# Patient Record
Sex: FEMALE | Race: WHITE | NOT HISPANIC OR LATINO | Employment: OTHER | URBAN - METROPOLITAN AREA
[De-identification: names, ages, dates, MRNs, and addresses within clinical notes are randomized per-mention and may not be internally consistent; named-entity substitution may affect disease eponyms.]

---

## 2017-03-15 ENCOUNTER — TELEPHONE (OUTPATIENT)
Dept: FAMILY MEDICINE | Facility: CLINIC | Age: 62
End: 2017-03-15

## 2017-03-15 NOTE — TELEPHONE ENCOUNTER
Patient states she has been taking Citalopram for anxiety. Patient has been irritable, lethargic, overwhelmed by family situations . Conditions have worsened lately and would like to discuss medication changes. Appt scheduled for 3/16/17, patient verbalized understanding.

## 2017-03-15 NOTE — TELEPHONE ENCOUNTER
----- Message from Sandra Sanderson sent at 3/15/2017 10:53 AM CDT -----  Patient requesting appointment this week with doctor to discuss medications/stated feels medication is not helping/please call back at [phone] to schedule or advise.

## 2017-03-16 ENCOUNTER — OFFICE VISIT (OUTPATIENT)
Dept: FAMILY MEDICINE | Facility: CLINIC | Age: 62
End: 2017-03-16
Payer: COMMERCIAL

## 2017-03-16 VITALS
SYSTOLIC BLOOD PRESSURE: 124 MMHG | TEMPERATURE: 99 F | DIASTOLIC BLOOD PRESSURE: 70 MMHG | BODY MASS INDEX: 26.13 KG/M2 | RESPIRATION RATE: 16 BRPM | HEART RATE: 71 BPM | WEIGHT: 147.5 LBS | HEIGHT: 63 IN

## 2017-03-16 DIAGNOSIS — F33.1 MAJOR DEPRESSIVE DISORDER, RECURRENT, MODERATE: Primary | ICD-10-CM

## 2017-03-16 PROCEDURE — 99214 OFFICE O/P EST MOD 30 MIN: CPT | Mod: S$GLB,,, | Performed by: INTERNAL MEDICINE

## 2017-03-16 PROCEDURE — 3078F DIAST BP <80 MM HG: CPT | Mod: S$GLB,,, | Performed by: INTERNAL MEDICINE

## 2017-03-16 PROCEDURE — 1160F RVW MEDS BY RX/DR IN RCRD: CPT | Mod: S$GLB,,, | Performed by: INTERNAL MEDICINE

## 2017-03-16 PROCEDURE — 3074F SYST BP LT 130 MM HG: CPT | Mod: S$GLB,,, | Performed by: INTERNAL MEDICINE

## 2017-03-16 RX ORDER — VENLAFAXINE HYDROCHLORIDE 75 MG/1
75 CAPSULE, EXTENDED RELEASE ORAL DAILY
Qty: 30 CAPSULE | Refills: 11 | Status: SHIPPED | OUTPATIENT
Start: 2017-03-16 | End: 2017-04-26

## 2017-03-16 NOTE — MR AVS SNAPSHOT
[facility] ([place], [place])  [address] [address] [address]  [facility] [address]  Phone: [phone]  Fax: [phone]                  Keena Banks   3/16/2017 11:00 AM   Office Visit    Description:  Female : 1955   Provider:  Marisela Lockwood DO   Department:  [facility] ([place], [place])           Reason for Visit     Follow-up           Diagnoses this Visit        Comments    Major depressive disorder, recurrent, moderate    -  Primary            To Do List           Future Appointments        Provider Department Dept Phone    2017 11:20 AM Marisela Lockwood DO [facility] ([place], [place]) [phone]    2017 10:00 AM Marisela Lockwood DO [facility] ([place], [place]) [phone]    2017 1:00 PM LAB, [place] [facility] [phone]    2017 1:20 PM Aryan Luciano NP [facility] - Hematology [phone]      Goals (5 Years of Data)              Today    16    Blood Pressure < 140/90   124/70  124/70  124/70       These Medications        Disp Refills Start End    venlafaxine (EFFEXOR-XR) 75 MG 24 hr capsule 30 capsule 11 3/16/2017 3/16/2018    Take 1 capsule (75 mg total) by mouth once daily. - Oral    Pharmacy: [facility]/pharmacy #[id number] - [place] - [address] Ph #: [phone]         [facility]     [facility] -  Assistance  Registered nurses in the [facility] Center provide clinical advisement, health education, appointment booking, and other advisory services.  Call for this free service at [phone].             Medications           Message regarding Medications     Verify the changes and/or additions to your medication regime listed below are the same as discussed with your clinician today.  If any of these changes or additions are incorrect, please notify your healthcare provider.        START taking these NEW medications        Refills    venlafaxine (EFFEXOR-XR) 75 MG 24 hr capsule  "11    Sig: Take 1 capsule (75 mg total) by mouth once daily.    Class: Normal    Route: Oral           Verify that the below list of medications is an accurate representation of the medications you are currently taking.  If none reported, the list may be blank. If incorrect, please contact your healthcare provider. Carry this list with you in case of emergency.           Current Medications     aspirin (ECOTRIN) 81 MG EC tablet Take 1 tablet (81 mg total) by mouth once daily.    citalopram (CELEXA) 40 MG tablet TAKE 1 TABLET (40 MG TOTAL) BY MOUTH ONCE DAILY.    ESTRACE 0.01 % (0.1 mg/gram) vaginal cream PLACE 2 GRAMS VAGINALLY EVERY 7 DAYS    flaxseed oil 1,030 mg Cap Take 1 capsule by mouth once daily. Every morning    folic acid (FOLVITE) 1 MG tablet Take 1 mg by mouth once daily.      hydrochlorothiazide (HYDRODIURIL) 25 MG tablet TAKE 1 TABLET BY MOUTH EVERY DAY    ibuprofen (ADVIL,MOTRIN) 200 MG tablet Take 400 mg by mouth every 6 (six) hours as needed for Pain.    lamotrigine (LAMICTAL) 300 mg XR tablet Take 1 tablet (300 mg total) by mouth once daily.    multivitamin with minerals tablet Take 1 tablet by mouth once daily.      rosuvastatin (CRESTOR) 20 MG tablet Take 1 tablet (20 mg total) by mouth once daily.    venlafaxine (EFFEXOR-XR) 75 MG 24 hr capsule Take 1 capsule (75 mg total) by mouth once daily.           Clinical Reference Information           Your Vitals Were     BP Pulse Temp Resp Height Weight    124/70 71 98.7 °F (37.1 °C) (Oral) 16 5' 3" (1.6 m) 66.9 kg (147 lb 7.8 oz)    BMI                26.13 kg/m2          Blood Pressure          Most Recent Value    BP  124/70      Allergies as of 3/16/2017     Adhesive    Sulfa (Sulfonamide Antibiotics)      Immunizations Administered on Date of Encounter - 3/16/2017     None      Language Assistance Services     ATTENTION: Language assistance services are available, free of charge. Please call [phone].      ATENCIÓN: ros Johansen " wallace disposición servicios gratuitos de asistencia lingüística. Llgale al [phone].     MELINA Ý: N?u b?n nói Ti?ng Vi?t, có các d?ch v? h? tr? ngôn ng? mi?n phí dành cho b?n. G?i s? [phone].         [facility] complies with applicable Federal civil rights laws and does not discriminate on the basis of race, color, national origin, age, disability, or sex.

## 2017-03-16 NOTE — PROGRESS NOTES
Subjective:       Patient ID: Keena Banks is a 61 y.o. female.    Medication List with Changes/Refills   New Medications    VENLAFAXINE (EFFEXOR-XR) 75 MG 24 HR CAPSULE    Take 1 capsule (75 mg total) by mouth once daily.   Current Medications    ASPIRIN (ECOTRIN) 81 MG EC TABLET    Take 1 tablet (81 mg total) by mouth once daily.    ESTRACE 0.01 % (0.1 MG/GRAM) VAGINAL CREAM    PLACE 2 GRAMS VAGINALLY EVERY 7 DAYS    FLAXSEED OIL 1,030 MG CAP    Take 1 capsule by mouth once daily. Every morning    FOLIC ACID (FOLVITE) 1 MG TABLET    Take 1 mg by mouth once daily.      HYDROCHLOROTHIAZIDE (HYDRODIURIL) 25 MG TABLET    TAKE 1 TABLET BY MOUTH EVERY DAY    IBUPROFEN (ADVIL,MOTRIN) 200 MG TABLET    Take 400 mg by mouth every 6 (six) hours as needed for Pain.    LAMOTRIGINE (LAMICTAL) 300 MG XR TABLET    Take 1 tablet (300 mg total) by mouth once daily.    MULTIVITAMIN WITH MINERALS TABLET    Take 1 tablet by mouth once daily.      ROSUVASTATIN (CRESTOR) 20 MG TABLET    Take 1 tablet (20 mg total) by mouth once daily.   Discontinued Medications    CITALOPRAM (CELEXA) 40 MG TABLET    TAKE 1 TABLET (40 MG TOTAL) BY MOUTH ONCE DAILY.       Chief Complaint: Follow-up (wants to change Citalopram)  She has depression and has been on celexa for over 10 years.  She reports over the last couple months worsening depression. She is overwhelmed and get easily upset.  A lot of this has to due with raising her granddaughter who has some issues.  She is withdrawing and does not like to do the things she use to enjoy.  No anxiety but some mood swings.  No suicidal ideations. She is sleeping well and over eating.  She has no energy and is more irritable. She would like to try another antidepressant.      Review of Systems   Constitutional: Positive for fatigue. Negative for activity change, appetite change, chills, fever and unexpected weight change.   HENT: Negative for congestion, ear discharge, ear pain, hearing loss, mouth sores,  "postnasal drip, rhinorrhea, sinus pressure, sore throat and trouble swallowing.    Eyes: Negative for pain, discharge, redness and visual disturbance.   Respiratory: Negative for cough, chest tightness, shortness of breath and wheezing.    Cardiovascular: Negative for chest pain, palpitations and leg swelling.   Gastrointestinal: Negative for abdominal pain, blood in stool, constipation, diarrhea, nausea and vomiting.   Endocrine: Negative for polyuria.   Genitourinary: Negative for dysuria and hematuria.   Musculoskeletal: Negative for arthralgias, back pain, myalgias and neck stiffness.   Skin: Negative for rash.   Neurological: Negative for dizziness, weakness, numbness and headaches.   Hematological: Negative for adenopathy. Does not bruise/bleed easily.   Psychiatric/Behavioral: Positive for dysphoric mood. Negative for sleep disturbance and suicidal ideas. The patient is not nervous/anxious.        Objective:      Vitals:    03/16/17 1100   BP: 124/70   Pulse: 71   Resp: 16   Temp: 98.7 °F (37.1 °C)   TempSrc: Oral   Weight: 66.9 kg (147 lb 7.8 oz)   Height: 5' 3" (1.6 m)     Body mass index is 26.13 kg/(m^2).  Physical Exam    General appearance: No acute distress, cooperative  Neck: FROM, soft, supple, no thyromegaly, no bruits  Lymph: no anterior or posterior cervical adenopathy  Heart::  Regular rate and rhythm, no murmur  Lung: Clear to ascultation bilaterally, no wheezing, no rales, no rhonchi, no distress  Abdomen: Soft, nontender, no distention, no hepatosplenomegaly, bowel sounds normal, no guarding, no rebound, no peritoneal signs  Skin: no rashes, no lesions  Extremities: no edema, no cyanosis  Peripheral pulses: 2+ pedal pulses bilaterally, good perfusion and colo    Assessment:       1. Major depressive disorder, recurrent, moderate        Plan:       Major depressive disorder, recurrent, moderate  Uncontrolled and will try to switch her to an SNRI.  She will take celexa 1/2 pill for one week then " stop. She will start venlafaxine once a day.  She will f/u with me in 3-4 weeks to recheck.  She will call is she is having any issues.    -     venlafaxine (EFFEXOR-XR) 75 MG 24 hr capsule; Take 1 capsule (75 mg total) by mouth once daily.  Dispense: 30 capsule; Refill: 11    Return in about 4 weeks (around 4/13/2017) for recheck depression.      Greater than 25 minutes was spent in direct face to face time with patient.  Greater than 50% of the time spent was in counseling and coordination of care for depression.

## 2017-04-05 ENCOUNTER — DOCUMENTATION ONLY (OUTPATIENT)
Dept: ADMINISTRATIVE | Facility: HOSPITAL | Age: 62
End: 2017-04-05

## 2017-04-05 NOTE — PROGRESS NOTES
PRE-VISIT CHART REVIEW    Appointment Scheduled on 4/19/17    Department stratifications & guidelines reviewed:yes    Target Chronic Diagnosis: HTN    Chronic Diagnosis Intervention Due: no    Goals Updated:N/A    There are no preventive care reminders to display for this patient.    Advanced Directives:   65 years of age or older?  No  Directive on file?  N\A                                      Pre-visit patient communication:  In Person    Studies or screenings scheduled pre-visit: no

## 2017-04-26 ENCOUNTER — OFFICE VISIT (OUTPATIENT)
Dept: FAMILY MEDICINE | Facility: CLINIC | Age: 62
End: 2017-04-26
Payer: COMMERCIAL

## 2017-04-26 VITALS
SYSTOLIC BLOOD PRESSURE: 128 MMHG | DIASTOLIC BLOOD PRESSURE: 80 MMHG | BODY MASS INDEX: 25.78 KG/M2 | TEMPERATURE: 98 F | WEIGHT: 145.5 LBS | HEIGHT: 63 IN | RESPIRATION RATE: 18 BRPM | HEART RATE: 70 BPM

## 2017-04-26 DIAGNOSIS — F33.0 MAJOR DEPRESSIVE DISORDER, RECURRENT, MILD: Primary | ICD-10-CM

## 2017-04-26 PROCEDURE — 1160F RVW MEDS BY RX/DR IN RCRD: CPT | Mod: S$GLB,,, | Performed by: INTERNAL MEDICINE

## 2017-04-26 PROCEDURE — 99213 OFFICE O/P EST LOW 20 MIN: CPT | Mod: S$GLB,,, | Performed by: INTERNAL MEDICINE

## 2017-04-26 PROCEDURE — 3074F SYST BP LT 130 MM HG: CPT | Mod: S$GLB,,, | Performed by: INTERNAL MEDICINE

## 2017-04-26 PROCEDURE — 3079F DIAST BP 80-89 MM HG: CPT | Mod: S$GLB,,, | Performed by: INTERNAL MEDICINE

## 2017-04-26 RX ORDER — VENLAFAXINE HYDROCHLORIDE 150 MG/1
150 CAPSULE, EXTENDED RELEASE ORAL DAILY
Qty: 90 CAPSULE | Refills: 2 | Status: SHIPPED | OUTPATIENT
Start: 2017-04-26 | End: 2018-01-12 | Stop reason: SDUPTHER

## 2017-04-26 NOTE — PROGRESS NOTES
Subjective:       Patient ID: Keena Banks is a 62 y.o. female.    Current Outpatient Prescriptions   Medication Sig Dispense Refill    aspirin (ECOTRIN) 81 MG EC tablet Take 1 tablet (81 mg total) by mouth once daily.  0    ESTRACE 0.01 % (0.1 mg/gram) vaginal cream PLACE 2 GRAMS VAGINALLY EVERY 7 DAYS 42.5 g 1    flaxseed oil 1,030 mg Cap Take 1 capsule by mouth once daily. Every morning      folic acid (FOLVITE) 1 MG tablet Take 1 mg by mouth once daily.        hydrochlorothiazide (HYDRODIURIL) 25 MG tablet TAKE 1 TABLET BY MOUTH EVERY DAY 90 tablet 3    ibuprofen (ADVIL,MOTRIN) 200 MG tablet Take 400 mg by mouth every 6 (six) hours as needed for Pain.      lamotrigine (LAMICTAL) 300 mg XR tablet Take 1 tablet (300 mg total) by mouth once daily. 90 tablet 3    multivitamin with minerals tablet Take 1 tablet by mouth once daily.        rosuvastatin (CRESTOR) 20 MG tablet Take 1 tablet (20 mg total) by mouth once daily. 90 tablet 3    venlafaxine (EFFEXOR-XR) 150 MG Cp24 Take 1 capsule (150 mg total) by mouth once daily. 90 capsule 2     No current facility-administered medications for this visit.      Chief Complaint: Follow-up (4 week follow up for medicaiton change )  she is here today to f/u on her depression. She was seen on 3/16/17 for depression that was uncontrolled. She had been taking celexa for years and was decided to change to venlafaxine. She says she made the transition well with only one week of lightheadedness and headaches.  This has resolved. She says this medication is working but she is still weepy and low energy. She feels she needs a higher dose.  No suicidal ideations. She is sleeping okay and eating okay.  No side effects.      Review of Systems   Constitutional: Negative for activity change, appetite change, chills, fatigue and fever.   HENT: Negative for congestion, ear discharge, ear pain, mouth sores, postnasal drip, rhinorrhea, sinus pressure and sore throat.    Eyes: Negative  "for pain, discharge and redness.   Respiratory: Negative for cough, chest tightness, shortness of breath and wheezing.    Gastrointestinal: Negative for abdominal pain, constipation, diarrhea, nausea and vomiting.   Genitourinary: Negative for dysuria.   Musculoskeletal: Negative for arthralgias and neck stiffness.   Skin: Negative for rash.   Neurological: Negative for headaches.   Hematological: Negative for adenopathy.   Psychiatric/Behavioral: Positive for dysphoric mood.       Objective:      Vitals:    04/26/17 0841 04/26/17 0851   BP: (!) 156/86 128/80   Pulse: 70    Resp: 18    Temp: 98.1 °F (36.7 °C)    Weight: 66 kg (145 lb 8.1 oz)    Height: 5' 3" (1.6 m)      Body mass index is 25.77 kg/(m^2).  Physical Exam    General appearance: alert, no acute distress  Neck: supple, FROM, no masses, no tenderness  Lymph: no posterior or cervical adenopathy  Lungs: no distress, no retractions, clear to ascultation bilaterally, no wheezing, no rales, no rhonchi  Heart:: Regular rate and rhythm, no murmur  Abdomen: soft, non-tender, no guarding, no rebound, no peritoneal signs, bowel sounds normal, no hepatosplenomegaly, no masses  Skin: no rashes or lesion  Perfusion: good capillary refill, normal pulses      Assessment:       1. Major depressive disorder, recurrent, mild        Plan:       Major depressive disorder, recurrent, mild  Improved on effexor but still with symptoms .  Will increase the dose today and she will f/u by email or call if having issues.    -     venlafaxine (EFFEXOR-XR) 150 MG Cp24; Take 1 capsule (150 mg total) by mouth once daily.  Dispense: 90 capsule; Refill: 2    Return for already scheduled.      "

## 2017-06-13 ENCOUNTER — OFFICE VISIT (OUTPATIENT)
Dept: FAMILY MEDICINE | Facility: CLINIC | Age: 62
End: 2017-06-13
Payer: COMMERCIAL

## 2017-06-13 VITALS
SYSTOLIC BLOOD PRESSURE: 124 MMHG | HEIGHT: 63 IN | TEMPERATURE: 99 F | HEART RATE: 73 BPM | WEIGHT: 147.69 LBS | DIASTOLIC BLOOD PRESSURE: 86 MMHG | RESPIRATION RATE: 18 BRPM | BODY MASS INDEX: 26.17 KG/M2 | OXYGEN SATURATION: 95 %

## 2017-06-13 DIAGNOSIS — Z85.72 HISTORY OF LYMPHOMA: ICD-10-CM

## 2017-06-13 DIAGNOSIS — I10 ESSENTIAL HYPERTENSION: ICD-10-CM

## 2017-06-13 DIAGNOSIS — G40.909 SEIZURE DISORDER: ICD-10-CM

## 2017-06-13 DIAGNOSIS — K76.0 NAFLD (NONALCOHOLIC FATTY LIVER DISEASE): ICD-10-CM

## 2017-06-13 DIAGNOSIS — E78.5 HYPERLIPIDEMIA, UNSPECIFIED HYPERLIPIDEMIA TYPE: ICD-10-CM

## 2017-06-13 DIAGNOSIS — F33.0 MAJOR DEPRESSIVE DISORDER, RECURRENT, MILD: Primary | ICD-10-CM

## 2017-06-13 PROCEDURE — 99214 OFFICE O/P EST MOD 30 MIN: CPT | Mod: S$GLB,,, | Performed by: INTERNAL MEDICINE

## 2017-06-13 NOTE — MEDICAL/APP STUDENT
Subjective:       Patient ID: Keena Banks is a 62 y.o. female.    Chief Complaint: Follow-up, Medication Management    Pt is a 63yo F with PMHx of depression, HTN, HTLD, seizure disorder here for follow up.    Pt reports she has been feeling irritable with her new dose of Effexor. She finds it hard to get up in the morning despite sleeping well. When she does get up, she feels fine. She has had a stressful week with the death of a relative so that might have contributed. She denies feeling sad, trouble sleeping, or decreased appetite.    Pt reports not checking her blood pressure lately since having a busy week. Denies Has, blurry vision, abdominal pain    Pt reports no seizures to date. Has no trouble with her medications and will be following up with her neurologist in August.      Review of Systems   Constitutional: Negative for chills, fatigue and fever.   HENT: Negative for ear pain, postnasal drip, rhinorrhea, sinus pressure, sneezing and sore throat.    Eyes: Negative for pain and visual disturbance.   Respiratory: Negative for cough, chest tightness and shortness of breath.    Cardiovascular: Negative for chest pain and leg swelling.   Gastrointestinal: Negative for abdominal pain, constipation, diarrhea, nausea and vomiting.   Genitourinary: Negative for dysuria, frequency, hematuria and urgency.   Musculoskeletal: Negative for arthralgias and back pain.   Neurological: Negative for dizziness, seizures, numbness and headaches.   Psychiatric/Behavioral: Positive for agitation.       Objective:      Physical Exam   Constitutional: She is oriented to person, place, and time. She appears well-developed and well-nourished.   HENT:   Head: Normocephalic and atraumatic.   Eyes: EOM are normal. Pupils are equal, round, and reactive to light.   Neck: Normal range of motion. Neck supple.   Cardiovascular: Normal rate, regular rhythm and normal heart sounds.    Pulmonary/Chest: Effort normal and breath sounds normal.    Abdominal: Soft. Bowel sounds are normal.   Neurological: She is alert and oriented to person, place, and time.       Assessment:       1. Major depressive disorder, recurrent, mild    2. Essential hypertension    3. Hyperlipidemia, unspecified hyperlipidemia type    4. NAFLD (nonalcoholic fatty liver disease)    5. History of lymphoma    6. Seizure disorder        Plan:       1. Major depressive disorder, recurrent, mild - will continue to try this dose of Effexor and look for changes in mood.  - continue current medication    2. Essential hypertension - well controlled  - continue current medication    3. Hyperlipidemia, unspecified hyperlipidemia type  - continue current medication    4. Seizure disorder - no new seizures to date, continue to follow up with neurology   - DXA Bone Density Spine And Hip; Future    Johnna Correia, MS4

## 2017-06-13 NOTE — PROGRESS NOTES
Subjective:       Patient ID: Keena Banks is a 62 y.o. female.    Current Outpatient Prescriptions   Medication Sig Dispense Refill    aspirin (ECOTRIN) 81 MG EC tablet Take 1 tablet (81 mg total) by mouth once daily.  0    ESTRACE 0.01 % (0.1 mg/gram) vaginal cream PLACE 2 GRAMS VAGINALLY EVERY 7 DAYS 42.5 g 1    flaxseed oil 1,030 mg Cap Take 1 capsule by mouth once daily. Every morning      folic acid (FOLVITE) 1 MG tablet Take 1 mg by mouth once daily.        hydrochlorothiazide (HYDRODIURIL) 25 MG tablet TAKE 1 TABLET BY MOUTH EVERY DAY 90 tablet 3    ibuprofen (ADVIL,MOTRIN) 200 MG tablet Take 400 mg by mouth every 6 (six) hours as needed for Pain.      lamotrigine (LAMICTAL) 300 mg XR tablet Take 1 tablet (300 mg total) by mouth once daily. 90 tablet 3    multivitamin with minerals tablet Take 1 tablet by mouth once daily.        rosuvastatin (CRESTOR) 20 MG tablet Take 1 tablet (20 mg total) by mouth once daily. 90 tablet 3    venlafaxine (EFFEXOR-XR) 150 MG Cp24 Take 1 capsule (150 mg total) by mouth once daily. 90 capsule 2     No current facility-administered medications for this visit.      Chief Complaint: Follow-up, Medication Management  She is here today to f/u on her chronic medical issues.      She has hypertension that is controlled with HCTZ 25 mg qday. She says her home BP reading are good. She has no known CAD. She denies chest pain or shortness of breath.      She has hyperlipidemia controlled on crestor 40 mg qday. Her last lipids were done on 12/2016 were 184/52/89/84.  She is taking aspirin daily.     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 She is taking lamictal. She is doing very well. She has not had a seizure for over 3 years. She does say the lamictal affects her memory but no other side effects. She follows with neurology annually and was last seen on 6/2016.   She is going to call for an appt.      She had  depression with anxiety that was recently uncontrolled.  Eight weeks ago she was transitioned to effexor and one month ago her dose was increased to 150 mg qday. She feels she is doing better. She says she still gets irritable but her depressive symptoms are improving.  No suicidal ideations. No sleep issues. No anxiety. She continues to raise her 10 year old granddaughter who has ADD and emotional issues.      She has a history of NHL s/p 8 cycles of chemotherapy with CHOP/rituxan. She was  seen by oncology for her 48 month checkup after chemo on 8/2016. She continues to be in remission and will f/u in one year.      She continues has now retired from [employer] and is very happy with this decision. . She does walk 3 times a week but does not exercise. She tries to eat healthy but has had 8 lbs weight gain.       She had an ultrasound showing fatty infiltration of her liver. She has normal LFTs.       Colonoscopy---8/2016 repeat in 8 years  Mammogram----12/2016 neg  DEXA-----never  Pap-----once since [facility] neg  Tdap---7/2008  Influenza vaccine---12/2016  Pneumovax 23----11/2015  Prevnar 13----12/2016  Shingles vaccine-----11/2015    Review of Systems   Constitutional: Negative for appetite change, fatigue, fever and unexpected weight change.   HENT: Negative for congestion, ear pain, hearing loss, sore throat and trouble swallowing.    Eyes: Negative for pain and visual disturbance.   Respiratory: Negative for cough, chest tightness, shortness of breath and wheezing.    Cardiovascular: Negative for chest pain, palpitations and leg swelling.   Gastrointestinal: Negative for abdominal pain, blood in stool, constipation, diarrhea, nausea and vomiting.   Endocrine: Negative for polyuria.   Genitourinary: Negative for dysuria and hematuria.   Musculoskeletal: Negative for arthralgias, back pain and myalgias.   Skin: Negative for rash.   Neurological: Negative for dizziness, weakness, numbness and headaches.   Hematological:  "Does not bruise/bleed easily.   Psychiatric/Behavioral: Negative for dysphoric mood, sleep disturbance and suicidal ideas. The patient is not nervous/anxious.        Objective:      Vitals:    06/13/17 0905   BP: 124/86   BP Location: Left arm   Patient Position: Sitting   BP Method: Manual   Pulse: 73   Resp: 18   Temp: 99 °F (37.2 °C)   TempSrc: Oral   SpO2: 95%   Weight: 67 kg (147 lb 11.3 oz)   Height: 5' 3" (1.6 m)     Body mass index is 26.17 kg/m².  Physical Exam    General appearance: No acute distress, cooperative  Neck: FROM, soft, supple, no thyromegaly, no bruits  Lymph: no anterior or posterior cervical adenopathy  Heart::  Regular rate and rhythm, no murmur  Lung: Clear to ascultation bilaterally, no wheezing, no rales, no rhonchi, no distress  Abdomen: Soft, nontender, no distention, no hepatosplenomegaly, bowel sounds normal, no guarding, no rebound, no peritoneal signs  Skin: no rashes, no lesions  Extremities: no edema, no cyanosis  Neuro: no focal abnormalities, strength 5/5 b/l UE and LE, 2+ DTRs b/l UE and LE, normal gait  Peripheral pulses: 2+ pedal pulses bilaterally, good perfusion and color  Musculoskeletal: FROM, good strenth, no tenderness  Joint: normal appearance, no swelling, no warmth, no deformity in all joints    Assessment:       1. Major depressive disorder, recurrent, mild    2. Essential hypertension    3. Hyperlipidemia, unspecified hyperlipidemia type    4. NAFLD (nonalcoholic fatty liver disease)    5. Seizure disorder    6. History of lymphoma        Plan:       Major depressive disorder, recurrent, mild  Improved on this dose of effexor.  No changes at this time and she will continue to monitor symptoms. No side effects.     Essential hypertension  Good control on this regimen.     Hyperlipidemia, unspecified hyperlipidemia type  Good control on crestor.     NAFLD (nonalcoholic fatty liver disease)  Stable with normal LFTs.     Seizure disorder  Stable without any seizures " in over 3 years. She will call to set up an appt for neurology (follows annually). No side effects from lamictal. Will get a bone density due to chronic anti-epileptic medication.   -     DXA Bone Density Spine And Hip; Future; Expected date: 06/13/2017    History of lymphoma  In remission and doing well.  She will f/u with oncology this month. Follows annually.      Return in about 6 months (around 12/13/2017) for chronic medical issues.

## 2017-06-21 ENCOUNTER — OFFICE VISIT (OUTPATIENT)
Dept: NEUROLOGY | Facility: CLINIC | Age: 62
End: 2017-06-21
Payer: COMMERCIAL

## 2017-06-21 VITALS
HEIGHT: 63 IN | DIASTOLIC BLOOD PRESSURE: 78 MMHG | WEIGHT: 147.69 LBS | SYSTOLIC BLOOD PRESSURE: 127 MMHG | HEART RATE: 77 BPM | BODY MASS INDEX: 26.17 KG/M2 | RESPIRATION RATE: 20 BRPM

## 2017-06-21 DIAGNOSIS — R56.9 SEIZURE: Primary | ICD-10-CM

## 2017-06-21 PROCEDURE — 99213 OFFICE O/P EST LOW 20 MIN: CPT | Mod: S$GLB,,, | Performed by: PSYCHIATRY & NEUROLOGY

## 2017-06-21 PROCEDURE — 99999 PR PBB SHADOW E&M-EST. PATIENT-LVL III: CPT | Mod: PBBFAC,,, | Performed by: PSYCHIATRY & NEUROLOGY

## 2017-06-21 RX ORDER — LAMOTRIGINE 300 MG/1
300 TABLET, EXTENDED RELEASE ORAL DAILY
Qty: 90 TABLET | Refills: 3 | Status: SHIPPED | OUTPATIENT
Start: 2017-06-21 | End: 2018-05-10 | Stop reason: SDUPTHER
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
## 2017-07-21 DIAGNOSIS — E78.5 HYPERLIPIDEMIA, UNSPECIFIED HYPERLIPIDEMIA TYPE: ICD-10-CM

## 2017-07-21 RX ORDER — ROSUVASTATIN CALCIUM 20 MG/1
TABLET, COATED ORAL
Qty: 90 TABLET | Refills: 3 | Status: SHIPPED | OUTPATIENT
Start: 2017-07-21 | End: 2018-07-11 | Stop reason: SDUPTHER

## 2017-08-21 ENCOUNTER — HOSPITAL ENCOUNTER (OUTPATIENT)
Dept: RADIOLOGY | Facility: HOSPITAL | Age: 62
Discharge: HOME | End: 2017-08-21
Attending: INTERNAL MEDICINE
Payer: COMMERCIAL

## 2017-08-21 ENCOUNTER — PATIENT MESSAGE (OUTPATIENT)
Dept: FAMILY MEDICINE | Facility: CLINIC | Age: 62
End: 2017-08-21

## 2017-08-21 DIAGNOSIS — G40.909 SEIZURE DISORDER: ICD-10-CM

## 2017-08-21 PROCEDURE — 77080 DXA BONE DENSITY AXIAL: CPT | Mod: 26,,, | Performed by: RADIOLOGY

## 2017-08-21 PROCEDURE — 77080 DXA BONE DENSITY AXIAL: CPT | Mod: TC,PO

## 2017-08-28 ENCOUNTER — OFFICE VISIT (OUTPATIENT)
Dept: HEMATOLOGY/ONCOLOGY | Facility: CLINIC | Age: 62
End: 2017-08-28
Payer: COMMERCIAL

## 2017-08-28 VITALS
HEIGHT: 63 IN | TEMPERATURE: 99 F | WEIGHT: 147.06 LBS | BODY MASS INDEX: 26.06 KG/M2 | RESPIRATION RATE: 18 BRPM | DIASTOLIC BLOOD PRESSURE: 72 MMHG | HEART RATE: 72 BPM | SYSTOLIC BLOOD PRESSURE: 135 MMHG

## 2017-08-28 DIAGNOSIS — C83.30 DIFFUSE LARGE B-CELL LYMPHOMA, UNSPECIFIED BODY REGION: Primary | ICD-10-CM

## 2017-08-28 PROCEDURE — 3075F SYST BP GE 130 - 139MM HG: CPT | Mod: S$GLB,,, | Performed by: NURSE PRACTITIONER

## 2017-08-28 PROCEDURE — 3078F DIAST BP <80 MM HG: CPT | Mod: S$GLB,,, | Performed by: NURSE PRACTITIONER

## 2017-08-28 PROCEDURE — 3008F BODY MASS INDEX DOCD: CPT | Mod: S$GLB,,, | Performed by: NURSE PRACTITIONER

## 2017-08-28 PROCEDURE — 99213 OFFICE O/P EST LOW 20 MIN: CPT | Mod: S$GLB,,, | Performed by: NURSE PRACTITIONER

## 2017-08-28 PROCEDURE — 99999 PR PBB SHADOW E&M-EST. PATIENT-LVL III: CPT | Mod: PBBFAC,,, | Performed by: NURSE PRACTITIONER

## 2017-08-28 NOTE — PROGRESS NOTES
HISTORY OF PRESENT ILLNESS:  This is a 62-year-old white female known to Dr. Campos for stage IIIA diffuse large B-cell non-Hodgkin lymphoma that presented   with adenopathy in the left supraclavicular region.  She was treated with eight   cycles of CHOP/Rituxan and went into complete remission.  She presents to the   clinic today for her 60-month post-therapy evaluation in good spirits.  She   remains active and well.  She denies any recurrent illness, fevers,   chills, drenching night sweats, painful lymphadenopathy, unexplained weight   loss, rashes, pruritus, abdominal discomfort/bloating, nausea, vomiting,   constipation, diarrhea, irregular heartbeat, chest pain, bleeding, etc. No other   new complaints or pertinent findings on 14-point review of systems.    PHYSICAL EXAMINATION:  GENERAL:  Well-developed, well-nourished white female in no acute distress.    Alert and oriented x3.  VITAL SIGNS:  Weight 147 pounds (gain of three pounds in one year).  BP   135/72, pulse 72, respirations 18, temp 98.5.  HEENT:  Normocephalic, atraumatic.  Oral mucosa pink and moist.  Lips without   lesions.  Tongue midline.  Oropharynx clear.  Nonicteric sclerae.  NECK:  Supple, no adenopathy.  No carotid bruits, thyromegaly or thyroid nodule.  HEART:  Regular rate and rhythm without murmur, gallop or rub.  LUNGS:  Clear to auscultation bilaterally.  Normal respiratory effort.  ABDOMEN:  Soft, nontender, nondistended with positive normoactive bowel sounds,   no hepatosplenomegaly.  EXTREMITIES:  No cyanosis, clubbing or edema.  Distal pulses are intact.  AXILLAE AND GROIN:  No palpable pathologic lymphadenopathy is appreciated.  SKIN:  Intact/turgor normal.  NEUROLOGIC:  Cranial nerves II-XII grossly intact.  Motor:  Good muscle bulk and   tone.  Strength/sensory 5/5 throughout.  Gait stable.    LABORATORY:    Lab Results   Component Value Date    WBC 6.25 08/21/2017    HGB 12.5 08/21/2017    HCT 37.0 08/21/2017    MCV 88  08/21/2017     08/21/2017     Unremarkable differential    CMP  Sodium   Date Value Ref Range Status   08/21/2017 142 136 - 145 mmol/L Final     Potassium   Date Value Ref Range Status   08/21/2017 3.6 3.5 - 5.1 mmol/L Final     Chloride   Date Value Ref Range Status   08/21/2017 100 95 - 110 mmol/L Final     CO2   Date Value Ref Range Status   08/21/2017 29 23 - 29 mmol/L Final     Glucose   Date Value Ref Range Status   08/21/2017 96 70 - 110 mg/dL Final     BUN, Bld   Date Value Ref Range Status   08/21/2017 19 8 - 23 mg/dL Final     Creatinine   Date Value Ref Range Status   08/21/2017 0.9 0.5 - 1.4 mg/dL Final     Calcium   Date Value Ref Range Status   08/21/2017 9.7 8.7 - 10.5 mg/dL Final     Total Protein   Date Value Ref Range Status   08/21/2017 7.0 6.0 - 8.4 g/dL Final     Albumin   Date Value Ref Range Status   08/21/2017 4.1 3.5 - 5.2 g/dL Final     Total Bilirubin   Date Value Ref Range Status   08/21/2017 0.5 0.1 - 1.0 mg/dL Final     Comment:     For infants and newborns, interpretation of results should be based  on gestational age, weight and in agreement with clinical  observations.  Premature Infant recommended reference ranges:  Up to 24 hours.............<8.0 mg/dL  Up to 48 hours............<12.0 mg/dL  3-5 days..................<15.0 mg/dL  6-29 days.................<15.0 mg/dL       Alkaline Phosphatase   Date Value Ref Range Status   08/21/2017 106 55 - 135 U/L Final     AST   Date Value Ref Range Status   08/21/2017 28 10 - 40 U/L Final     ALT   Date Value Ref Range Status   08/21/2017 25 10 - 44 U/L Final     Anion Gap   Date Value Ref Range Status   08/21/2017 13 8 - 16 mmol/L Final     eGFR if    Date Value Ref Range Status   08/21/2017 >60 >60 mL/min/1.73 m^2 Final     eGFR if non    Date Value Ref Range Status   08/21/2017 >60 >60 mL/min/1.73 m^2 Final     Comment:     Calculation used to obtain the estimated glomerular filtration  rate (eGFR)  is the CKD-EPI equation. Since race is unknown   in our information system, the eGFR values for   -American and Non--American patients are given   for each creatinine result.       , [id number] 1.6    IMPRESSION:  1.  Stage IIIA diffuse large B-cell non-Hodgkin lymphoma - MUNIR.  2.  Idiopathic seizure disorder - stable.  3.  Elevated liver function test - fatty liver, remains stable.    PLAN: Return in one year with interval CBC, CMP, LDH, and beta-2 microglobulin   for annual surveillance.    Assessment/plan reviewed and approved by Dr. Campos.

## 2017-11-07 RX ORDER — HYDROCHLOROTHIAZIDE 25 MG/1
TABLET ORAL
Qty: 90 TABLET | Refills: 3 | Status: SHIPPED | OUTPATIENT
Start: 2017-11-07 | End: 2018-10-30 | Stop reason: SDUPTHER

## 2017-12-13 ENCOUNTER — OFFICE VISIT (OUTPATIENT)
Dept: FAMILY MEDICINE | Facility: CLINIC | Age: 62
End: 2017-12-13
Payer: COMMERCIAL

## 2017-12-13 VITALS
OXYGEN SATURATION: 97 % | TEMPERATURE: 99 F | WEIGHT: 144.63 LBS | RESPIRATION RATE: 17 BRPM | DIASTOLIC BLOOD PRESSURE: 66 MMHG | HEART RATE: 72 BPM | HEIGHT: 63 IN | SYSTOLIC BLOOD PRESSURE: 112 MMHG | BODY MASS INDEX: 25.62 KG/M2

## 2017-12-13 DIAGNOSIS — F33.0 MAJOR DEPRESSIVE DISORDER, RECURRENT EPISODE, MILD: ICD-10-CM

## 2017-12-13 DIAGNOSIS — E78.5 HYPERLIPIDEMIA, UNSPECIFIED HYPERLIPIDEMIA TYPE: ICD-10-CM

## 2017-12-13 DIAGNOSIS — B96.89 BACTERIAL VAGINOSIS: ICD-10-CM

## 2017-12-13 DIAGNOSIS — K76.0 NAFLD (NONALCOHOLIC FATTY LIVER DISEASE): ICD-10-CM

## 2017-12-13 DIAGNOSIS — J31.0 CHRONIC RHINITIS, UNSPECIFIED TYPE: Primary | ICD-10-CM

## 2017-12-13 DIAGNOSIS — F41.1 GAD (GENERALIZED ANXIETY DISORDER): ICD-10-CM

## 2017-12-13 DIAGNOSIS — G40.909 SEIZURE DISORDER: ICD-10-CM

## 2017-12-13 DIAGNOSIS — I10 ESSENTIAL HYPERTENSION: ICD-10-CM

## 2017-12-13 DIAGNOSIS — Z12.31 ENCOUNTER FOR SCREENING MAMMOGRAM FOR MALIGNANT NEOPLASM OF BREAST: ICD-10-CM

## 2017-12-13 DIAGNOSIS — N76.0 BACTERIAL VAGINOSIS: ICD-10-CM

## 2017-12-13 DIAGNOSIS — Z85.72 HISTORY OF LYMPHOMA: ICD-10-CM

## 2017-12-13 PROCEDURE — 99214 OFFICE O/P EST MOD 30 MIN: CPT | Mod: S$GLB,,, | Performed by: INTERNAL MEDICINE

## 2017-12-13 RX ORDER — METRONIDAZOLE 500 MG/1
500 TABLET ORAL EVERY 12 HOURS
Qty: 14 TABLET | Refills: 0 | Status: SHIPPED | OUTPATIENT
Start: 2017-12-13 | End: 2018-01-11

## 2017-12-13 RX ORDER — FLUTICASONE PROPIONATE 50 MCG
2 SPRAY, SUSPENSION (ML) NASAL DAILY
Qty: 1 BOTTLE | Refills: 6 | Status: SHIPPED | OUTPATIENT
Start: 2017-12-13 | End: 2023-05-29

## 2017-12-13 NOTE — PROGRESS NOTES
Subjective:       Patient ID: Keena Banks is a 62 y.o. female.    Medication List with Changes/Refills   New Medications    FLUTICASONE (FLONASE) 50 MCG/ACTUATION NASAL SPRAY    2 sprays by Each Nare route once daily.    METRONIDAZOLE (FLAGYL) 500 MG TABLET    Take 1 tablet (500 mg total) by mouth every 12 (twelve) hours.   Current Medications    ESTRACE 0.01 % (0.1 MG/GRAM) VAGINAL CREAM    PLACE 2 GRAMS VAGINALLY EVERY 7 DAYS    FLAXSEED OIL 1,030 MG CAP    Take 1 capsule by mouth once daily. Every morning    FOLIC ACID (FOLVITE) 1 MG TABLET    Take 1 mg by mouth once daily.      HYDROCHLOROTHIAZIDE (HYDRODIURIL) 25 MG TABLET    TAKE 1 TABLET BY MOUTH EVERY DAY    IBUPROFEN (ADVIL,MOTRIN) 200 MG TABLET    Take 400 mg by mouth every 6 (six) hours as needed for Pain.    LAMOTRIGINE XR (LAMICTAL XR) 300 MG XR TABLET    Take 1 tablet (300 mg total) by mouth once daily.    MULTIVITAMIN WITH MINERALS TABLET    Take 1 tablet by mouth once daily.      ROSUVASTATIN (CRESTOR) 20 MG TABLET    TAKE 1 TABLET BY MOUTH ONCE DAILY    VENLAFAXINE (EFFEXOR-XR) 150 MG CP24    Take 1 capsule (150 mg total) by mouth once daily.       Chief Complaint: Follow-up; cough, productive, yellow phlegm; and Skin cancer, removal procedure 12/14/17  She is here today to f/u on her chronic medical issues.      She has hypertension that is controlled with HCTZ 25 mg qday. She says her home BP reading are good. She has no known CAD. She denies chest pain or shortness of breath.      She has hyperlipidemia controlled on crestor 20 mg qday. Her last lipids were done on 12/2016 were 184/52/89/84.  She is taking aspirin daily.      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 She is taking lamictal  mg qday. She is doing very well. She has not had a seizure for over 4 years. She does say the lamictal affects her memory but no other side effects. She follows with neurology annually and  was last seen on 6/2017 and no changes were made.       She had depression with anxiety that is controlled on venlafaxine 150 mg qday.  She feels she is doing well on this dose of medication.  Her anxiety is improved since the issues with her granddaughter have settled.  She says she still gets irritable at time.  No suicidal ideations. No sleep issues.       She has a history of NHL(stage III A diffuse large B cell lymphoma) s/p 8 cycles of chemotherapy with CHOP/rituxan. She was  seen by oncology annual and her last appt was on 8/2017.  She continues to be in remission and will f/u in one year.      She continues has now retired from [employer] and is very happy with this decision. . She does walk 3 times a week but does not exercise. She tries to eat healthy. She continues to raise her 11 year old granddaughter who has ADD and emotional issues.     She had an ultrasound showing fatty infiltration of her liver. She has normal LFTs.      She complains of a persistent cough. She had a cold about 2 weeks ago that has improved but she has a lingering cough. No shortness of breath or wheezing. No fevers. No ear pain or sore throat. She does not feel she has a PND.      She complains of a couple week of a whitish vaginal discharge. No odor or itching. No pain. No pain with sex. No contact with STDs.  Described as milky white and profuse. No bleeding.       Colonoscopy---8/2016 repeat in 8 years  Mammogram----12/2016 neg  DEXA-----8/2017 normal  Pap-----once since [facility] neg  Tdap---7/2008  Influenza vaccine---8/2017  Pneumovax 23----11/2015  Prevnar 13----12/2016  Shingles vaccine-----11/2015    Review of Systems   Constitutional: Negative for appetite change, fatigue, fever and unexpected weight change.   HENT: Positive for postnasal drip. Negative for congestion, ear pain, hearing loss, sore throat and trouble swallowing.    Eyes: Negative for pain and visual disturbance.   Respiratory: Positive for cough. Negative for chest  "tightness, shortness of breath and wheezing.    Cardiovascular: Negative for chest pain, palpitations and leg swelling.   Gastrointestinal: Negative for abdominal pain, blood in stool, constipation, diarrhea, nausea and vomiting.   Endocrine: Negative for polyuria.   Genitourinary: Negative for dysuria and hematuria.   Musculoskeletal: Negative for arthralgias, back pain and myalgias.   Skin: Negative for rash.   Neurological: Negative for dizziness, weakness, numbness and headaches.   Hematological: Does not bruise/bleed easily.   Psychiatric/Behavioral: Positive for dysphoric mood. Negative for sleep disturbance and suicidal ideas. The patient is nervous/anxious.        Objective:      Vitals:    12/13/17 0927   BP: 112/66   BP Location: Left arm   Patient Position: Sitting   BP Method: Large (Manual)   Pulse: 72   Resp: 17   Temp: 98.5 °F (36.9 °C)   TempSrc: Oral   SpO2: 97%   Weight: 65.6 kg (144 lb 10 oz)   Height: 5' 3" (1.6 m)     Body mass index is 25.62 kg/m².  Physical Exam    General appearance: No acute distress, cooperative  Eyes: PERRL, EOMI, conjunctiva clear  Ears: normal external ear and pinna, tm clear without drainage, canals clear  Nose: boggy erythematous mucosa without drainage  Throat: no exudates or erythema, tonsils not enlarged  Mouth: no sores or lesions, moist mucous membranes, good dentition  Neck: FROM, soft, supple, no thyromegaly, no bruits  Lymph: no anterior or posterior cervical adenopathy  Heart::  Regular rate and rhythm, no murmur  Lung: Clear to ascultation bilaterally, no wheezing, no rales, no rhonchi, no distress  Abdomen: Soft, nontender, no distention, no hepatosplenomegaly, bowel sounds normal, no guarding, no rebound, no peritoneal signs  Skin: no rashes, no lesions  Extremities: no edema, no cyanosis  Neuro: CN 2-12 intact, 5/5 muscle strength upper and lower extremity bilaterally, 2+ DTRs UE and LE bilaterally, normal gait, normal sensation  Peripheral pulses: 2+ " pedal pulses bilaterally, good perfusion and color  Musculoskeletal: FROM, good strenth, no tenderness  Joint: normal appearance, no swelling, no warmth, no deformity in all joints    Assessment:       1. Chronic rhinitis, unspecified type    2. Essential hypertension    3. Hyperlipidemia, unspecified hyperlipidemia type    4. NAFLD (nonalcoholic fatty liver disease)    5. Major depressive disorder, recurrent episode, mild    6. SORAYA (generalized anxiety disorder)    7. Seizure disorder    8. Bacterial vaginosis    9. History of lymphoma    10. Encounter for screening mammogram for malignant neoplasm of breast        Plan:       Chronic rhinitis, unspecified type  Continue PND after recent URI. Will treat with nasal steroids.  She will f/u if symptoms worsen.    -     fluticasone (FLONASE) 50 mcg/actuation nasal spray; 2 sprays by Each Nare route once daily.  Dispense: 1 Bottle; Refill: 6    Essential hypertension  Good control on this regimen.   -     CBC auto differential; Future; Expected date: 12/13/2017  -     Comprehensive metabolic panel; Future; Expected date: 12/13/2017  -     TSH; Future; Expected date: 12/13/2017  -     Lipid panel; Future; Expected date: 12/13/2017    Hyperlipidemia, unspecified hyperlipidemia type  Good control and she is due to recheck lipids.     NAFLD (nonalcoholic fatty liver disease)  STable and has normal LFTs.     Major depressive disorder, recurrent episode, mild  Controlled on current regimen.     SORAYA (generalized anxiety disorder)  Stable and she is doing well.      Seizure disorder  Stable on lamictal without any further seizures. She follows with neurology annually.     Bacterial vaginosis  Vaginal discharge for 2 weeks.  She is not sexually active.  Suspect BV and will treat with flagyl. If no improvement then she will need a pelvic exam.   -     metroNIDAZOLE (FLAGYL) 500 MG tablet; Take 1 tablet (500 mg total) by mouth every 12 (twelve) hours.  Dispense: 14 tablet; Refill:  0    History of lymphoma  No active disease. She continues to follow with oncology annually.     Encounter for screening mammogram for malignant neoplasm of breast  -     Mammo Digital Screening Bilat with CAD; Future; Expected date: 12/13/2017    Return in about 6 months (around 6/13/2018) for chronic medical issues.

## 2018-01-04 ENCOUNTER — HOSPITAL ENCOUNTER (OUTPATIENT)
Dept: RADIOLOGY | Facility: HOSPITAL | Age: 63
Discharge: HOME | End: 2018-01-04
Attending: INTERNAL MEDICINE
Payer: COMMERCIAL

## 2018-01-04 ENCOUNTER — PATIENT MESSAGE (OUTPATIENT)
Dept: FAMILY MEDICINE | Facility: CLINIC | Age: 63
End: 2018-01-04

## 2018-01-04 DIAGNOSIS — Z12.31 ENCOUNTER FOR SCREENING MAMMOGRAM FOR MALIGNANT NEOPLASM OF BREAST: ICD-10-CM

## 2018-01-04 PROCEDURE — 77067 SCR MAMMO BI INCL CAD: CPT | Mod: 26,,, | Performed by: RADIOLOGY

## 2018-01-04 PROCEDURE — 77067 SCR MAMMO BI INCL CAD: CPT | Mod: TC,PO

## 2018-01-04 PROCEDURE — 77063 BREAST TOMOSYNTHESIS BI: CPT | Mod: 26,,, | Performed by: RADIOLOGY

## 2018-01-11 ENCOUNTER — TELEPHONE (OUTPATIENT)
Dept: FAMILY MEDICINE | Facility: CLINIC | Age: 63
End: 2018-01-11

## 2018-01-11 ENCOUNTER — OFFICE VISIT (OUTPATIENT)
Dept: FAMILY MEDICINE | Facility: CLINIC | Age: 63
End: 2018-01-11
Payer: COMMERCIAL

## 2018-01-11 VITALS
HEART RATE: 77 BPM | WEIGHT: 145.06 LBS | TEMPERATURE: 98 F | RESPIRATION RATE: 18 BRPM | OXYGEN SATURATION: 97 % | SYSTOLIC BLOOD PRESSURE: 132 MMHG | HEIGHT: 63 IN | BODY MASS INDEX: 25.7 KG/M2 | DIASTOLIC BLOOD PRESSURE: 86 MMHG

## 2018-01-11 DIAGNOSIS — N30.00 ACUTE CYSTITIS WITHOUT HEMATURIA: Primary | ICD-10-CM

## 2018-01-11 DIAGNOSIS — M54.41 ACUTE RIGHT-SIDED LOW BACK PAIN WITH RIGHT-SIDED SCIATICA: ICD-10-CM

## 2018-01-11 DIAGNOSIS — N89.8 VAGINAL DISCHARGE: ICD-10-CM

## 2018-01-11 PROCEDURE — 99214 OFFICE O/P EST MOD 30 MIN: CPT | Mod: S$GLB,,, | Performed by: INTERNAL MEDICINE

## 2018-01-11 PROCEDURE — 87491 CHLMYD TRACH DNA AMP PROBE: CPT

## 2018-01-11 PROCEDURE — 87186 SC STD MICRODIL/AGAR DIL: CPT

## 2018-01-11 PROCEDURE — 87077 CULTURE AEROBIC IDENTIFY: CPT

## 2018-01-11 PROCEDURE — 87086 URINE CULTURE/COLONY COUNT: CPT

## 2018-01-11 PROCEDURE — 87088 URINE BACTERIA CULTURE: CPT

## 2018-01-11 RX ORDER — CIPROFLOXACIN 500 MG/1
500 TABLET ORAL EVERY 12 HOURS
Qty: 10 TABLET | Refills: 0 | Status: SHIPPED | OUTPATIENT
Start: 2018-01-11 | End: 2018-03-09

## 2018-01-11 RX ORDER — TIZANIDINE 4 MG/1
4 TABLET ORAL NIGHTLY PRN
Qty: 20 TABLET | Refills: 0 | Status: SHIPPED | OUTPATIENT
Start: 2018-01-11 | End: 2018-01-11

## 2018-01-11 RX ORDER — AMOXICILLIN AND CLAVULANATE POTASSIUM 875; 125 MG/1; MG/1
1 TABLET, FILM COATED ORAL EVERY 12 HOURS
Qty: 10 TABLET | Refills: 0 | Status: SHIPPED | OUTPATIENT
Start: 2018-01-11 | End: 2018-01-11

## 2018-01-11 RX ORDER — BACLOFEN 10 MG/1
10 TABLET ORAL NIGHTLY PRN
Qty: 30 TABLET | Refills: 0 | Status: SHIPPED | OUTPATIENT
Start: 2018-01-11 | End: 2018-05-07 | Stop reason: SDUPTHER

## 2018-01-11 RX ORDER — CIPROFLOXACIN 500 MG/1
500 TABLET ORAL EVERY 12 HOURS
Qty: 10 TABLET | Refills: 0 | Status: SHIPPED | OUTPATIENT
Start: 2018-01-11 | End: 2018-01-11

## 2018-01-11 NOTE — TELEPHONE ENCOUNTER
Per pharmacy, interaction between medications (Cipro increases tizanidine effects 10x), requesting clarification to proceed with prescriptions or change abx.

## 2018-01-11 NOTE — PROGRESS NOTES
Subjective:       Patient ID: Keena Banks is a 62 y.o. female.    @  Medication List with Changes/Refills   New Medications    BACLOFEN (LIORESAL) 10 MG TABLET    Take 1 tablet (10 mg total) by mouth nightly as needed.    CIPROFLOXACIN HCL (CIPRO) 500 MG TABLET    Take 1 tablet (500 mg total) by mouth every 12 (twelve) hours.   Current Medications    ESTRACE 0.01 % (0.1 MG/GRAM) VAGINAL CREAM    PLACE 2 GRAMS VAGINALLY EVERY 7 DAYS    FLAXSEED OIL 1,030 MG CAP    Take 1 capsule by mouth once daily. Every morning    FLUTICASONE (FLONASE) 50 MCG/ACTUATION NASAL SPRAY    2 sprays by Each Nare route once daily.    FOLIC ACID (FOLVITE) 1 MG TABLET    Take 1 mg by mouth once daily.      HYDROCHLOROTHIAZIDE (HYDRODIURIL) 25 MG TABLET    TAKE 1 TABLET BY MOUTH EVERY DAY    IBUPROFEN (ADVIL,MOTRIN) 200 MG TABLET    Take 400 mg by mouth every 6 (six) hours as needed for Pain.    LAMOTRIGINE XR (LAMICTAL XR) 300 MG XR TABLET    Take 1 tablet (300 mg total) by mouth once daily.    MULTIVITAMIN WITH MINERALS TABLET    Take 1 tablet by mouth once daily.      ROSUVASTATIN (CRESTOR) 20 MG TABLET    TAKE 1 TABLET BY MOUTH ONCE DAILY    VENLAFAXINE (EFFEXOR-XR) 150 MG CP24    Take 1 capsule (150 mg total) by mouth once daily.       Chief Complaint: Possible UTI and Vaginosis  She presents with 6 days of pain with urination. She is going frequently and only a small amount. She is having bladder spasms at the end of voiding. No fevers. No vomiting or nausea. She has been taking azo for last 4 days.  Symptoms are not improving. She has no history of recurrent UTIs.     She still complains of whitish vaginal discharge despite taking flagyl for 7 days. She denies any odor or blood. No itching.  No pain or pelvic pain.  No pain with sex.      She pulled a muscle in the right side of her low back about 1 weeks ago with lifting.  She has pain that at times radiates down the right side of her leg. Worse with lifting and better when she  "stretches her leg out.  No weakness.  She is using ibuprofen and heat for the pain.      Review of Systems   Constitutional: Negative for activity change, appetite change, chills, fatigue and fever.   HENT: Negative for congestion, ear discharge, ear pain, mouth sores, postnasal drip, rhinorrhea, sinus pressure and sore throat.    Eyes: Negative for pain, discharge and redness.   Respiratory: Negative for cough, chest tightness, shortness of breath and wheezing.    Gastrointestinal: Negative for abdominal pain, constipation, diarrhea, nausea and vomiting.   Genitourinary: Positive for dysuria, frequency and urgency.   Musculoskeletal: Positive for arthralgias and back pain. Negative for neck stiffness.   Skin: Negative for rash.   Neurological: Negative for headaches.   Hematological: Negative for adenopathy.       Objective:      Vitals:    01/11/18 0833   BP: 132/86   BP Location: Left arm   Patient Position: Sitting   BP Method: Large (Manual)   Pulse: 77   Resp: 18   Temp: 98.4 °F (36.9 °C)   TempSrc: Oral   SpO2: 97%   Weight: 65.8 kg (145 lb 1 oz)   Height: 5' 3" (1.6 m)     Body mass index is 25.7 kg/m².  Physical Exam    General appearance: No acute distress, cooperative  Neck: FROM, soft, supple, no thyromegaly  Lymph: no anterior or posterior cervical adenopathy  Heart::  Regular rate and rhythm, no murmur  Lung: Clear to ascultation bilaterally, no wheezing, no rales, no rhonchi, no distress  Abdomen: Soft, nontender, no distention, no hepatosplenomegaly, bowel sounds normal, no guarding, no rebound, no peritoneal signs  Skin: no rashes, no lesions  Extremities: no edema, no cyanosis  Neuro: no focal abnormalities, strength 5/5 b/l UE and LE, 2+ DTRs b/l UE and LE, normal gait  Peripheral pulses: 2+ pedal pulses bilaterally, good perfusion and color  Musculoskeletal: FROM, good strenth, mild tenderness on palpation of right lower back  Joint: normal appearance, no swelling, no warmth, no deformity in all " joints    Assessment:       1. Acute cystitis without hematuria    2. Vaginal discharge    3. Acute right-sided low back pain with right-sided sciatica        Plan:       Acute cystitis without hematuria  Urinalysis consistent with UTI and will treat with abx.  Will send urine for culture.   -     C. trachomatis/N. gonorrhoeae by AMP DNA Urine  -     Urine culture  -     ciprofloxacin HCl (CIPRO) 500 MG tablet; Take 1 tablet (500 mg total) by mouth every 12 (twelve) hours.  Dispense: 10 tablet; Refill: 0    Vaginal discharge  ? Etiology. Will check for STD. IF symptoms persistent then she will need a pelvic exam.     Acute right-sided low back pain with right-sided sciatica  REassurance and supportive care.  Advised to use NSAIDs and will send a muscle relaxer to pharm to help with symptoms. IF this does not improve then I have asked her to call and will refer to PT.     Follow-up for to f/u if vaginal discharge continues for a pelvic exam.

## 2018-01-11 NOTE — TELEPHONE ENCOUNTER
Okay.     Continue cipro and stop tizanidine.  I changed to baclofen as a muscle relaxer.     Thanks

## 2018-01-11 NOTE — TELEPHONE ENCOUNTER
----- Message from Billie Freitas sent at 1/11/2018  9:10 AM CST -----  Clarification on Rx Cipro and Rx Tizanidine.  Please call [employer] at [phone]

## 2018-01-12 DIAGNOSIS — F33.0 MAJOR DEPRESSIVE DISORDER, RECURRENT, MILD: ICD-10-CM

## 2018-01-12 RX ORDER — VENLAFAXINE HYDROCHLORIDE 150 MG/1
150 CAPSULE, EXTENDED RELEASE ORAL DAILY
Qty: 90 CAPSULE | Refills: 2 | Status: SHIPPED | OUTPATIENT
Start: 2018-01-12 | End: 2018-10-09 | Stop reason: SDUPTHER

## 2018-01-15 LAB
BACTERIA UR CULT: NORMAL
C TRACH DNA SPEC QL NAA+PROBE: NOT DETECTED
N GONORRHOEA DNA SPEC QL NAA+PROBE: NOT DETECTED

## 2018-02-02 ENCOUNTER — TELEPHONE (OUTPATIENT)
Dept: NEUROLOGY | Facility: CLINIC | Age: 63
End: 2018-02-02

## 2018-02-02 NOTE — TELEPHONE ENCOUNTER
----- Message from Tae Chisholm sent at 2/2/2018  8:46 AM CST -----  Contact: Keena Brink would like a call back from a nurse to discuss medication changes. Call [phone] (home)   Thanks!

## 2018-03-09 ENCOUNTER — OFFICE VISIT (OUTPATIENT)
Dept: NEUROLOGY | Facility: CLINIC | Age: 63
End: 2018-03-09
Payer: COMMERCIAL

## 2018-03-09 VITALS
HEART RATE: 73 BPM | SYSTOLIC BLOOD PRESSURE: 140 MMHG | WEIGHT: 143.38 LBS | BODY MASS INDEX: 25.41 KG/M2 | DIASTOLIC BLOOD PRESSURE: 71 MMHG | RESPIRATION RATE: 16 BRPM | HEIGHT: 63 IN

## 2018-03-09 DIAGNOSIS — G40.909 SEIZURE DISORDER: Primary | ICD-10-CM

## 2018-03-09 DIAGNOSIS — F32.A DEPRESSION, UNSPECIFIED DEPRESSION TYPE: ICD-10-CM

## 2018-03-09 DIAGNOSIS — R41.3 MEMORY LOSS: ICD-10-CM

## 2018-03-09 PROCEDURE — 3077F SYST BP >= 140 MM HG: CPT | Mod: CPTII,S$GLB,, | Performed by: PSYCHIATRY & NEUROLOGY

## 2018-03-09 PROCEDURE — 99214 OFFICE O/P EST MOD 30 MIN: CPT | Mod: S$GLB,,, | Performed by: PSYCHIATRY & NEUROLOGY

## 2018-03-09 PROCEDURE — 99999 PR PBB SHADOW E&M-EST. PATIENT-LVL III: CPT | Mod: PBBFAC,,, | Performed by: PSYCHIATRY & NEUROLOGY

## 2018-03-09 PROCEDURE — 3078F DIAST BP <80 MM HG: CPT | Mod: CPTII,S$GLB,, | Performed by: PSYCHIATRY & NEUROLOGY
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
## 2018-05-07 RX ORDER — BACLOFEN 10 MG/1
10 TABLET ORAL NIGHTLY PRN
Qty: 30 TABLET | Refills: 11 | Status: SHIPPED | OUTPATIENT
Start: 2018-05-07 | End: 2019-01-09

## 2018-05-10 RX ORDER — LAMOTRIGINE 300 MG/1
300 TABLET, EXTENDED RELEASE ORAL DAILY
Qty: 90 TABLET | Refills: 3 | Status: SHIPPED | OUTPATIENT
Start: 2018-05-10 | End: 2019-05-30 | Stop reason: SDUPTHER

## 2018-06-13 ENCOUNTER — OFFICE VISIT (OUTPATIENT)
Dept: FAMILY MEDICINE | Facility: CLINIC | Age: 63
End: 2018-06-13
Payer: COMMERCIAL

## 2018-06-13 VITALS
TEMPERATURE: 98 F | DIASTOLIC BLOOD PRESSURE: 82 MMHG | BODY MASS INDEX: 24.47 KG/M2 | WEIGHT: 138.13 LBS | RESPIRATION RATE: 16 BRPM | HEIGHT: 63 IN | SYSTOLIC BLOOD PRESSURE: 136 MMHG | HEART RATE: 64 BPM

## 2018-06-13 DIAGNOSIS — I10 ESSENTIAL HYPERTENSION: Primary | ICD-10-CM

## 2018-06-13 DIAGNOSIS — G40.909 SEIZURE DISORDER: ICD-10-CM

## 2018-06-13 DIAGNOSIS — K76.0 NAFLD (NONALCOHOLIC FATTY LIVER DISEASE): ICD-10-CM

## 2018-06-13 DIAGNOSIS — Z23 NEED FOR SHINGLES VACCINE: ICD-10-CM

## 2018-06-13 DIAGNOSIS — Z85.72 HISTORY OF LYMPHOMA: ICD-10-CM

## 2018-06-13 DIAGNOSIS — E78.5 HYPERLIPIDEMIA, UNSPECIFIED HYPERLIPIDEMIA TYPE: ICD-10-CM

## 2018-06-13 DIAGNOSIS — R41.3 MEMORY PROBLEM: ICD-10-CM

## 2018-06-13 DIAGNOSIS — F33.0 MAJOR DEPRESSIVE DISORDER, RECURRENT EPISODE, MILD: ICD-10-CM

## 2018-06-13 PROCEDURE — 99214 OFFICE O/P EST MOD 30 MIN: CPT | Mod: S$GLB,,, | Performed by: INTERNAL MEDICINE

## 2018-06-13 PROCEDURE — 3075F SYST BP GE 130 - 139MM HG: CPT | Mod: CPTII,S$GLB,, | Performed by: INTERNAL MEDICINE

## 2018-06-13 PROCEDURE — 3008F BODY MASS INDEX DOCD: CPT | Mod: CPTII,S$GLB,, | Performed by: INTERNAL MEDICINE

## 2018-06-13 PROCEDURE — 3079F DIAST BP 80-89 MM HG: CPT | Mod: CPTII,S$GLB,, | Performed by: INTERNAL MEDICINE

## 2018-06-13 NOTE — PROGRESS NOTES
Subjective:       Patient ID: Keena Banks is a 63 y.o. female.    Medication List with Changes/Refills   Current Medications    BACLOFEN (LIORESAL) 10 MG TABLET    Take 1 tablet (10 mg total) by mouth nightly as needed.    ESTRACE 0.01 % (0.1 MG/GRAM) VAGINAL CREAM    PLACE 2 GRAMS VAGINALLY EVERY 7 DAYS    FLAXSEED OIL 1,030 MG CAP    Take 1 capsule by mouth once daily. Every morning    FLUTICASONE (FLONASE) 50 MCG/ACTUATION NASAL SPRAY    2 sprays by Each Nare route once daily.    FOLIC ACID (FOLVITE) 1 MG TABLET    Take 1 mg by mouth once daily.      HYDROCHLOROTHIAZIDE (HYDRODIURIL) 25 MG TABLET    TAKE 1 TABLET BY MOUTH EVERY DAY    IBUPROFEN (ADVIL,MOTRIN) 200 MG TABLET    Take 400 mg by mouth every 6 (six) hours as needed for Pain.    LAMOTRIGINE XR (LAMICTAL XR) 300 MG XR TABLET    Take 1 tablet (300 mg total) by mouth once daily.    MULTIVITAMIN WITH MINERALS TABLET    Take 1 tablet by mouth once daily.      ROSUVASTATIN (CRESTOR) 20 MG TABLET    TAKE 1 TABLET BY MOUTH ONCE DAILY    VENLAFAXINE (EFFEXOR-XR) 150 MG CP24    TAKE 1 CAPSULE (150 MG TOTAL) BY MOUTH ONCE DAILY.       Chief Complaint: Follow-up  She is here today to f/u on her chronic medical issues.      She has hypertension that is controlled with HCTZ 25 mg qday. She says her home BP reading are good. She has no known CAD. She denies chest pain or shortness of breath.      She has hyperlipidemia controlled on crestor 20 mg qday. Her last lipids were done on 1/2018 were 177/57/90/75.  She is taking aspirin daily.      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 She is taking lamictal  mg qday. She is doing very well. She has not had a seizure for over 5 years. She does say the lamictal affects her memory but no other side effects. She follows with neurology annually and was last seen on 3/2018 and he ordered neuropsych testing which is being done next week.      She had an  ultrasound showing fatty infiltration of her liver. She has normal LFTs.       She had depression with anxiety that is controlled on venlafaxine 150 mg qday.  She feels she is doing well on this dose of medication.  Her anxiety is improved since the issues with her granddaughter have settled.  She says she still gets irritable at time.  No suicidal ideations. No sleep issues.       She has a history of NHL(stage III A diffuse large B cell lymphoma) s/p 8 cycles of chemotherapy with CHOP/rituxan. She was  seen by oncology annual and her last appt was on 8/2017.  She continues to be in remission and will f/u in one year.      She continues has now retired from [employer] and is very happy with this decision. . She does walk 3 times a week but does not exercise. She tries to eat healthy. She continues to raise her 11 year old granddaughter who has ADD and emotional issues.     Colonoscopy---8/2016 repeat in 8 years  Mammogram----1/2018 neg  DEXA-----8/2017 normal  Pap-----once since [facility] neg  Tdap---7/2008  Influenza vaccine---8/2017  Pneumovax 23----11/2015  Prevnar 13----12/2016  Shingles vaccine-----11/2015    Review of Systems   Constitutional: Negative for appetite change, fatigue, fever and unexpected weight change.   HENT: Negative for congestion, ear pain, hearing loss, sore throat and trouble swallowing.    Eyes: Negative for pain and visual disturbance.   Respiratory: Negative for cough, chest tightness, shortness of breath and wheezing.    Cardiovascular: Negative for chest pain, palpitations and leg swelling.   Gastrointestinal: Negative for abdominal pain, blood in stool, constipation, diarrhea, nausea and vomiting.   Endocrine: Negative for polyuria.   Genitourinary: Negative for dysuria and hematuria.   Musculoskeletal: Positive for arthralgias (hands and knees and shoulder). Negative for back pain and myalgias.   Skin: Negative for rash.   Neurological: Negative for dizziness, weakness, numbness and  "headaches.   Hematological: Does not bruise/bleed easily.   Psychiatric/Behavioral: Positive for dysphoric mood. Negative for sleep disturbance and suicidal ideas. The patient is not nervous/anxious.        Objective:      Vitals:    06/13/18 0950   BP: 136/82   Pulse: 64   Resp: 16   Temp: 98 °F (36.7 °C)   TempSrc: Oral   Weight: 62.6 kg (138 lb 1.9 oz)   Height: 5' 3" (1.6 m)     Body mass index is 24.47 kg/m².  Physical Exam    General appearance: No acute distress, cooperative  Neck: FROM, soft, supple, no thyromegaly, no bruits  Lymph: no anterior or posterior cervical adenopathy  Heart::  Regular rate and rhythm, no murmur  Lung: Clear to ascultation bilaterally, no wheezing, no rales, no rhonchi, no distress  Abdomen: Soft, nontender, no distention, no hepatosplenomegaly, bowel sounds normal, no guarding, no rebound, no peritoneal signs  Skin: no rashes, no lesions  Extremities: no edema, no cyanosis  Neuro: no focal abnormalities, strength 5/5 b/l UE and LE, 2+ DTRs b/l UE and LE, normal gait  Peripheral pulses: 2+ pedal pulses bilaterally, good perfusion and color  Musculoskeletal: FROM, good strenth, no tenderness  Joint: normal appearance, no swelling, no warmth, no deformity in all joints    Assessment:       1. Essential hypertension    2. Hyperlipidemia, unspecified hyperlipidemia type    3. NAFLD (nonalcoholic fatty liver disease)    4. Major depressive disorder, recurrent episode, mild    5. Seizure disorder    6. Memory problem    7. History of lymphoma    8. Need for shingles vaccine        Plan:       Essential hypertension  Good control on this regimen.     Hyperlipidemia, unspecified hyperlipidemia type  Good control and she is on aspirin daily.     NAFLD (nonalcoholic fatty liver disease)  Stable and continue to follow. SHe recently lost 12 lbs with dietary changes.     Major depressive disorder, recurrent episode, mild  Controlled on effexor.     Seizure disorder  STable on lamictal and " continues to follow with neurology.     Memory problem  She suspects due to lamictal. She is having neuropsych testing next week.     History of lymphoma  No active diease and she follows annually with oncology.     Need for shingles vaccine  -     varicella-zoster [id number], PF, (SHINGRIX, PF,) 50 mcg/0.5 mL injection; Inject 0.5 mLs into the muscle once.  Dispense: 0.5 mL; Refill: 1    Follow-up in about 6 months (around 12/13/2018) for chronic medical issues.

## 2018-06-18 ENCOUNTER — INITIAL CONSULT (OUTPATIENT)
Dept: NEUROLOGY | Facility: CLINIC | Age: 63
End: 2018-06-18
Payer: COMMERCIAL

## 2018-06-18 DIAGNOSIS — R41.3 MEMORY LOSS: ICD-10-CM

## 2018-06-18 DIAGNOSIS — F32.A DEPRESSION, UNSPECIFIED DEPRESSION TYPE: ICD-10-CM

## 2018-06-18 DIAGNOSIS — G40.909 SEIZURE DISORDER: ICD-10-CM

## 2018-06-18 PROCEDURE — 96118 PR NEUROPSYCH TESTING BY PSYCH/PHYS: CPT | Mod: S$GLB,,, | Performed by: PSYCHIATRY & NEUROLOGY

## 2018-06-18 PROCEDURE — 99499 UNLISTED E&M SERVICE: CPT | Mod: S$GLB,,, | Performed by: PSYCHIATRY & NEUROLOGY

## 2018-06-18 PROCEDURE — 90791 PSYCH DIAGNOSTIC EVALUATION: CPT | Mod: S$GLB,,, | Performed by: PSYCHIATRY & NEUROLOGY
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
## 2018-06-25 ENCOUNTER — TELEPHONE (OUTPATIENT)
Dept: NEUROLOGY | Facility: CLINIC | Age: 63
End: 2018-06-25

## 2018-06-25 NOTE — TELEPHONE ENCOUNTER
NEUROPSYCHOLOGICAL EVALUATION - CONFIDENTIAL  FEEDBACK NOTE    On 6/25/18, I provided Ms. Keena Banks and her  the results of her neuropsychological evaluation. Please see her full report for a comprehensive overview of the findings. She was provided a copy of the report and invited to call with additional questions.      Gerson Herrera Psy.D., ABPP  Board Certified in Clinical Neuropsychology  [facility] - Department of Neurology

## 2018-07-11 ENCOUNTER — OFFICE VISIT (OUTPATIENT)
Dept: NEUROLOGY | Facility: CLINIC | Age: 63
End: 2018-07-11
Payer: COMMERCIAL

## 2018-07-11 VITALS
HEART RATE: 65 BPM | WEIGHT: 141 LBS | HEIGHT: 63 IN | DIASTOLIC BLOOD PRESSURE: 85 MMHG | SYSTOLIC BLOOD PRESSURE: 170 MMHG | RESPIRATION RATE: 20 BRPM | BODY MASS INDEX: 24.98 KG/M2

## 2018-07-11 DIAGNOSIS — G40.909 SEIZURE DISORDER: Primary | ICD-10-CM

## 2018-07-11 DIAGNOSIS — E78.5 HYPERLIPIDEMIA, UNSPECIFIED HYPERLIPIDEMIA TYPE: ICD-10-CM

## 2018-07-11 DIAGNOSIS — F33.0 MAJOR DEPRESSIVE DISORDER, RECURRENT, MILD: ICD-10-CM

## 2018-07-11 PROCEDURE — 3079F DIAST BP 80-89 MM HG: CPT | Mod: CPTII,S$GLB,, | Performed by: PSYCHIATRY & NEUROLOGY

## 2018-07-11 PROCEDURE — 3008F BODY MASS INDEX DOCD: CPT | Mod: CPTII,S$GLB,, | Performed by: PSYCHIATRY & NEUROLOGY

## 2018-07-11 PROCEDURE — 99214 OFFICE O/P EST MOD 30 MIN: CPT | Mod: S$GLB,,, | Performed by: PSYCHIATRY & NEUROLOGY

## 2018-07-11 PROCEDURE — 99999 PR PBB SHADOW E&M-EST. PATIENT-LVL III: CPT | Mod: PBBFAC,,, | Performed by: PSYCHIATRY & NEUROLOGY

## 2018-07-11 PROCEDURE — 3077F SYST BP >= 140 MM HG: CPT | Mod: CPTII,S$GLB,, | Performed by: PSYCHIATRY & NEUROLOGY

## 2018-07-11 RX ORDER — ROSUVASTATIN CALCIUM 20 MG/1
TABLET, COATED ORAL
Qty: 90 TABLET | Refills: 3 | Status: SHIPPED | OUTPATIENT
Start: 2018-07-11 | End: 2019-07-08 | Stop reason: SDUPTHER
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
## 2018-08-24 ENCOUNTER — LAB VISIT (OUTPATIENT)
Dept: LAB | Facility: HOSPITAL | Age: 63
End: 2018-08-24
Attending: NURSE PRACTITIONER
Payer: COMMERCIAL

## 2018-08-24 DIAGNOSIS — C83.30 DIFFUSE LARGE B-CELL LYMPHOMA, UNSPECIFIED BODY REGION: ICD-10-CM

## 2018-08-24 LAB
ALBUMIN SERPL BCP-MCNC: 4.3 G/DL
ALP SERPL-CCNC: 117 U/L
ALT SERPL W/O P-5'-P-CCNC: 26 U/L
ANION GAP SERPL CALC-SCNC: 11 MMOL/L
AST SERPL-CCNC: 29 U/L
B2 MICROGLOB SERPL-MCNC: 1.5 UG/ML
BASOPHILS # BLD AUTO: 0.04 K/UL
BASOPHILS NFR BLD: 0.6 %
BILIRUB SERPL-MCNC: 0.4 MG/DL
BUN SERPL-MCNC: 16 MG/DL
CALCIUM SERPL-MCNC: 9.9 MG/DL
CHLORIDE SERPL-SCNC: 102 MMOL/L
CO2 SERPL-SCNC: 29 MMOL/L
CREAT SERPL-MCNC: 0.9 MG/DL
DIFFERENTIAL METHOD: ABNORMAL
EOSINOPHIL # BLD AUTO: 0.2 K/UL
EOSINOPHIL NFR BLD: 2.4 %
ERYTHROCYTE [DISTWIDTH] IN BLOOD BY AUTOMATED COUNT: 12.1 %
EST. GFR  (AFRICAN AMERICAN): >60 ML/MIN/1.73 M^2
EST. GFR  (NON AFRICAN AMERICAN): >60 ML/MIN/1.73 M^2
GLUCOSE SERPL-MCNC: 93 MG/DL
HCT VFR BLD AUTO: 36.8 %
HGB BLD-MCNC: 12.4 G/DL
LDH SERPL L TO P-CCNC: 254 U/L
LYMPHOCYTES # BLD AUTO: 1.9 K/UL
LYMPHOCYTES NFR BLD: 30.7 %
MCH RBC QN AUTO: 30 PG
MCHC RBC AUTO-ENTMCNC: 33.7 G/DL
MCV RBC AUTO: 89 FL
MONOCYTES # BLD AUTO: 0.4 K/UL
MONOCYTES NFR BLD: 6.3 %
NEUTROPHILS # BLD AUTO: 3.7 K/UL
NEUTROPHILS NFR BLD: 60 %
PLATELET # BLD AUTO: 268 K/UL
PMV BLD AUTO: 10 FL
POTASSIUM SERPL-SCNC: 3.5 MMOL/L
PROT SERPL-MCNC: 7 G/DL
RBC # BLD AUTO: 4.13 M/UL
SODIUM SERPL-SCNC: 142 MMOL/L
WBC # BLD AUTO: 6.22 K/UL

## 2018-08-24 PROCEDURE — 85025 COMPLETE CBC W/AUTO DIFF WBC: CPT | Mod: PO

## 2018-08-24 PROCEDURE — 80053 COMPREHEN METABOLIC PANEL: CPT | Mod: PO

## 2018-08-24 PROCEDURE — 82232 ASSAY OF BETA-2 PROTEIN: CPT

## 2018-08-24 PROCEDURE — 36415 COLL VENOUS BLD VENIPUNCTURE: CPT | Mod: PO

## 2018-08-24 PROCEDURE — 83615 LACTATE (LD) (LDH) ENZYME: CPT | Mod: PO

## 2018-08-27 ENCOUNTER — OFFICE VISIT (OUTPATIENT)
Dept: HEMATOLOGY/ONCOLOGY | Facility: CLINIC | Age: 63
End: 2018-08-27
Payer: COMMERCIAL

## 2018-08-27 VITALS
WEIGHT: 138.88 LBS | SYSTOLIC BLOOD PRESSURE: 149 MMHG | HEART RATE: 69 BPM | BODY MASS INDEX: 24.61 KG/M2 | TEMPERATURE: 99 F | DIASTOLIC BLOOD PRESSURE: 73 MMHG | HEIGHT: 63 IN | RESPIRATION RATE: 18 BRPM

## 2018-08-27 DIAGNOSIS — C82.90 NHL (NODULAR HISTIOCYTIC LYMPHOMA): Primary | ICD-10-CM

## 2018-08-27 PROCEDURE — 99213 OFFICE O/P EST LOW 20 MIN: CPT | Mod: S$GLB,,, | Performed by: NURSE PRACTITIONER

## 2018-08-27 PROCEDURE — 3077F SYST BP >= 140 MM HG: CPT | Mod: CPTII,S$GLB,, | Performed by: NURSE PRACTITIONER

## 2018-08-27 PROCEDURE — 3078F DIAST BP <80 MM HG: CPT | Mod: CPTII,S$GLB,, | Performed by: NURSE PRACTITIONER

## 2018-08-27 PROCEDURE — 3008F BODY MASS INDEX DOCD: CPT | Mod: CPTII,S$GLB,, | Performed by: NURSE PRACTITIONER

## 2018-08-27 PROCEDURE — 99999 PR PBB SHADOW E&M-EST. PATIENT-LVL IV: CPT | Mod: PBBFAC,,, | Performed by: NURSE PRACTITIONER

## 2018-08-27 NOTE — PROGRESS NOTES
HISTORY OF PRESENT ILLNESS:  This is a 63-year-old white female known   to Dr. Campos for Stage IIIA diffuse large B-cell non-Hodgkin lymphoma that   presented with adenopathy in the left supraclavicular region.  She was treated   with 8 cycles of CHOP/Rituxan and went into complete remission.  She presents   to the clinic today for her 72-month post-therapy evaluation in good spirits.  She   remains active and well.  She denies any recurrent illness, fevers, chills, drenching   night sweats, painful lymphadenopathy, unexplained weight loss, rashes, pruritus,   abdominal discomfort/bloating, nausea, vomiting, constipation, diarrhea, irregular   heartbeat, chest pain, bleeding, etc. She reports a skin cancer that was removed   by Dr. Mena.  She is following with her every 6 months.  No other new complaints   or pertinent findings on 14-point review of systems.    PHYSICAL EXAMINATION:  GENERAL:  Well-developed, well-nourished white female in no acute distress.    Alert and oriented x3.  VITAL SIGNS:  Weight:  Loss of 9 pounds in 1 year with diet  Wt Readings from Last 3 Encounters:   08/27/18 63 kg (138 lb 14.2 oz)   07/11/18 64 kg (141 lb)   06/13/18 62.6 kg (138 lb 1.9 oz)     Temp Readings from Last 3 Encounters:   08/27/18 98.8 °F (37.1 °C)   06/13/18 98 °F (36.7 °C) (Oral)   01/11/18 98.4 °F (36.9 °C) (Oral)     BP Readings from Last 3 Encounters:   08/27/18 (!) 149/73   07/11/18 (!) 170/85   06/13/18 136/82     Pulse Readings from Last 3 Encounters:   08/27/18 69   07/11/18 65   06/13/18 64     HEENT:  Normocephalic, atraumatic.  Oral mucosa pink and moist.  Lips without   lesions.  Tongue midline.  Oropharynx clear.  Nonicteric sclerae.  NECK:  Supple, no adenopathy.  No carotid bruits, thyromegaly or thyroid nodule.  HEART:  Regular rate and rhythm without murmur, gallop or rub.  LUNGS:  Clear to auscultation bilaterally.  Normal respiratory effort.  ABDOMEN:  Soft, nontender, nondistended with positive  normoactive bowel sounds,   no hepatosplenomegaly.  EXTREMITIES:  No cyanosis, clubbing or edema.  Distal pulses are intact.  AXILLAE AND GROIN:  No palpable pathologic lymphadenopathy is appreciated.  SKIN:  Intact/turgor normal.  NEUROLOGIC:  Cranial nerves II-XII grossly intact.  Motor:  Good muscle bulk and   tone.  Strength/sensory 5/5 throughout.  Gait stable.    LABORATORY:    Lab Results   Component Value Date    WBC 6.22 08/24/2018    HGB 12.4 08/24/2018    HCT 36.8 (L) 08/24/2018    MCV 89 08/24/2018     08/24/2018     Unremarkable differential    CMP  Sodium   Date Value Ref Range Status   08/24/2018 142 136 - 145 mmol/L Final     Potassium   Date Value Ref Range Status   08/24/2018 3.5 3.5 - 5.1 mmol/L Final     Chloride   Date Value Ref Range Status   08/24/2018 102 95 - 110 mmol/L Final     CO2   Date Value Ref Range Status   08/24/2018 29 23 - 29 mmol/L Final     Glucose   Date Value Ref Range Status   08/24/2018 93 70 - 110 mg/dL Final     BUN, Bld   Date Value Ref Range Status   08/24/2018 16 8 - 23 mg/dL Final     Creatinine   Date Value Ref Range Status   08/24/2018 0.9 0.5 - 1.4 mg/dL Final     Calcium   Date Value Ref Range Status   08/24/2018 9.9 8.7 - 10.5 mg/dL Final     Total Protein   Date Value Ref Range Status   08/24/2018 7.0 6.0 - 8.4 g/dL Final     Albumin   Date Value Ref Range Status   08/24/2018 4.3 3.5 - 5.2 g/dL Final     Total Bilirubin   Date Value Ref Range Status   08/24/2018 0.4 0.1 - 1.0 mg/dL Final     Comment:     For infants and newborns, interpretation of results should be based  on gestational age, weight and in agreement with clinical  observations.  Premature Infant recommended reference ranges:  Up to 24 hours.............<8.0 mg/dL  Up to 48 hours............<12.0 mg/dL  3-5 days..................<15.0 mg/dL  6-29 days.................<15.0 mg/dL       Alkaline Phosphatase   Date Value Ref Range Status   08/24/2018 117 55 - 135 U/L Final     AST   Date Value  Ref Range Status   08/24/2018 29 10 - 40 U/L Final     ALT   Date Value Ref Range Status   08/24/2018 26 10 - 44 U/L Final     Anion Gap   Date Value Ref Range Status   08/24/2018 11 8 - 16 mmol/L Final     eGFR if    Date Value Ref Range Status   08/24/2018 >60 >60 mL/min/1.73 m^2 Final     eGFR if non    Date Value Ref Range Status   08/24/2018 >60 >60 mL/min/1.73 m^2 Final     Comment:     Calculation used to obtain the estimated glomerular filtration  rate (eGFR) is the CKD-EPI equation.        , [id number] 1.5    IMPRESSION:  1.  Stage IIIA diffuse large B-cell non-Hodgkin lymphoma - MUNIR.  2.  Idiopathic seizure disorder - stable.  3.  Elevated liver function test - fatty liver, remains stable.  4.  Skin cancer - follows Dr. Mena every 6 months    PLAN: Return in one year with interval CBC, CMP, LDH, and beta-2 microglobulin   for annual surveillance.    Assessment/plan reviewed and approved by Dr. Campos.

## 2018-10-09 DIAGNOSIS — F33.0 MAJOR DEPRESSIVE DISORDER, RECURRENT, MILD: ICD-10-CM

## 2018-10-09 RX ORDER — VENLAFAXINE HYDROCHLORIDE 150 MG/1
150 CAPSULE, EXTENDED RELEASE ORAL DAILY
Qty: 90 CAPSULE | Refills: 2 | Status: SHIPPED | OUTPATIENT
Start: 2018-10-09 | End: 2019-07-08 | Stop reason: SDUPTHER

## 2018-10-30 RX ORDER — HYDROCHLOROTHIAZIDE 25 MG/1
TABLET ORAL
Qty: 90 TABLET | Refills: 3 | Status: SHIPPED | OUTPATIENT
Start: 2018-10-30 | End: 2019-07-10 | Stop reason: SDUPTHER

## 2019-01-09 ENCOUNTER — OFFICE VISIT (OUTPATIENT)
Dept: FAMILY MEDICINE | Facility: CLINIC | Age: 64
End: 2019-01-09
Payer: COMMERCIAL

## 2019-01-09 VITALS
OXYGEN SATURATION: 96 % | DIASTOLIC BLOOD PRESSURE: 76 MMHG | WEIGHT: 142.44 LBS | TEMPERATURE: 98 F | HEIGHT: 63 IN | SYSTOLIC BLOOD PRESSURE: 116 MMHG | BODY MASS INDEX: 25.24 KG/M2 | HEART RATE: 71 BPM

## 2019-01-09 DIAGNOSIS — N30.00 ACUTE CYSTITIS WITHOUT HEMATURIA: Primary | ICD-10-CM

## 2019-01-09 DIAGNOSIS — R30.0 DYSURIA: ICD-10-CM

## 2019-01-09 LAB
BACTERIA #/AREA URNS HPF: ABNORMAL /HPF
BILIRUB UR QL STRIP: NEGATIVE
CLARITY UR: CLEAR
COLOR UR: YELLOW
GLUCOSE UR QL STRIP: NEGATIVE
HGB UR QL STRIP: NEGATIVE
KETONES UR QL STRIP: NEGATIVE
LEUKOCYTE ESTERASE UR QL STRIP: ABNORMAL
MICROSCOPIC COMMENT: ABNORMAL
NITRITE UR QL STRIP: POSITIVE
PH UR STRIP: 7 [PH] (ref 5–8)
PROT UR QL STRIP: NEGATIVE
RBC #/AREA URNS HPF: 2 /HPF (ref 0–4)
SP GR UR STRIP: 1.01 (ref 1–1.03)
URN SPEC COLLECT METH UR: ABNORMAL
WBC #/AREA URNS HPF: 25 /HPF (ref 0–5)

## 2019-01-09 PROCEDURE — 3074F PR MOST RECENT SYSTOLIC BLOOD PRESSURE < 130 MM HG: ICD-10-PCS | Mod: CPTII,S$GLB,, | Performed by: NURSE PRACTITIONER

## 2019-01-09 PROCEDURE — 3078F PR MOST RECENT DIASTOLIC BLOOD PRESSURE < 80 MM HG: ICD-10-PCS | Mod: CPTII,S$GLB,, | Performed by: NURSE PRACTITIONER

## 2019-01-09 PROCEDURE — 87088 URINE BACTERIA CULTURE: CPT

## 2019-01-09 PROCEDURE — 3074F SYST BP LT 130 MM HG: CPT | Mod: CPTII,S$GLB,, | Performed by: NURSE PRACTITIONER

## 2019-01-09 PROCEDURE — 3008F BODY MASS INDEX DOCD: CPT | Mod: CPTII,S$GLB,, | Performed by: NURSE PRACTITIONER

## 2019-01-09 PROCEDURE — 3008F PR BODY MASS INDEX (BMI) DOCUMENTED: ICD-10-PCS | Mod: CPTII,S$GLB,, | Performed by: NURSE PRACTITIONER

## 2019-01-09 PROCEDURE — 87077 CULTURE AEROBIC IDENTIFY: CPT

## 2019-01-09 PROCEDURE — 99214 OFFICE O/P EST MOD 30 MIN: CPT | Mod: S$GLB,,, | Performed by: NURSE PRACTITIONER

## 2019-01-09 PROCEDURE — 99999 PR PBB SHADOW E&M-EST. PATIENT-LVL IV: CPT | Mod: PBBFAC,,, | Performed by: NURSE PRACTITIONER

## 2019-01-09 PROCEDURE — 87186 SC STD MICRODIL/AGAR DIL: CPT

## 2019-01-09 PROCEDURE — 87086 URINE CULTURE/COLONY COUNT: CPT

## 2019-01-09 PROCEDURE — 3078F DIAST BP <80 MM HG: CPT | Mod: CPTII,S$GLB,, | Performed by: NURSE PRACTITIONER

## 2019-01-09 PROCEDURE — 81000 URINALYSIS NONAUTO W/SCOPE: CPT | Mod: PO

## 2019-01-09 PROCEDURE — 99214 PR OFFICE/OUTPT VISIT, EST, LEVL IV, 30-39 MIN: ICD-10-PCS | Mod: S$GLB,,, | Performed by: NURSE PRACTITIONER

## 2019-01-09 PROCEDURE — 99999 PR PBB SHADOW E&M-EST. PATIENT-LVL IV: ICD-10-PCS | Mod: PBBFAC,,, | Performed by: NURSE PRACTITIONER

## 2019-01-09 RX ORDER — NITROFURANTOIN 25; 75 MG/1; MG/1
100 CAPSULE ORAL 2 TIMES DAILY
Qty: 14 CAPSULE | Refills: 0 | Status: SHIPPED | OUTPATIENT
Start: 2019-01-09 | End: 2019-01-16

## 2019-01-09 RX ORDER — PHENAZOPYRIDINE HYDROCHLORIDE 100 MG/1
100 TABLET, FILM COATED ORAL 3 TIMES DAILY PRN
Qty: 6 TABLET | Refills: 0 | Status: SHIPPED | OUTPATIENT
Start: 2019-01-09 | End: 2019-01-11

## 2019-01-09 NOTE — PATIENT INSTRUCTIONS

## 2019-01-09 NOTE — PROGRESS NOTES
Subjective:       Patient ID: Keena Banks is a 63 y.o. female.    Chief Complaint: Urinary Tract Infection    Patient who is new to me presents with uti symptoms.       Urinary Tract Infection    This is a new problem. The current episode started yesterday. The problem occurs every urination. The problem has been gradually worsening. The quality of the pain is described as burning. There has been no fever. Associated symptoms include frequency and urgency. Pertinent negatives include no behavior changes, chills, discharge, flank pain, nausea, vomiting, bubble bath use, constipation, rash or withholding. Associated symptoms comments: Bladder spasms. Treatments tried: AZOs, esting yogurt. There is no history of diabetes mellitus or recurrent UTIs.     Review of Systems   Constitutional: Negative for chills, fatigue and fever.   HENT: Negative for congestion, sinus pressure, sinus pain, sneezing and sore throat.    Respiratory: Negative for cough, chest tightness, shortness of breath and wheezing.    Cardiovascular: Negative for chest pain, palpitations and leg swelling.   Gastrointestinal: Negative for abdominal distention, abdominal pain, constipation, diarrhea, nausea and vomiting.   Genitourinary: Positive for dysuria, frequency, pelvic pain and urgency. Negative for decreased urine volume, difficulty urinating and flank pain.   Musculoskeletal: Negative for arthralgias, gait problem, joint swelling and myalgias.   Skin: Negative for rash and wound.   Neurological: Negative for dizziness, light-headedness, numbness and headaches.       Objective:      Physical Exam   Constitutional: She is oriented to person, place, and time. She appears well-developed and well-nourished.   HENT:   Head: Normocephalic and atraumatic.   Right Ear: External ear normal.   Left Ear: External ear normal.   Nose: Nose normal.   Mouth/Throat: Oropharynx is clear and moist.   Eyes: Pupils are equal, round, and reactive to light.   Neck:  Normal range of motion.   Cardiovascular: Normal rate, regular rhythm, normal heart sounds and intact distal pulses.   Pulmonary/Chest: Effort normal and breath sounds normal.   Abdominal: Soft. Bowel sounds are normal. There is no tenderness. There is no rigidity, no rebound and no CVA tenderness.   Musculoskeletal: Normal range of motion.   Neurological: She is alert and oriented to person, place, and time.   Skin: Skin is warm and dry.   Nursing note and vitals reviewed.      Assessment:       1. Acute cystitis without hematuria    2. Dysuria        Plan:       Acute cystitis without hematuria  -     Urine culture  -     Urinalysis  -     Urinalysis Microscopic  -     nitrofurantoin, macrocrystal-monohydrate, (MACROBID) 100 MG capsule; Take 1 capsule (100 mg total) by mouth 2 (two) times daily. for 7 days  Dispense: 14 capsule; Refill: 0  -     phenazopyridine (PYRIDIUM) 100 MG tablet; Take 1 tablet (100 mg total) by mouth 3 (three) times daily as needed for Pain.  Dispense: 6 tablet; Refill: 0    Dysuria  -     Cancel: Urinalysis  -     Urine culture  -     Cancel: POCT URINE DIPSTICK WITHOUT MICROSCOPE  -     Urinalysis  -     Urinalysis Microscopic  -     nitrofurantoin, macrocrystal-monohydrate, (MACROBID) 100 MG capsule; Take 1 capsule (100 mg total) by mouth 2 (two) times daily. for 7 days  Dispense: 14 capsule; Refill: 0  -     phenazopyridine (PYRIDIUM) 100 MG tablet; Take 1 tablet (100 mg total) by mouth 3 (three) times daily as needed for Pain.  Dispense: 6 tablet; Refill: 0      Advised increase fluids, frequent hydration, voiding frequently, and avoiding bladder irritants. Will follow up on urine culture. Advised to follow up if no better or worsening symptoms in 2-3 days.

## 2019-01-12 LAB — BACTERIA UR CULT: NORMAL

## 2019-01-16 DIAGNOSIS — M25.511 RIGHT SHOULDER PAIN: Primary | ICD-10-CM

## 2019-01-23 ENCOUNTER — CLINICAL SUPPORT (OUTPATIENT)
Dept: REHABILITATION | Facility: HOSPITAL | Age: 64
End: 2019-01-23
Payer: COMMERCIAL

## 2019-01-23 DIAGNOSIS — M25.511 RIGHT SHOULDER PAIN, UNSPECIFIED CHRONICITY: ICD-10-CM

## 2019-01-23 DIAGNOSIS — M62.81 MUSCLE WEAKNESS: ICD-10-CM

## 2019-01-23 PROCEDURE — 97161 PT EVAL LOW COMPLEX 20 MIN: CPT | Mod: PN

## 2019-01-23 PROCEDURE — 97110 THERAPEUTIC EXERCISES: CPT | Mod: PN

## 2019-01-23 NOTE — PROGRESS NOTES
[facility]  Physical Therapy Initial Evaluation    Name: Keena Banks  Clinic Number: [id number]    Therapy Diagnosis:   Encounter Diagnoses   Name Primary?    Right shoulder pain, unspecified chronicity     Muscle weakness      Physician: Maryellen Jackson FNP    Physician Orders: PT Eval and Treat   Medical Diagnosis: R shoulder pain  Evaluation Date: 1/23/2019  Authorization period Expiration: 1/16/20  Plan of Care Certification Period: 3/20/19    Visit #: 1/ Visits authorized: 30  Time In:10:00  Time Out: 11:00  Total Billable Time: 60 minutes    Precautions: major depression  Subjective   Date of onset: pt was putting [place] decorations back up in the attic and cleaning the house and started having R shoulder pain.  She was not able to lift her arm without pain.  She had an injection to R should 1 week ago which has decreased pain and now sh has increased ROM.         Past Medical History:   Diagnosis Date    Depression     anxiety and depression situational    Hypertension     Menopausal symptom     vaginal dryness and hot flashes    NHL (non-Hodgkin's lymphoma) 2013    s/p 8 cycles of CHOP/Rituxan    Osteoarthritis     both hands    Seasonal allergies     seasonal and animal    Seizures 2013    Started 11/2014 after treatment for NHL, MRI brain normal, Grand mal 10/2014 and 6/2014 started several months after chemo     Keena Banks  has a past surgical history that includes Carpal tunnel release (Right); Lymph node biopsy (01/24/12); Vaginal delivery; Tonsillectomy; Salivary gland surgery (Right); Hysterectomy; Colonoscopy (09/15/2006); Portacath placement; Bone marrow aspiration; Oophorectomy; portacath removal (2015); Carpal tunnel release; and Colonoscopy (N/A, 8/10/2016).    Keena has a current medication list which includes the following prescription(s): flaxseed oil, fluticasone, folic acid, hydrochlorothiazide, ibuprofen, lamotrigine xr, multivitamin with minerals,  rosuvastatin, and venlafaxine.     6 yrs since lymphoma, sees oncologist once/yr    Review of patient's allergies indicates:   Allergen Reactions    Adhesive Rash    Sulfa (sulfonamide antibiotics) Rash        Imaging, none in Epic for shoulder    Prior Therapy: none  Social History: lives with  and 11 yo daughter in 1 story house, 2 curb steps to enter back door   Occupation: retrired--worked in sales for [employer]  Prior Level of Function: I with all ADLs  Current Level of Function: difficulty with reaching overhead    Pain:  Current 1/10, worst 6/10, best 0/10   Location: shoulder  Right (anterior and down lateral R arm  Description: Aching, Dull and Throbbing  Aggravating Factors: initiating lifting R arm, lifting, R SL  Easing Factors: rest and Ibuprofen, ice     Pt walks in neighborhood (3x/week, 25 min)    Pts goals: decreased R shoulder pain, increased R shoulder, be able to garden without pain, improved overall health        Objective     Observation: pt is pleasant and cooperative, wears glasses    Posture: fwd head      Passive Range of Motion:   Shoulder Right   Flexion 160   Abduction 120 with pain and slight resistance      Active Range of Motion:   Shoulder Left Right   Flexion 155 157 stiff   Abduction 155 150, pain initiating movement   ER at 0 40 50   ER at 90 95 100, pain initiating ROM   IR T5 T9     Upper Extremity Strength  (R) UE  (L) UE    Shoulder flexion: 4+/5 Shoulder flexion: 5/5   Shoulder Abduction: 3+/5 pain Shoulder abduction: 5/5   Shoulder ER 4/5 Shoulder ER 4+/5   Shoulder IR 4/5 Shoulder IR 5/5   Lower Trap 3+/5 Lower Trap 3+/5   Middle Trap 3+/5 Middle Trap 4/5         Special Tests:  AC Joint Left Rigth   Empty Can Test - +       Joint Mobility: GH jt mobility WFL B without pain    Sensation: grossly intact to light touch, but pt reports occasional numbness to R hand      Scapular Control/Dyskinesis: R scap elevated and more protracted than L      PT Evaluation Completed?  Yes  Discussed Plan of Care with patient: Yes    TREATMENT   Treatment Time In: 10:45  Treatment Time Out: 11:00  Total Treatment time separate from Evaluation time:15    Keena received therapeutic exercises to develop strength, endurance, ROM, flexibility and posture for 15 minutes including:  AA sh flexion in supine with 1# dowel 10x5 sec  SL scap depression 10x5 sec  SL sh flex x10  SL sh abd x10  SL sh ER x10  Bruegger's x10, 3 sec hold      Home Exercises and Patient Education Provided    Education provided re: avoiding painful activities and hiking shoulders  - progress towards goals   - role of therapy in multi - disciplinary team, goals for therapy  No spiritual or educational barriers to learning provided    Written Home Exercises Provided: pt given verbal and written instruction for all ex listed above.  Exercises were reviewed and Keena was able to demonstrate them prior to the end of the session.   Pt received a written copy of exercises to perform at home. Keena demonstrated good  understanding of the education provided.       Assessment     Keena is a 63 y.o. female referred to outpatient Physical Therapy with a medical diagnosis of R shoulder pain. Pt presents with R shoulder pain, decreased UE strength, R scap elevation and protraction, pain initiating R shoulder flexion and abduction    Pt prognosis is Good.   Pt will benefit from skilled outpatient Physical Therapy to address the deficits stated above and in the chart below, provide pt/family education, and to maximize pt's level of independence.     Plan of care discussed with patient: Yes  Pt's spiritual, cultural and educational needs considered and pt agreeable to plan of care and goals as stated below:     Anticipated Barriers for therapy: none    Medical Necessity is demonstrated by the following  History  Co-morbidities and personal factors that may impact the plan of care Examination  Body Structures and Functions, activity limitations and  participation restrictions that may impact the plan of care    Clinical Presentation   Co-morbidities:   depression, history of cancer and HTN        Personal Factors:   no deficits Body Regions:   neck  upper extremities    Body Systems:    gross symmetry  ROM  strength            Participation Restrictions:        Activity limitations:   Learning and applying knowledge  no deficits    General Tasks and Commands  no deficits    Communication  no deficits    Mobility  lifting and carrying objects    Self care  washing oneself (bathing, drying, washing hands)    Domestic Life  shopping    Interactions/Relationships  no deficits    Life Areas  no deficits    Community and Social Life  community life  recreation and leisure         stable and uncomplicated                      low      Decision Making/ Complexity Score:  low         GOALS: Short Term Goals:  4-8 weeks  1.Report decreased    R shoulder    pain  <   / =  2  /10  to increase tolerance for ADLs  2. Increased R shoulder abduction AROM by 5 deg without pain for increased ease reaching overhead  3. Increased R UE strength by 1/3 muscle grade in all deficient planes to increase tolerance for ADL and work activities.  4. Pt to report ability to lie on R side without R shoulder pain for increased ease sleeping  5. Pt to tolerate HEP to improve ROM and independence with ADL's  6. Increased R shoulder IR AROM by 1 spinal level for increased ease donning bras.      Plan     Certification Period: 1/23/2019 to 3/20/19.    Outpatient Physical Therapy 2 times weekly for 8 weeks to include the following interventions: Manual Therapy, Moist Heat/ Ice, Patient Education, Self Care, Therapeutic Activites and Therapeutic Exercise.     Pat Chou, PT

## 2019-01-23 NOTE — PLAN OF CARE
[facility]  Physical Therapy Initial Evaluation    Name: Keena Banks  Clinic Number: [id number]    Therapy Diagnosis:   Encounter Diagnoses   Name Primary?    Right shoulder pain, unspecified chronicity     Muscle weakness      Physician: Maryellen Jackson FNP    Physician Orders: PT Eval and Treat   Medical Diagnosis: R shoulder pain  Evaluation Date: 1/23/2019  Authorization period Expiration: 1/16/20  Plan of Care Certification Period: 3/20/19    Visit #: 1/ Visits authorized: 30  Time In:10:00  Time Out: 11:00  Total Billable Time: 60 minutes    Precautions: major depression  Subjective   Date of onset: pt was putting [place] decorations back up in the attic and cleaning the house and started having R shoulder pain.  She was not able to lift her arm without pain.  She had an injection to R should 1 week ago which has decreased pain and now sh has increased ROM.         Past Medical History:   Diagnosis Date    Depression     anxiety and depression situational    Hypertension     Menopausal symptom     vaginal dryness and hot flashes    NHL (non-Hodgkin's lymphoma) 2013    s/p 8 cycles of CHOP/Rituxan    Osteoarthritis     both hands    Seasonal allergies     seasonal and animal    Seizures 2013    Started 11/2014 after treatment for NHL, MRI brain normal, Grand mal 10/2014 and 6/2014 started several months after chemo     Keena Banks  has a past surgical history that includes Carpal tunnel release (Right); Lymph node biopsy (01/24/12); Vaginal delivery; Tonsillectomy; Salivary gland surgery (Right); Hysterectomy; Colonoscopy (09/15/2006); Portacath placement; Bone marrow aspiration; Oophorectomy; portacath removal (2015); Carpal tunnel release; and Colonoscopy (N/A, 8/10/2016).    Keena has a current medication list which includes the following prescription(s): flaxseed oil, fluticasone, folic acid, hydrochlorothiazide, ibuprofen, lamotrigine xr, multivitamin with minerals,  rosuvastatin, and venlafaxine.     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[employer]  Prior Level of Function: I with all ADLs  Current Level of Function: difficulty with reaching overhead    Pain:  Current 1/10, worst 6/10, best 0/10   Location: shoulder  Right (anterior and down lateral R arm  Description: Aching, Dull and Throbbing  Aggravating Factors: initiating lifting R arm, lifting, R SL  Easing Factors: rest and Ibuprofen, ice     Pt walks in neighborhood (3x/week, 25 min)    Pts goals: decreased R shoulder pain, increased R shoulder, be able to garden without pain, improved overall health        Objective     Observation: pt is pleasant and cooperative, wears glasses    Posture: fwd head      Passive Range of Motion:   Shoulder Right   Flexion 160   Abduction 120 with pain and slight resistance      Active Range of Motion:   Shoulder Left Right   Flexion 155 157 stiff   Abduction 155 150, pain initiating movement   ER at 0 40 50   ER at 90 95 100, pain initiating ROM   IR T5 T9     Upper Extremity Strength  (R) UE  (L) UE    Shoulder flexion: 4+/5 Shoulder flexion: 5/5   Shoulder Abduction: 3+/5 pain Shoulder abduction: 5/5   Shoulder ER 4/5 Shoulder ER 4+/5   Shoulder IR 4/5 Shoulder IR 5/5   Lower Trap 3+/5 Lower Trap 3+/5   Middle Trap 3+/5 Middle Trap 4/5         Special Tests:  AC Joint Left Rigth   Empty Can Test - +       Joint Mobility: GH jt mobility WFL B without pain    Sensation: grossly intact to light touch, but pt reports occasional numbness to R hand      Scapular Control/Dyskinesis: R scap elevated and more protracted than L      PT Evaluation Completed?  Yes  Discussed Plan of Care with patient: Yes    TREATMENT   Treatment Time In: 10:45  Treatment Time Out: 11:00  Total Treatment time separate from Evaluation time:15    Keena received therapeutic exercises to develop strength, endurance, ROM, flexibility and posture for 15 minutes including:  AA sh flexion in supine with 1# dowel 10x5 sec  SL scap depression 10x5 sec  SL sh flex x10  SL sh abd x10  SL sh ER x10  Bruegger's x10, 3 sec hold      Home Exercises and Patient Education Provided    Education provided re: avoiding painful activities and hiking shoulders  - progress towards goals   - role of therapy in multi - disciplinary team, goals for therapy  No spiritual or educational barriers to learning provided    Written Home Exercises Provided: pt given verbal and written instruction for all ex listed above.  Exercises were reviewed and Keena was able to demonstrate them prior to the end of the session.   Pt received a written copy of exercises to perform at home. Keena demonstrated good  understanding of the education provided.       Assessment     Keena is a 63 y.o. female referred to outpatient Physical Therapy with a medical diagnosis of R shoulder pain. Pt presents with R shoulder pain, decreased UE strength, R scap elevation and protraction, pain initiating R shoulder flexion and abduction    Pt prognosis is Good.   Pt will benefit from skilled outpatient Physical Therapy to address the deficits stated above and in the chart below, provide pt/family education, and to maximize pt's level of independence.     Plan of care discussed with patient: Yes  Pt's spiritual, cultural and educational needs considered and pt agreeable to plan of care and goals as stated below:     Anticipated Barriers for therapy: none    Medical Necessity is demonstrated by the following  History  Co-morbidities and personal factors that may impact the plan of care Examination  Body Structures and Functions, activity limitations and  participation restrictions that may impact the plan of care    Clinical Presentation   Co-morbidities:   depression, history of cancer and HTN        Personal Factors:   no deficits Body Regions:   neck  upper extremities    Body Systems:    gross symmetry  ROM  strength            Participation Restrictions:        Activity limitations:   Learning and applying knowledge  no deficits    General Tasks and Commands  no deficits    Communication  no deficits    Mobility  lifting and carrying objects    Self care  washing oneself (bathing, drying, washing hands)    Domestic Life  shopping    Interactions/Relationships  no deficits    Life Areas  no deficits    Community and Social Life  community life  recreation and leisure         stable and uncomplicated                      low      Decision Making/ Complexity Score:  low         GOALS: Short Term Goals:  4-8 weeks  1.Report decreased    R shoulder    pain  <   / =  2  /10  to increase tolerance for ADLs  2. Increased R shoulder abduction AROM by 5 deg without pain for increased ease reaching overhead  3. Increased R UE strength by 1/3 muscle grade in all deficient planes to increase tolerance for ADL and work activities.  4. Pt to report ability to lie on R side without R shoulder pain for increased ease sleeping  5. Pt to tolerate HEP to improve ROM and independence with ADL's  6. Increased R shoulder IR AROM by 1 spinal level for increased ease donning bras.      Plan     Certification Period: 1/23/2019 to 3/20/19.    Outpatient Physical Therapy 2 times weekly for 8 weeks to include the following interventions: Manual Therapy, Moist Heat/ Ice, Patient Education, Self Care, Therapeutic Activites and Therapeutic Exercise.     Pat Chou, PT

## 2019-01-28 ENCOUNTER — CLINICAL SUPPORT (OUTPATIENT)
Dept: REHABILITATION | Facility: HOSPITAL | Age: 64
End: 2019-01-28
Payer: COMMERCIAL

## 2019-01-28 DIAGNOSIS — M62.81 MUSCLE WEAKNESS: ICD-10-CM

## 2019-01-28 DIAGNOSIS — M25.511 RIGHT SHOULDER PAIN, UNSPECIFIED CHRONICITY: ICD-10-CM

## 2019-01-28 PROCEDURE — 97140 MANUAL THERAPY 1/> REGIONS: CPT | Mod: PN

## 2019-01-28 PROCEDURE — 97110 THERAPEUTIC EXERCISES: CPT | Mod: PN

## 2019-01-28 NOTE — PROGRESS NOTES
"  Physical Therapy Daily Treatment Note     Name: Keena Banks  Clinic Number: [id number]    Therapy Diagnosis:   Encounter Diagnoses   Name Primary?    Right shoulder pain, unspecified chronicity     Muscle weakness      Physician: Maryellen Jackson FNP    Visit Date: 1/28/2019  Physician Orders: PT eval and treat  Medical Diagnosis: R shoulder pain  Evaluation Date: 1/23/19  Authorization Period Expiration: 1/16/20  Plan of Care Certification Period: 3/20/19  Visit #/Visits authorized: 2 / 30     Time In: 10:11  Time Out: 11:01  Total Billable Time: 50 minutes    Precautions: Standard    Subjective     Pt reports: overall improvement of the R shoulder symptoms. Is better with AROM and daily activities.  She was not as compliant with home exercise program, only did them once a day.  Response to previous treatment: felt better  Functional change: improved tolerance to daily activities    Pain: 0/10  Location: right shoulder      Objective     Keena received therapeutic exercises to develop strength, endurance, ROM and flexibility for 40 minutes including:    AA sh flexion in supine with 2# dowel 10x5 sec  SL scap depression 10x5 sec  SL sh ER 2x10  SL sh abd 2x10  SL sh flex 2x10  Bugger's 2x10, 3 sec hold  ADDED:  SL sleeper stretch 3x20"  PROM R shoulder  Wall slides flexion and abduction x 10 each    Keena received the following manual therapy techniques: Joint mobilizations were applied to the: R GH joint for 10 minutes, including:  AP and inferior glides gr I and II, gentle oscillations at end ranges all planes        Home Exercises Provided and Patient Education Provided     Education provided:   - slowly re-introduce activities into daily life to avoid over-straining and onset of pain into the R shoulder.  - emphasis on performing HEP at least once a day    Written Home Exercises Provided: yes. Patient given verbal and written instruction for sleeper stretch and wall slides into flexion and abduction. See Patient " instructions under Notes section of EMR.  Exercises were reviewed and Keena was able to demonstrate them prior to the end of the session.  Keena demonstrated good  understanding of the education provided.     Assessment     Keena tolerated treatment well overall this date with continued improvement of symptoms throughout session. Overall decreased tightness of the R GH joint and surrounding soft tissues. Responding well to exercises with improved strength and mobility and overall decreased pain. Some stiffness into IR and abduction. Patient with good understanding of additional exercises and rationale for compliance to HEP at least once a day.  Keena is progressing well towards her goals.   Pt prognosis is Good.     Pt will continue to benefit from skilled outpatient physical therapy to address the deficits listed in the problem list box on initial evaluation, provide pt/family education and to maximize pt's level of independence in the home and community environment.     Pt's spiritual, cultural and educational needs considered and pt agreeable to plan of care and goals.    Anticipated barriers to physical therapy: none    Goals:   Short Term Goals:  4-8 weeks  1.Report decreased    R shoulder    pain  <   / =  2  /10  to increase tolerance for ADLs  2. Increased R shoulder abduction AROM by 5 deg without pain for increased ease reaching overhead  3. Increased R UE strength by 1/3 muscle grade in all deficient planes to increase tolerance for ADL and work activities.  4. Pt to report ability to lie on R side without R shoulder pain for increased ease sleeping  5. Pt to tolerate HEP to improve ROM and independence with ADL's  6. Increased R shoulder IR AROM by 1 spinal level for increased ease donning bras.    Plan     Plan to continue with established POC toward current PT goals.    Katiuska Rodriguez, PTA

## 2019-01-30 ENCOUNTER — CLINICAL SUPPORT (OUTPATIENT)
Dept: REHABILITATION | Facility: HOSPITAL | Age: 64
End: 2019-01-30
Payer: COMMERCIAL

## 2019-01-30 DIAGNOSIS — M25.511 RIGHT SHOULDER PAIN, UNSPECIFIED CHRONICITY: ICD-10-CM

## 2019-01-30 DIAGNOSIS — M62.81 MUSCLE WEAKNESS: ICD-10-CM

## 2019-01-30 PROCEDURE — 97110 THERAPEUTIC EXERCISES: CPT | Mod: PN

## 2019-01-30 PROCEDURE — 97140 MANUAL THERAPY 1/> REGIONS: CPT | Mod: PN

## 2019-01-30 NOTE — PROGRESS NOTES
"  Physical Therapy Daily Treatment Note     Name: Keena Banks  Clinic Number: [id number]    Therapy Diagnosis:   Encounter Diagnoses   Name Primary?    Right shoulder pain, unspecified chronicity     Muscle weakness      Physician: Maryellen Jackson FNP    Visit Date: 1/30/2019  Physician Orders: PT eval and treat  Medical Diagnosis: R shoulder pain  Evaluation Date: 1/23/19  Authorization Period Expiration: 1/16/20  Plan of Care Certification Period: 3/20/19  Visit #/Visits authorized: 3 / 30     Time In: 11:08  Time Out: 12:00  Total Billable Time: 50 minutes    Precautions: Standard    Subjective     Pt reports: continued symptoms into the R shoulder, but still feels she is hiking her shoulder when raising her arm. No current symptoms of pain, just tightness.  She was not as compliant with home exercise program, only did them once a day.  Response to previous treatment: felt better  Functional change: improved tolerance to daily activities    Pain: 0/10  Location: right shoulder      Objective     Keena received therapeutic exercises to develop strength, endurance, ROM and flexibility for 40 minutes including:    PROM R shoulder  AA sh flexion in supine with 2# dowel 10x5 sec  Supine R shoulder flexion 10 x 3"  SL sleeper stretch 3x20"  SL scap depression 10x5 sec  SL sh ER 2x10  SL sh abd 2x10  SL sh flex 2x10  SL sh horiz abd x 10  SL circles at 90 deg abd, cw/ccw x 20 each  Bugger's 2x10, 3 sec hold  Wall slides flexion and abduction x 10 each    Keena received the following manual therapy techniques: Joint mobilizations were applied to the: R GH joint for 10 minutes, including:  AP and inferior glides gr I and II, gentle oscillations at end ranges all planes        Home Exercises Provided and Patient Education Provided     Education provided:   - slowly re-introduce activities into daily life to avoid over-straining and onset of pain into the R shoulder.  - emphasis on performing HEP at least once a " day    Written Home Exercises Provided: Patient to continue with current written HEP  Exercises were reviewed and Keena was able to demonstrate them prior to the end of the session.  Keena demonstrated good  understanding of the education provided.     Assessment     Keena demonstrated continued improvement of tolerance to treatment this date. She presents with greater scapular and GH mobility however demonstrated weakness and fatigue with sidelying shoulder circles, backwards more difficult to control. Decreased GH stiffness noted with wall slides and no stiffness reported as long as pt activates scapula with AROM. Patient with good understanding of additional exercises and rationale for compliance to HEP at least once a day.  Keena is progressing well towards her goals.   Pt prognosis is Good.     Pt will continue to benefit from skilled outpatient physical therapy to address the deficits listed in the problem list box on initial evaluation, provide pt/family education and to maximize pt's level of independence in the home and community environment.     Pt's spiritual, cultural and educational needs considered and pt agreeable to plan of care and goals.    Anticipated barriers to physical therapy: none    Goals:   Short Term Goals:  4-8 weeks  1.Report decreased    R shoulder    pain  <   / =  2  /10  to increase tolerance for ADLs  2. Increased R shoulder abduction AROM by 5 deg without pain for increased ease reaching overhead  3. Increased R UE strength by 1/3 muscle grade in all deficient planes to increase tolerance for ADL and work activities.  4. Pt to report ability to lie on R side without R shoulder pain for increased ease sleeping  5. Pt to tolerate HEP to improve ROM and independence with ADL's  6. Increased R shoulder IR AROM by 1 spinal level for increased ease donning bras.    Plan     Plan to continue with established POC toward current PT goals.    Katiuska Rodriguez, PTA

## 2019-02-04 ENCOUNTER — CLINICAL SUPPORT (OUTPATIENT)
Dept: REHABILITATION | Facility: HOSPITAL | Age: 64
End: 2019-02-04
Payer: COMMERCIAL

## 2019-02-04 DIAGNOSIS — M25.511 RIGHT SHOULDER PAIN, UNSPECIFIED CHRONICITY: ICD-10-CM

## 2019-02-04 DIAGNOSIS — M62.81 MUSCLE WEAKNESS: ICD-10-CM

## 2019-02-04 PROCEDURE — 97110 THERAPEUTIC EXERCISES: CPT | Mod: PN

## 2019-02-04 NOTE — PROGRESS NOTES
"  Physical Therapy Daily Treatment Note     Name: Keena Banks  Clinic Number: [id number]    Therapy Diagnosis:   Encounter Diagnoses   Name Primary?    Right shoulder pain, unspecified chronicity     Muscle weakness      Physician: Maryellen Jackson FNP    Visit Date: 2/4/2019  Physician Orders: PT eval and treat  Medical Diagnosis: R shoulder pain  Evaluation Date: 1/23/19  Authorization Period Expiration: 1/16/20  Plan of Care Certification Period: 3/20/19  Visit #/Visits authorized: 4 / 30     Time In: 11:05  Time Out: 12:00  Total Billable Time: 55 minutes    Precautions: Standard    Subjective     Pt reports: continued improvement of the symptoms to the shoulder. States she is still avoiding lifting heavier things with her R arm. States her functional activities with grooming and dressing as well as cooking and light cleaning. States overall her arm just sometimes feels a little unsteady  She was not as compliant with home exercise program, only did them once a day.  Response to previous treatment: felt better  Functional change: improved tolerance to daily activities    Pain: 0/10  Location: right shoulder      Objective     Keena received therapeutic exercises to develop strength, endurance, ROM and flexibility for 55 minutes including:    PROM R shoulder  (NP) AA sh flexion in supine with 2# dowel 10x5 sec  Supine R shoulder flexion 10 x 3"  SL sleeper stretch 3x20"  SL scap depression 10x5 sec  SL sh ER 2x10 1#  SL sh abd 2x10 1#  SL sh flex 2x10  SL sh horiz abd x 10  SL circles at 90 deg abd, cw/ccw x 20 each 1#   Prone R sh ext with ER x 10  Prone R sh horiz abd x 10  Brugger's 2x10, 3 sec hold YTB  A->Z on wall above shoulder height  (NP)Wall slides flexion and abduction x 10 each    Possible for next session: Wall taps end range scaption 10 x 5 sec    Keena received the following manual therapy techniques: Joint mobilizations were applied to the: R GH joint for 0 minutes, including:  AP and inferior glides " gr I and II, gentle oscillations at end ranges all planes      Home Exercises Provided and Patient Education Provided     Education provided:   - slowly re-introduce activities into daily life to avoid over-straining and onset of pain into the R shoulder.  - emphasis on performing HEP at least once a day    Written Home Exercises Provided: Patient to continue with current written HEP. Given YTB for use at home with Brugger's  Exercises were reviewed and Keena was able to demonstrate them prior to the end of the session.  Keena demonstrated good  understanding of the education provided.     Assessment     Keena presents this date with overall improvement of symptoms with greater GH mobility. No reports of pain or discomfort into the R shoulder throughout session. She continues with impaired periscapular stability with sidelying exercises for scapular stabilization. Able to perform A->Z on wall without onset of fatigue.  Keena is progressing well towards her goals.   Pt prognosis is Good.     Pt will continue to benefit from skilled outpatient physical therapy to address the deficits listed in the problem list box on initial evaluation, provide pt/family education and to maximize pt's level of independence in the home and community environment.     Pt's spiritual, cultural and educational needs considered and pt agreeable to plan of care and goals.    Anticipated barriers to physical therapy: none    Goals:   Short Term Goals:  4-8 weeks  1.Report decreased    R shoulder    pain  <   / =  2  /10  to increase tolerance for ADLs  2. Increased R shoulder abduction AROM by 5 deg without pain for increased ease reaching overhead  3. Increased R UE strength by 1/3 muscle grade in all deficient planes to increase tolerance for ADL and work activities.  4. Pt to report ability to lie on R side without R shoulder pain for increased ease sleeping  5. Pt to tolerate HEP to improve ROM and independence with ADL's  6. Increased R shoulder  IR AROM by 1 spinal level for increased ease donning bras.    Plan     Plan to continue with established POC toward current PT goals.    Katiuska Rodriguez, PTA

## 2019-02-06 ENCOUNTER — CLINICAL SUPPORT (OUTPATIENT)
Dept: REHABILITATION | Facility: HOSPITAL | Age: 64
End: 2019-02-06
Payer: COMMERCIAL

## 2019-02-06 DIAGNOSIS — M25.511 RIGHT SHOULDER PAIN, UNSPECIFIED CHRONICITY: ICD-10-CM

## 2019-02-06 DIAGNOSIS — M62.81 MUSCLE WEAKNESS: ICD-10-CM

## 2019-02-06 PROCEDURE — 97110 THERAPEUTIC EXERCISES: CPT | Mod: PN

## 2019-02-06 NOTE — PROGRESS NOTES
"  Physical Therapy Daily Treatment Note     Name: Keena Banks  Clinic Number: [id number]    Therapy Diagnosis:   Encounter Diagnoses   Name Primary?    Right shoulder pain, unspecified chronicity     Muscle weakness      Physician: Maryellen Jackson FNP    Visit Date: 2/6/2019  Physician Orders: PT eval and treat  Medical Diagnosis: R shoulder pain  Evaluation Date: 1/23/19  Authorization Period Expiration: 1/16/20  Plan of Care Certification Period: 3/20/19  Visit #/Visits authorized: 5 / 30     Time In: 11:06  Time Out: 11:58  Total Billable Time: 50 minutes    Precautions: Standard    Subjective     Pt reports: having some R UT pain yesterday, but may have slept funny on her neck. Overall better today, just a little soreness and tightness in the same area. States the shoulder feels fine.   She was not as compliant with home exercise program, only did them once a day.  Response to previous treatment: felt better  Functional change: improved tolerance to daily activities    Pain: 0/10  Location: right shoulder      Objective     Keena received therapeutic exercises to develop strength, endurance, ROM and flexibility for 50 minutes including:    PROM R shoulder  Strain-counterstrain for R UT  Supine R shoulder flexion 10 x 3"  SL sleeper stretch 3x20"  SL scap depression 10x5 sec  SL sh ER 2x10 1#  SL sh abd 2x10 1#  SL sh flex 2x10 1#  (NP) SL sh horiz abd x 10  SL circles at 90 deg abd, cw/ccw x 30 each 1#   Prone R sh ext with ER 2 x 10  Prone R sh horiz abd 2 x 10  Brugger's 2x10, 3 sec hold YTB  A->Z on wall above shoulder height 1x  Wall taps end range scaption B 10 x 5 sec    Possible for next session:  circles cw/ccw with s-ball on wall in flexion and abduction    Keena received the following manual therapy techniques: Joint mobilizations were applied to the: R GH joint for 0 minutes, including:  AP and inferior glides gr I and II, gentle oscillations at end ranges all planes      Home Exercises Provided and " Patient Education Provided     Education provided:   - slowly re-introduce activities into daily life to avoid over-straining and onset of pain into the R shoulder.  - emphasis on performing HEP at least once a day    Written Home Exercises Provided: Patient to continue with current written HEP. Given YTB for use at home with Brugger's  Exercises were reviewed and Keena was able to demonstrate them prior to the end of the session.  Keena demonstrated good  understanding of the education provided.     Assessment     Keena tolerated treatment well overall this date without exacerbation of symptoms. Improving scapular stability with exercises. Appropriate muscle fatigue noted with all exercises this date, especially additional wall taps in scaption. Pt continues with GH mobility and ROM within normal limits, slight tightness into internal rotation.  Keena is progressing well towards her goals.   Pt prognosis is Good.     Pt will continue to benefit from skilled outpatient physical therapy to address the deficits listed in the problem list box on initial evaluation, provide pt/family education and to maximize pt's level of independence in the home and community environment.     Pt's spiritual, cultural and educational needs considered and pt agreeable to plan of care and goals.    Anticipated barriers to physical therapy: none    Goals:   Short Term Goals:  4-8 weeks  1.Report decreased    R shoulder    pain  <   / =  2  /10  to increase tolerance for ADLs  2. Increased R shoulder abduction AROM by 5 deg without pain for increased ease reaching overhead  3. Increased R UE strength by 1/3 muscle grade in all deficient planes to increase tolerance for ADL and work activities.  4. Pt to report ability to lie on R side without R shoulder pain for increased ease sleeping  5. Pt to tolerate HEP to improve ROM and independence with ADL's  6. Increased R shoulder IR AROM by 1 spinal level for increased ease donning bras.    Plan      Plan to continue with established POC toward current PT goals.    Katiuska Rodriguez, PTA

## 2019-02-11 ENCOUNTER — CLINICAL SUPPORT (OUTPATIENT)
Dept: REHABILITATION | Facility: HOSPITAL | Age: 64
End: 2019-02-11
Payer: COMMERCIAL

## 2019-02-11 DIAGNOSIS — M25.511 RIGHT SHOULDER PAIN, UNSPECIFIED CHRONICITY: ICD-10-CM

## 2019-02-11 DIAGNOSIS — M62.81 MUSCLE WEAKNESS: ICD-10-CM

## 2019-02-11 PROCEDURE — 97110 THERAPEUTIC EXERCISES: CPT | Mod: PN

## 2019-02-11 NOTE — PROGRESS NOTES
"  Physical Therapy Daily Treatment Note     Name: Keena Banks  Clinic Number: [id number]    Therapy Diagnosis:   Encounter Diagnoses   Name Primary?    Right shoulder pain, unspecified chronicity     Muscle weakness      Physician: Maryellen Jackson FNP    Visit Date: 2/11/2019  Physician Orders: PT eval and treat  Medical Diagnosis: R shoulder pain  Evaluation Date: 1/23/19  Authorization Period Expiration: 1/16/20  Plan of Care Certification Period: 3/20/19  Visit #/Visits authorized: 6 / 30     Time In: 11:05  Time Out: 12:02  Total Billable Time: 55 minutes    Precautions: Standard    Subjective     Pt reports: some irritation to the low back from lifting and moving a wooden bench. States the R shoulder had no difficulty over the weekend while doing some outdoor yard work.   She was not as compliant with home exercise program, only did them once a day.  Response to previous treatment: felt better  Functional change: improved tolerance to daily activities    Pain: 0/10  Location: right shoulder      Objective     Keena received therapeutic exercises to develop strength, endurance, ROM and flexibility for 55 minutes including:    PROM R shoulder- WNL this date  (NP) Strain-counterstrain for R UT  Supine R shoulder flexion 1# 10 x 3"  SL sleeper stretch 3x20"  (NP) SL scap depression 10x5 sec  SL sh ER 3x10 1#  SL sh abd 2x10 1#  SL sh flex 2x10 1#  (NP) SL sh horiz abd x 10  SL circles at 90 deg abd, cw/ccw x 30 each 1#   Prone R sh ext with ER 2 x 10 1#  Prone R sh horiz abd with ER 1 x 10  Brugger's 2x10, 3 sec hold RTB  A->Z on wall above shoulder height 1x  Wall taps end range scaption B 10 x 5 sec  Wall push up + x 10 (cues for scap protract/retract)    Possible for next session:  circles cw/ccw with s-ball on wall in flexion and abduction    Keena received the following manual therapy techniques: Joint mobilizations were applied to the: R GH joint for 0 minutes, including:  AP and inferior glides gr I and II, " gentle oscillations at end ranges all planes      Home Exercises Provided and Patient Education Provided     Education provided:   - slowly re-introduce activities into daily life to avoid over-straining and onset of pain into the R shoulder.  - emphasis on performing HEP at least once a day    Written Home Exercises Provided: Patient to continue with current written HEP. Given YTB for use at home with Brugger's  Exercises were reviewed and Keena was able to demonstrate them prior to the end of the session.  Keena demonstrated good  understanding of the education provided.     Assessment     Keena demonstrated good tolerance to treatment this date with improved mobility and strength. R shoulder AROM is within normal limits. Greater scapular stability with SL shoulder circles. Appropriate muscle fatigue noted with all exercises in prone, SL and standing. Pt will benefit from continued strengthening.  Keena is progressing well towards her goals.   Pt prognosis is Good.     Pt will continue to benefit from skilled outpatient physical therapy to address the deficits listed in the problem list box on initial evaluation, provide pt/family education and to maximize pt's level of independence in the home and community environment.     Pt's spiritual, cultural and educational needs considered and pt agreeable to plan of care and goals.    Anticipated barriers to physical therapy: none    Goals:   Short Term Goals:  4-8 weeks  1.Report decreased    R shoulder    pain  <   / =  2  /10  to increase tolerance for ADLs  2. Increased R shoulder abduction AROM by 5 deg without pain for increased ease reaching overhead  3. Increased R UE strength by 1/3 muscle grade in all deficient planes to increase tolerance for ADL and work activities.  4. Pt to report ability to lie on R side without R shoulder pain for increased ease sleeping  5. Pt to tolerate HEP to improve ROM and independence with ADL's  6. Increased R shoulder IR AROM by 1 spinal  level for increased ease donning bras.    Plan     Plan to continue with established POC toward current PT goals.    Katiuska Rodriguez, PTA

## 2019-02-13 ENCOUNTER — CLINICAL SUPPORT (OUTPATIENT)
Dept: REHABILITATION | Facility: HOSPITAL | Age: 64
End: 2019-02-13
Payer: COMMERCIAL

## 2019-02-13 DIAGNOSIS — M62.81 MUSCLE WEAKNESS: ICD-10-CM

## 2019-02-13 DIAGNOSIS — M25.511 RIGHT SHOULDER PAIN, UNSPECIFIED CHRONICITY: ICD-10-CM

## 2019-02-13 PROCEDURE — 97110 THERAPEUTIC EXERCISES: CPT | Mod: PN

## 2019-02-13 NOTE — PROGRESS NOTES
"  Physical Therapy Daily Treatment Note     Name: Keena Banks  Clinic Number: [id number]    Therapy Diagnosis:   Encounter Diagnoses   Name Primary?    Right shoulder pain, unspecified chronicity     Muscle weakness      Physician: Maryellen Jackson FNP    Visit Date: 2/13/2019  Physician Orders: PT eval and treat  Medical Diagnosis: R shoulder pain  Evaluation Date: 1/23/19  Authorization Period Expiration: 1/16/20  Plan of Care Certification Period: 3/20/19  Visit #/Visits authorized: 7 / 30     Time In: 11:15 (late arrival)  Time Out: 12:00  Total Billable Time: 45 minutes    Precautions: Standard    Subjective     Pt reports: states she is doing well and not having pain to R shoulder  She was compliant with home exercise program, only did them once a day.  Response to previous treatment: felt better  Functional change: improved tolerance to daily activities    Pain: 0/10  Location: right shoulder      Objective        Active Range of Motion:   Shoulder Left Right   Flexion 155 168   Abduction 155 165   ER at 0 40 50   ER at 90 95 100   IR T5 T9     Upper Extremity Strength  (R) UE  (L) UE    Shoulder flexion: 5/5 Shoulder flexion: 5/5   Shoulder Abduction: 5/5  Shoulder abduction: 5/5   Shoulder ER 5/5 Shoulder ER 5/5   Shoulder IR 5/5 Shoulder IR 5/5   Lower Trap 3+/5 Lower Trap 4+/5   Middle Trap 4+/5 Middle Trap 4+/5     Empty can: (-)      Keena received therapeutic exercises to develop strength, endurance, ROM and flexibility for 55 minutes including:  PROM R shoulder- WNL this date  NP Supine R shoulder flexion 1# 10 x 3"  SL sh ER 2x10 2#  SL sh abd 2x10 2#  SL sh flex 2x10 2#  SL circles at 90 deg abd, cw/ccw x 30 each 1#   Prone R sh ext with ER 2 x 10 1#  Prone R sh horiz abd 2 x 10  Brugger's 2x10, 3 sec hold RTB  A->Z on wall above shoulder height 1x  NP Wall taps end range scaption B 10 x 5 sec  Wall push up + 2x 10 (cues for scap protract/retract)    Possible for next session:  circles cw/ccw with " s-ball on wall in flexion and abduction    Home Exercises Provided and Patient Education Provided     Education provided:   - slowly re-introduce activities into daily life to avoid over-straining and onset of pain into the R shoulder.  - emphasis on performing HEP at least once a day    Written Home Exercises Provided: Patient to continue with current written HEP. Exercises were reviewed and Keena was able to demonstrate them prior to the end of the session.  Keena demonstrated good  understanding of the education provided.     Assessment     Keena tolerated tx session well reporting 0/10 pain to R shoulder post tx.  She has significantly improved with increased R shoulder ROM and strength.  She still has some weakness to lower traps, but continues to improve with strengthening exercises.  She is to follow up with Dr. Bishop today.  Keena is progressing well towards her goals.   Pt prognosis is Good.     Pt will continue to benefit from skilled outpatient physical therapy to address the deficits listed in the problem list box on initial evaluation, provide pt/family education and to maximize pt's level of independence in the home and community environment.     Pt's spiritual, cultural and educational needs considered and pt agreeable to plan of care and goals.    Anticipated barriers to physical therapy: none    Goals:   Short Term Goals:  4-8 weeks  1.Report decreased    R shoulder    pain  <   / =  2  /10  to increase tolerance for ADLs (met)  2. Increased R shoulder abduction AROM by 5 deg without pain for increased ease reaching overhead (met)  3. Increased R UE strength by 1/3 muscle grade in all deficient planes to increase tolerance for ADL and work activities. (partially met)  4. Pt to report ability to lie on R side without R shoulder pain for increased ease sleeping (met)  5. Pt to tolerate HEP to improve ROM and independence with ADL's (met)  6. Increased R shoulder IR AROM by 1 spinal level for increased ease  satish salazar. (progressing)    Unmet goals continue to remain appropriate within 2 weeks.    Plan     Plan to continue with established POC toward current PT goals.  Weaned pt down to 1 visit per week.    Pat Chou, PT

## 2019-02-18 ENCOUNTER — CLINICAL SUPPORT (OUTPATIENT)
Dept: REHABILITATION | Facility: HOSPITAL | Age: 64
End: 2019-02-18
Payer: COMMERCIAL

## 2019-02-18 DIAGNOSIS — M25.511 RIGHT SHOULDER PAIN, UNSPECIFIED CHRONICITY: ICD-10-CM

## 2019-02-18 DIAGNOSIS — M62.81 MUSCLE WEAKNESS: ICD-10-CM

## 2019-02-18 PROCEDURE — 97110 THERAPEUTIC EXERCISES: CPT | Mod: PN

## 2019-02-18 NOTE — PROGRESS NOTES
"  Physical Therapy Daily Treatment Note     Name: Keena Banks  Clinic Number: [id number]    Therapy Diagnosis:   Encounter Diagnoses   Name Primary?    Right shoulder pain, unspecified chronicity     Muscle weakness      Physician: Maryellen Jackson FNP    Visit Date: 2/18/2019  Physician Orders: PT eval and treat  Medical Diagnosis: R shoulder pain  Evaluation Date: 1/23/19  Authorization Period Expiration: 1/16/20  Plan of Care Certification Period: 3/20/19  Visit #/Visits authorized: 8 / 30     Time In: 9:50  Time Out: 10:45  Total Billable Time: 55 minutes    Precautions: Standard    Subjective     Pt reports: states she is doing well and not having pain to R shoulder  She was compliant with home exercise program.  Response to previous treatment: felt good  Functional change: improved tolerance to daily activities.  Worked in yard this weekend without pain.    Pain: 0/10  Location: right shoulder      Objective       Keena received therapeutic exercises to develop strength, endurance, ROM and flexibility for 55 minutes including:  PROM R shoulder- WNL this date  NP Supine R shoulder flexion 1# 10 x 3"  SL sh ER 2x10 2#  SL circles at 90 deg abd, cw/ccw x 30 each 1#   Prone R sh ext with ER 2 x 12 1#  Prone R sh horiz abd 2 x 10 1#  Brugger's 2x10, 3 sec hold RTB  A->Z on wall above shoulder height 1x  Wall taps end range scaption B  2x30 sec  W rows 2x10 YTB  Standing sh flex and abd 1# wrist weights x10 ea (full ROM)  Wall push up + 2x 10 (cues for scap protract/retract)      Home Exercises Provided and Patient Education Provided     Education provided:   - slowly re-introduce activities into daily life to avoid over-straining and onset of pain into the R shoulder.  - emphasis on performing HEP at least once a day    Written Home Exercises Provided: Patient to continue with current written HEP. Exercises were reviewed and Keena was able to demonstrate them prior to the end of the session.  Keena demonstrated good  " understanding of the education provided.     Assessment     Keena tolerated tx session well reporting 0/10 pain to R shoulder post tx.  She demonstrated good scapula mechanics for standing shoulder flexion and abduction.  Keena is progressing well towards her goals.   Pt prognosis is Good.     Pt will continue to benefit from skilled outpatient physical therapy to address the deficits listed in the problem list box on initial evaluation, provide pt/family education and to maximize pt's level of independence in the home and community environment.     Pt's spiritual, cultural and educational needs considered and pt agreeable to plan of care and goals.    Anticipated barriers to physical therapy: none    Goals:   Short Term Goals:  4-8 weeks  1.Report decreased    R shoulder    pain  <   / =  2  /10  to increase tolerance for ADLs (met)  2. Increased R shoulder abduction AROM by 5 deg without pain for increased ease reaching overhead (met)  3. Increased R UE strength by 1/3 muscle grade in all deficient planes to increase tolerance for ADL and work activities. (partially met)  4. Pt to report ability to lie on R side without R shoulder pain for increased ease sleeping (met)  5. Pt to tolerate HEP to improve ROM and independence with ADL's (met)  6. Increased R shoulder IR AROM by 1 spinal level for increased ease donning bras. (progressing)    Unmet goals continue to remain appropriate within 2 weeks.    Plan     Plan to continue with established POC toward current PT goals.  Weaned pt down to 1 visit per week.    Pat Chou, PT

## 2019-02-25 ENCOUNTER — CLINICAL SUPPORT (OUTPATIENT)
Dept: REHABILITATION | Facility: HOSPITAL | Age: 64
End: 2019-02-25
Payer: COMMERCIAL

## 2019-02-25 DIAGNOSIS — M62.81 MUSCLE WEAKNESS: ICD-10-CM

## 2019-02-25 DIAGNOSIS — M25.511 RIGHT SHOULDER PAIN, UNSPECIFIED CHRONICITY: ICD-10-CM

## 2019-02-25 PROCEDURE — 97110 THERAPEUTIC EXERCISES: CPT | Mod: PN

## 2019-02-25 NOTE — PROGRESS NOTES
"  Physical Therapy Daily Treatment Note     Name: Keena Banks  Clinic Number: [id number]    Therapy Diagnosis:   Encounter Diagnoses   Name Primary?    Right shoulder pain, unspecified chronicity     Muscle weakness      Physician: Maryellen Jackson FNP    Visit Date: 2/25/2019  Physician Orders: PT eval and treat  Medical Diagnosis: R shoulder pain  Evaluation Date: 1/23/19  Authorization Period Expiration: 1/16/20  Plan of Care Certification Period: 3/20/19  Visit #/Visits authorized: 9 / 30     Time In: 9:50  Time Out: 10:45  Total Billable Time: 55 minutes    Precautions: Standard    Subjective     Pt reports: states she is doing well and not having pain to R shoulder  She was compliant with home exercise program.  Response to previous treatment: felt good  Functional change: improved tolerance to daily activities.  Worked in yard this weekend without pain.    Pain: 0/10  Location: right shoulder      Objective   Active Range of Motion:   Shoulder Left Right   Flexion 155 168   Abduction 155 165   ER at 0 40 50   ER at 90 95 100   IR T5 T8     Upper Extremity Strength  (R) UE  (L) UE    Shoulder flexion: 5/5 Shoulder flexion: 5/5   Shoulder Abduction: 5/5  Shoulder abduction: 5/5   Shoulder ER 5/5 Shoulder ER 5/5   Shoulder IR 5/5 Shoulder IR 5/5   Lower Trap 4/5 Lower Trap 4+/5   Middle Trap 4+/5 Middle Trap 4+/5     Empty can: (-)    Keena received therapeutic exercises to develop strength, endurance, ROM and flexibility for 55 minutes including:  PROM R shoulder- WNL this date  NP Supine R shoulder flexion 1# 10 x 3"  SL sh ER 2x10 2#  SL circles at 90 deg abd, cw/ccw x 30 each 1#   Prone R sh ext with ER 2 x 12 1#  Prone R sh horiz abd 2 x 10 1#  Brugger's 2x10, 3 sec hold RTB  A->Z on wall above shoulder height 1x  Wall taps end range scaption B  2x30 sec  W rows 2x10 YTB  Standing sh flex and abd 1# wrist weights x10 ea (full ROM)  Wall push up + 2x 10 (cues for scap protract/retract)      Home Exercises " Provided and Patient Education Provided     Education provided:   - emphasis on performing HEP at least 2x/week.  Pt gave verbal understanding.    Written Home Exercises Provided: Patient to continue with current written HEP upon discharge.  Keena demonstrated good  understanding of the education provided.     Assessment     Keena tolerated tx session well reporting 0/10 pain to R shoulder post tx.  She demonstrated good scapula mechanics for standing shoulder flexion and abduction.  She is safe for discharge to continue to self manage with HeP.        Goals:   Short Term Goals:  4-8 weeks  1.Report decreased    R shoulder    pain  <   / =  2  /10  to increase tolerance for ADLs (met)  2. Increased R shoulder abduction AROM by 5 deg without pain for increased ease reaching overhead (met)  3. Increased R UE strength by 1/3 muscle grade in all deficient planes to increase tolerance for ADL and work activities. (met)  4. Pt to report ability to lie on R side without R shoulder pain for increased ease sleeping (met)  5. Pt to tolerate HEP to improve ROM and independence with ADL's (met)  6. Increased R shoulder IR AROM by 1 spinal level for increased ease donning bras. (met)      Plan     Pt discharged from PT to continue to self manage with HEP.    Pat Chou, PT

## 2019-04-17 ENCOUNTER — TELEPHONE (OUTPATIENT)
Dept: FAMILY MEDICINE | Facility: CLINIC | Age: 64
End: 2019-04-17

## 2019-04-17 DIAGNOSIS — Z12.31 ENCOUNTER FOR SCREENING MAMMOGRAM FOR MALIGNANT NEOPLASM OF BREAST: Primary | ICD-10-CM

## 2019-04-17 NOTE — TELEPHONE ENCOUNTER
----- Message from Christy Ayala sent at 4/17/2019 11:14 AM CDT -----  Contact: Self  Patient is requesting that you order her annual mammo and call her back to let her know at [phone].  Thanks

## 2019-05-08 ENCOUNTER — PATIENT OUTREACH (OUTPATIENT)
Dept: ADMINISTRATIVE | Facility: HOSPITAL | Age: 64
End: 2019-05-08

## 2019-05-08 NOTE — LETTER
May 8, 2019    Keena Banks  [address] [address]             [facility]  [address]  [place] [address]  Phone: [phone] Dear Mrs. Banks:    [facility] is committed to your overall health.  To help you get the most out of each of your visits, we will review your information to make sure you are up to date on all of your recommended tests and/or procedures.      Marisela Lockwood DO  has found that your chart shows you may be due for the following:    Health Maintenance Due   Topic    TETANUS VACCINE     Mammogram     Lipid Panel        If you have had any of the above done at another facility, please bring the records with you or FAX them to [phone] so that your record at [facility] will be complete.  If you have not had any of these tests or procedures done recently and would like to complete this testing, please call [phone] or send a message through your [facility] portal to your provider's office.    If you have an upcoming scheduled appointment for the above test and/or procedures, please disregard this letter.      If you have any questions or concerns, please don't hesitate to call.    Sincerely,        Kandy Elliott MA

## 2019-05-08 NOTE — PROGRESS NOTES
Health Maintenance Due   Topic Date Due    TETANUS VACCINE  07/29/2018    Mammogram  01/04/2019    Lipid Panel  01/04/2019     Chart reviewed/scrubbed on 05/08/2019

## 2019-05-10 ENCOUNTER — TELEPHONE (OUTPATIENT)
Dept: FAMILY MEDICINE | Facility: CLINIC | Age: 64
End: 2019-05-10

## 2019-05-10 NOTE — TELEPHONE ENCOUNTER
No answer, left message with callback number   saw she was a no show to her mammogram appt, calling to reschedule.

## 2019-05-22 ENCOUNTER — OFFICE VISIT (OUTPATIENT)
Dept: FAMILY MEDICINE | Facility: CLINIC | Age: 64
End: 2019-05-22
Payer: MEDICARE

## 2019-05-22 ENCOUNTER — HOSPITAL ENCOUNTER (OUTPATIENT)
Dept: RADIOLOGY | Facility: HOSPITAL | Age: 64
Discharge: HOME | End: 2019-05-22
Attending: INTERNAL MEDICINE
Payer: MEDICARE

## 2019-05-22 VITALS
DIASTOLIC BLOOD PRESSURE: 74 MMHG | WEIGHT: 134.81 LBS | RESPIRATION RATE: 18 BRPM | SYSTOLIC BLOOD PRESSURE: 150 MMHG | HEART RATE: 72 BPM | TEMPERATURE: 98 F | HEIGHT: 63 IN | BODY MASS INDEX: 23.89 KG/M2 | OXYGEN SATURATION: 95 %

## 2019-05-22 DIAGNOSIS — Z78.0 ASYMPTOMATIC MENOPAUSAL STATE: ICD-10-CM

## 2019-05-22 DIAGNOSIS — S49.91XA INJURY OF RIGHT SHOULDER, INITIAL ENCOUNTER: ICD-10-CM

## 2019-05-22 DIAGNOSIS — K76.0 NAFLD (NONALCOHOLIC FATTY LIVER DISEASE): ICD-10-CM

## 2019-05-22 DIAGNOSIS — I10 ESSENTIAL HYPERTENSION: Primary | ICD-10-CM

## 2019-05-22 DIAGNOSIS — Z85.72 HISTORY OF LYMPHOMA: ICD-10-CM

## 2019-05-22 DIAGNOSIS — M19.042 PRIMARY OSTEOARTHRITIS OF BOTH HANDS: ICD-10-CM

## 2019-05-22 DIAGNOSIS — F41.1 GAD (GENERALIZED ANXIETY DISORDER): ICD-10-CM

## 2019-05-22 DIAGNOSIS — E78.5 HYPERLIPIDEMIA, UNSPECIFIED HYPERLIPIDEMIA TYPE: ICD-10-CM

## 2019-05-22 DIAGNOSIS — Z23 NEED FOR DIPHTHERIA-TETANUS-PERTUSSIS (TDAP) VACCINE: ICD-10-CM

## 2019-05-22 DIAGNOSIS — F33.0 MAJOR DEPRESSIVE DISORDER, RECURRENT EPISODE, MILD: ICD-10-CM

## 2019-05-22 DIAGNOSIS — M19.041 PRIMARY OSTEOARTHRITIS OF BOTH HANDS: ICD-10-CM

## 2019-05-22 DIAGNOSIS — G40.909 SEIZURE DISORDER: ICD-10-CM

## 2019-05-22 LAB
ALBUMIN SERPL BCP-MCNC: 4.3 G/DL (ref 3.5–5.2)
ALP SERPL-CCNC: 129 U/L (ref 55–135)
ALT SERPL W/O P-5'-P-CCNC: 21 U/L (ref 10–44)
ANION GAP SERPL CALC-SCNC: 9 MMOL/L (ref 8–16)
AST SERPL-CCNC: 30 U/L (ref 10–40)
BASOPHILS # BLD AUTO: 0.08 K/UL (ref 0–0.2)
BASOPHILS NFR BLD: 1.4 % (ref 0–1.9)
BILIRUB SERPL-MCNC: 0.4 MG/DL (ref 0.1–1)
BUN SERPL-MCNC: 14 MG/DL (ref 8–23)
CALCIUM SERPL-MCNC: 10.5 MG/DL (ref 8.7–10.5)
CHLORIDE SERPL-SCNC: 102 MMOL/L (ref 95–110)
CHOLEST SERPL-MCNC: 183 MG/DL (ref 120–199)
CHOLEST/HDLC SERPL: 1.9 {RATIO} (ref 2–5)
CO2 SERPL-SCNC: 29 MMOL/L (ref 23–29)
CREAT SERPL-MCNC: 0.9 MG/DL (ref 0.5–1.4)
DIFFERENTIAL METHOD: ABNORMAL
EOSINOPHIL # BLD AUTO: 0.2 K/UL (ref 0–0.5)
EOSINOPHIL NFR BLD: 3.8 % (ref 0–8)
ERYTHROCYTE [DISTWIDTH] IN BLOOD BY AUTOMATED COUNT: 12.4 % (ref 11.5–14.5)
EST. GFR  (AFRICAN AMERICAN): >60 ML/MIN/1.73 M^2
EST. GFR  (NON AFRICAN AMERICAN): >60 ML/MIN/1.73 M^2
GLUCOSE SERPL-MCNC: 96 MG/DL (ref 70–110)
HCT VFR BLD AUTO: 34.5 % (ref 37–48.5)
HDLC SERPL-MCNC: 95 MG/DL (ref 40–75)
HDLC SERPL: 51.9 % (ref 20–50)
HGB BLD-MCNC: 11.2 G/DL (ref 12–16)
IMM GRANULOCYTES # BLD AUTO: 0.04 K/UL (ref 0–0.04)
IMM GRANULOCYTES NFR BLD AUTO: 0.7 % (ref 0–0.5)
LDLC SERPL CALC-MCNC: 77 MG/DL (ref 63–159)
LYMPHOCYTES # BLD AUTO: 1.3 K/UL (ref 1–4.8)
LYMPHOCYTES NFR BLD: 22.5 % (ref 18–48)
MCH RBC QN AUTO: 29.9 PG (ref 27–31)
MCHC RBC AUTO-ENTMCNC: 32.5 G/DL (ref 32–36)
MCV RBC AUTO: 92 FL (ref 82–98)
MONOCYTES # BLD AUTO: 0.5 K/UL (ref 0.3–1)
MONOCYTES NFR BLD: 7.8 % (ref 4–15)
NEUTROPHILS # BLD AUTO: 3.7 K/UL (ref 1.8–7.7)
NEUTROPHILS NFR BLD: 63.8 % (ref 38–73)
NONHDLC SERPL-MCNC: 88 MG/DL
NRBC BLD-RTO: 0 /100 WBC
PLATELET # BLD AUTO: 258 K/UL (ref 150–350)
PMV BLD AUTO: 12.6 FL (ref 9.2–12.9)
POTASSIUM SERPL-SCNC: 3.6 MMOL/L (ref 3.5–5.1)
PROT SERPL-MCNC: 7.1 G/DL (ref 6–8.4)
RBC # BLD AUTO: 3.75 M/UL (ref 4–5.4)
SODIUM SERPL-SCNC: 140 MMOL/L (ref 136–145)
TRIGL SERPL-MCNC: 55 MG/DL (ref 30–150)
TSH SERPL DL<=0.005 MIU/L-ACNC: 1.27 UIU/ML (ref 0.4–4)
WBC # BLD AUTO: 5.78 K/UL (ref 3.9–12.7)

## 2019-05-22 PROCEDURE — 3077F SYST BP >= 140 MM HG: CPT | Mod: S$GLB,,, | Performed by: INTERNAL MEDICINE

## 2019-05-22 PROCEDURE — 36415 PR COLLECTION VENOUS BLOOD,VENIPUNCTURE: ICD-10-PCS | Mod: S$GLB,,, | Performed by: INTERNAL MEDICINE

## 2019-05-22 PROCEDURE — 3077F PR MOST RECENT SYSTOLIC BLOOD PRESSURE >= 140 MM HG: ICD-10-PCS | Mod: S$GLB,,, | Performed by: INTERNAL MEDICINE

## 2019-05-22 PROCEDURE — 1100F PR PT FALLS ASSESS DOC 2+ FALLS/FALL W/INJURY/YR: ICD-10-PCS | Mod: S$GLB,,, | Performed by: INTERNAL MEDICINE

## 2019-05-22 PROCEDURE — 36415 COLL VENOUS BLD VENIPUNCTURE: CPT | Mod: S$GLB,,, | Performed by: INTERNAL MEDICINE

## 2019-05-22 PROCEDURE — 3078F DIAST BP <80 MM HG: CPT | Mod: S$GLB,,, | Performed by: INTERNAL MEDICINE

## 2019-05-22 PROCEDURE — 77067 SCR MAMMO BI INCL CAD: CPT | Mod: TC,PO

## 2019-05-22 PROCEDURE — 77063 BREAST TOMOSYNTHESIS BI: CPT | Mod: 26,,, | Performed by: RADIOLOGY

## 2019-05-22 PROCEDURE — 84443 ASSAY THYROID STIM HORMONE: CPT

## 2019-05-22 PROCEDURE — 80053 COMPREHEN METABOLIC PANEL: CPT

## 2019-05-22 PROCEDURE — 3288F PR FALLS RISK ASSESSMENT DOCUMENTED: ICD-10-PCS | Mod: S$GLB,,, | Performed by: INTERNAL MEDICINE

## 2019-05-22 PROCEDURE — 77067 SCR MAMMO BI INCL CAD: CPT | Mod: 26,,, | Performed by: RADIOLOGY

## 2019-05-22 PROCEDURE — 99214 PR OFFICE/OUTPT VISIT, EST, LEVL IV, 30-39 MIN: ICD-10-PCS | Mod: S$GLB,,, | Performed by: INTERNAL MEDICINE

## 2019-05-22 PROCEDURE — 1100F PTFALLS ASSESS-DOCD GE2>/YR: CPT | Mod: S$GLB,,, | Performed by: INTERNAL MEDICINE

## 2019-05-22 PROCEDURE — 85025 COMPLETE CBC W/AUTO DIFF WBC: CPT

## 2019-05-22 PROCEDURE — 3288F FALL RISK ASSESSMENT DOCD: CPT | Mod: S$GLB,,, | Performed by: INTERNAL MEDICINE

## 2019-05-22 PROCEDURE — 80061 LIPID PANEL: CPT

## 2019-05-22 PROCEDURE — 3008F BODY MASS INDEX DOCD: CPT | Mod: S$GLB,,, | Performed by: INTERNAL MEDICINE

## 2019-05-22 PROCEDURE — 77063 MAMMO DIGITAL SCREENING BILAT WITH TOMOSYNTHESIS_CAD: ICD-10-PCS | Mod: 26,,, | Performed by: RADIOLOGY

## 2019-05-22 PROCEDURE — 3078F PR MOST RECENT DIASTOLIC BLOOD PRESSURE < 80 MM HG: ICD-10-PCS | Mod: S$GLB,,, | Performed by: INTERNAL MEDICINE

## 2019-05-22 PROCEDURE — 77067 MAMMO DIGITAL SCREENING BILAT WITH TOMOSYNTHESIS_CAD: ICD-10-PCS | Mod: 26,,, | Performed by: RADIOLOGY

## 2019-05-22 PROCEDURE — 99214 OFFICE O/P EST MOD 30 MIN: CPT | Mod: S$GLB,,, | Performed by: INTERNAL MEDICINE

## 2019-05-22 PROCEDURE — 3008F PR BODY MASS INDEX (BMI) DOCUMENTED: ICD-10-PCS | Mod: S$GLB,,, | Performed by: INTERNAL MEDICINE

## 2019-05-22 RX ORDER — VENLAFAXINE HYDROCHLORIDE 75 MG/1
75 CAPSULE, EXTENDED RELEASE ORAL DAILY
Qty: 90 CAPSULE | Refills: 3 | Status: SHIPPED | OUTPATIENT
Start: 2019-05-22 | End: 2019-07-23 | Stop reason: SDUPTHER

## 2019-05-22 RX ORDER — MELOXICAM 7.5 MG/1
7.5 TABLET ORAL DAILY
Qty: 30 TABLET | Refills: 6 | Status: SHIPPED | OUTPATIENT
Start: 2019-05-22 | End: 2019-06-05 | Stop reason: DRUGHIGH

## 2019-05-22 RX ORDER — LOSARTAN POTASSIUM 25 MG/1
25 TABLET ORAL DAILY
Qty: 90 TABLET | Refills: 3 | Status: SHIPPED | OUTPATIENT
Start: 2019-05-22 | End: 2019-06-05

## 2019-05-22 RX ORDER — TIZANIDINE 4 MG/1
4 TABLET ORAL NIGHTLY PRN
Qty: 20 TABLET | Refills: 1 | Status: SHIPPED | OUTPATIENT
Start: 2019-05-22 | End: 2019-06-01

## 2019-05-22 NOTE — PROGRESS NOTES
Venipuncture performed with 21 gauge butterfly, x's 1 attempt,  to R Antecubital vein.  Specimens collected per orders.      Pressure dressing applied to site, instructed patient to remove dressing in 10-15 minutes, OK to re-adjust dressing if pressure causing any discomfort, to observe closely for numbness and/or discoloration to hand or fingers, and to notify provider if bleeding persists after applying constant pressure lasting 30 minutes.

## 2019-05-22 NOTE — PROGRESS NOTES
Subjective:       Patient ID: Keena Banks is a 64 y.o. female.    Medication List with Changes/Refills   New Medications    DIPHTH,PERTUS,ACELL,,TETANUS (BOOSTRIX TDAP) 2.5-8-5 LF-MCG-LF/0.5ML SUSP    Inject 0.5 mLs into the muscle once. for 1 dose    LOSARTAN (COZAAR) 25 MG TABLET    Take 1 tablet (25 mg total) by mouth once daily.    MELOXICAM (MOBIC) 7.5 MG TABLET    Take 1 tablet (7.5 mg total) by mouth once daily.    TIZANIDINE (ZANAFLEX) 4 MG TABLET    Take 1 tablet (4 mg total) by mouth nightly as needed.    VENLAFAXINE (EFFEXOR-XR) 75 MG 24 HR CAPSULE    Take 1 capsule (75 mg total) by mouth once daily. To take in addition to the 150 mg dose for a total of 225 mg qday   Current Medications    FLAXSEED OIL 1,030 MG CAP    Take 1 capsule by mouth once daily. Every morning    FLUTICASONE (FLONASE) 50 MCG/ACTUATION NASAL SPRAY    2 sprays by Each Nare route once daily.    FOLIC ACID (FOLVITE) 1 MG TABLET    Take 1 mg by mouth once daily.      HYDROCHLOROTHIAZIDE (HYDRODIURIL) 25 MG TABLET    TAKE 1 TABLET BY MOUTH EVERY DAY    IBUPROFEN (ADVIL,MOTRIN) 200 MG TABLET    Take 400 mg by mouth every 6 (six) hours as needed for Pain.    LAMOTRIGINE XR (LAMICTAL XR) 300 MG XR TABLET    Take 1 tablet (300 mg total) by mouth once daily.    MULTIVITAMIN WITH MINERALS TABLET    Take 1 tablet by mouth once daily.      ROSUVASTATIN (CRESTOR) 20 MG TABLET    TAKE 1 TABLET BY MOUTH ONCE DAILY    VENLAFAXINE (EFFEXOR-XR) 150 MG CP24    TAKE 1 CAPSULE (150 MG TOTAL) BY MOUTH ONCE DAILY.       Chief Complaint: Follow-up (6 month follow up )  She is here today to f/u on her chronic medical issues.      She has hypertension and is taking HCTZ 25 mg qday. She says her home BP reading are running with SBP in the 140s. She has no known CAD. She denies chest pain or shortness of breath.      She has hyperlipidemia controlled on crestor 20 mg qday. Her last lipids were done on 1/2018 were 177/57/90/75.  She is taking aspirin daily.       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 She is taking lamictal  mg qday. She is doing very well. She has not had a seizure for over 5 years. She does say the lamictal affects her memory but no other side effects. She follows with neurology annually and was last seen on 6/2018.  She had neuropsych testing on 7/2018 which showed no memory impairment but mild depression. She is due to f/u this summer.        She had an ultrasound showing fatty infiltration of her liver. She has normal LFTs.       She had depression with anxiety and is taking venlafaxine 150 mg qday.  She feels increased anxiety with some depression despite taking her medication. She is taking care of her granddaughter who is troubled due to the loss of her mother last year.  She has some depression with the loss of her daughter. No suicidal ideations. No sleep issues.       She has a history of NHL(stage III A diffuse large B cell lymphoma) s/p 8 cycles of chemotherapy with CHOP/rituxan. She was  seen by oncology annual and her last appt was on 8/2018.  She continues to be in remission and will f/u in one year.    She reports recurrent dental abscesses and recently had all her teeth pulled and implants placed. She tolerated well but she is needing to learn how to talk and chew with her new teeth.     She reports falling out of bed last pm and landing on her right shoulder. She is having both movement and resting pain in her right shoulder. Worse with certain movement.  No swelling.  She is able to lift and move in all directions.  Nothing makes better.       She lives with her  and granddaughter.  She is retired and very happy with this decision.  She does walk 3 times a week but does not exercise. She tries to eat healthy.      Colonoscopy---8/2016 repeat in 8 years  Mammogram----1/2018 neg  DEXA-----8/2017 normal  Pap-----once since [facility] neg  Tdap---7/2008  Influenza  "vaccine---8/2017  Pneumovax 23----11/2015  Prevnar 13----12/2016  Shingles vaccine-----11/2015, shingrex 6/2018    Review of Systems   Constitutional: Positive for fatigue. Negative for appetite change, fever and unexpected weight change.   HENT: Negative for congestion, ear pain, hearing loss, sore throat and trouble swallowing.    Eyes: Negative for pain and visual disturbance.   Respiratory: Negative for cough, chest tightness, shortness of breath and wheezing.    Cardiovascular: Negative for chest pain, palpitations and leg swelling.   Gastrointestinal: Negative for abdominal pain, blood in stool, constipation, diarrhea, nausea and vomiting.   Endocrine: Negative for polyuria.   Genitourinary: Negative for dysuria and hematuria.   Musculoskeletal: Positive for arthralgias (right shoulder, hands and kneees). Negative for back pain and myalgias.   Skin: Negative for rash.   Neurological: Negative for dizziness, weakness, numbness and headaches.   Hematological: Does not bruise/bleed easily.   Psychiatric/Behavioral: Positive for dysphoric mood. Negative for sleep disturbance and suicidal ideas. The patient is nervous/anxious.        Objective:      Vitals:    05/22/19 0830   BP: (!) 150/74   Pulse: 72   Resp: 18   Temp: 98.1 °F (36.7 °C)   SpO2: 95%   Weight: 61.1 kg (134 lb 12.8 oz)   Height: 5' 3" (1.6 m)     Body mass index is 23.88 kg/m².  Physical Exam    General appearance: No acute distress, cooperative  Eyes: PERRL, EOMI, conjunctiva clear  Ears: normal external ear and pinna, tm clear without drainage, canals clear  Nose: Normal mucosa without drainage  Throat: no exudates or erythema, tonsils not enlarged  Mouth: no sores or lesions, moist mucous membranes  Neck: FROM, soft, supple, no thyromegaly, no bruits  Lymph: no anterior or posterior cervical adenopathy  Heart::  Regular rate and rhythm, no murmur  Lung: Clear to ascultation bilaterally, no wheezing, no rales, no rhonchi, no distress  Abdomen: " Soft, nontender, no distention, no hepatosplenomegaly, bowel sounds normal, no guarding, no rebound, no peritoneal signs  Skin: no rashes, no lesions  Extremities: no edema, no cyanosis  Neuro: CN 2-12 intact, 5/5 muscle strength upper and lower extremity bilaterally, 2+ DTRs UE and LE bilaterally, normal gait, normal sensation  Peripheral pulses: 2+ pedal pulses bilaterally, good perfusion and color  Musculoskeletal: FROM, good strenth,  Tenderness right trapezius muscles with christina spasm  Joint: Shoulder: normal appearance, no swelling, no warmth, no deformity in all joints. Tenderness with ROM testing but able to do FROM with active and passive testing  Both hands with deformity and swelling at DIP and PIP with nodules and erythema and warmth.     Assessment:       1. Essential hypertension    2. Hyperlipidemia, unspecified hyperlipidemia type    3. NAFLD (nonalcoholic fatty liver disease)    4. Major depressive disorder, recurrent episode, mild    5. SORAYA (generalized anxiety disorder)    6. Seizure disorder    7. History of lymphoma    8. Primary osteoarthritis of both hands    9. Injury of right shoulder, initial encounter    10. Asymptomatic menopausal state    11. Need for diphtheria-tetanus-pertussis (Tdap) vaccine        Plan:       Essential hypertension  Uncontrolled. Will add losartan to her regimen and recheck BP in 2 weeks with nurse. Will check labs today.   -     CBC auto differential  -     Comprehensive metabolic panel  -     Lipid panel  -     TSH  -     losartan (COZAAR) 25 MG tablet; Take 1 tablet (25 mg total) by mouth once daily.  Dispense: 90 tablet; Refill: 3    Hyperlipidemia, unspecified hyperlipidemia type  Good control on crestor 20 mg qday. She is on aspirin daily.     NAFLD (nonalcoholic fatty liver disease)  Stable with normal LFTs. Continue to monitor.     Major depressive disorder, recurrent episode, mild with SORAYA (generalized anxiety disorder)  Uncontrolled and will increase her  effexor dose. She will take 150 mg plus 75 mg for a total dose of 225 mg qday.   -     venlafaxine (EFFEXOR-XR) 75 MG 24 hr capsule; Take 1 capsule (75 mg total) by mouth once daily. To take in addition to the 150 mg dose for a total of 225 mg qday  Dispense: 90 capsule; Refill: 3    Seizure disorder  Stable and seizure free. She is tolerating lamictal well.  She is followed annually by neurology.     History of lymphoma  No active disease and follows annually with oncology.     Primary osteoarthritis of both hands  Fairly advance destructive arthritis of both hands. Will start meloxicam to use as needed for pain.      Injury of right shoulder, initial encounter  Reassurance given. Supportive care with NSAIDs and muscle relaxer for muscle spasms.  If worsens consider imaging and referral to PT.   -     tiZANidine (ZANAFLEX) 4 MG tablet; Take 1 tablet (4 mg total) by mouth nightly as needed.  Dispense: 20 tablet; Refill: 1  -     meloxicam (MOBIC) 7.5 MG tablet; Take 1 tablet (7.5 mg total) by mouth once daily.  Dispense: 30 tablet; Refill: 6    Asymptomatic menopausal state  -     DXA Bone Density Spine And Hip; Future; Expected date: 05/22/2019    Need for diphtheria-tetanus-pertussis (Tdap) vaccine  -     diphth,pertus,acell,,tetanus (BOOSTRIX TDAP) 2.5-8-5 Lf-mcg-Lf/0.5mL Susp; Inject 0.5 mLs into the muscle once. for 1 dose  Dispense: 0.5 mL; Refill: 0    Follow up in about 6 months (around 11/22/2019) for chronic medical issues.

## 2019-05-23 ENCOUNTER — TELEPHONE (OUTPATIENT)
Dept: FAMILY MEDICINE | Facility: CLINIC | Age: 64
End: 2019-05-23

## 2019-05-23 DIAGNOSIS — M19.049 ARTHRITIS OF HAND: Primary | ICD-10-CM

## 2019-05-23 NOTE — TELEPHONE ENCOUNTER
Please let her know that her labs looked good. Her liver,kidney and thyroid are normal.  Her cholesterol is well controlled.     She is mildly anemic and I do not know why. We need to follow this.  I want her to get some iron studies for me.    Also, I was thinking about her joints. I think we should get some xrays of the hands and a few more labs making sure she does not have any other types of arthritis.     Please schedule xrays and labs.     thanks

## 2019-05-27 ENCOUNTER — TELEPHONE (OUTPATIENT)
Dept: FAMILY MEDICINE | Facility: CLINIC | Age: 64
End: 2019-05-27

## 2019-05-27 ENCOUNTER — HOSPITAL ENCOUNTER (OUTPATIENT)
Dept: RADIOLOGY | Facility: HOSPITAL | Age: 64
Discharge: HOME | End: 2019-05-27
Attending: INTERNAL MEDICINE
Payer: MEDICARE

## 2019-05-27 DIAGNOSIS — M19.049 ARTHRITIS OF HAND: ICD-10-CM

## 2019-05-27 DIAGNOSIS — M19.049 HAND ARTHRITIS: Primary | ICD-10-CM

## 2019-05-27 PROCEDURE — 73130 X-RAY EXAM OF HAND: CPT | Mod: 50,TC,FY,PO

## 2019-05-27 PROCEDURE — 73130 X-RAY EXAM OF HAND: CPT | Mod: 26,50,, | Performed by: RADIOLOGY

## 2019-05-27 PROCEDURE — 73130 XR HAND COMPLETE 3 VIEWS BILATERAL: ICD-10-PCS | Mod: 26,50,, | Performed by: RADIOLOGY

## 2019-05-27 NOTE — TELEPHONE ENCOUNTER
Please let her know that the xrays of her hand show degenerative changes consistent with osteoarthritis.  Her labs for RA are not all back but the results I do have look reassuring.     The xrays do show advancement of the arthritis.     Thanks

## 2019-05-30 RX ORDER — LAMOTRIGINE 300 MG/1
TABLET, EXTENDED RELEASE ORAL
Qty: 90 TABLET | Refills: 3 | Status: SHIPPED | OUTPATIENT
Start: 2019-05-30 | End: 2019-07-12 | Stop reason: SDUPTHER

## 2019-05-30 NOTE — TELEPHONE ENCOUNTER
The remaining arthritis markers are negative.     I am concerned about something called psoriatic arthritis so I am making a referral to rheumatology.     Please schedule.     Thanks

## 2019-05-30 NOTE — TELEPHONE ENCOUNTER
Spoke to pt, gave her Dr Lockwood message.   Explained that Rheumatology has to call her to schedule this appointment, message forwarded to Dr Fuller's office.

## 2019-05-31 NOTE — TELEPHONE ENCOUNTER
I received this message from Dr Fuller's office.      OLGA Roberto LPN   Caller: Unspecified (4 days ago,  4:26 PM)             Dr Fuller or Dr Crespo do not have any new patient appointments available at this time. If pt needs to be seen urgently please send to [place] or [facility]

## 2019-05-31 NOTE — TELEPHONE ENCOUNTER
She may be able to go to [facility].     I think that would be good.     Thanks

## 2019-06-05 ENCOUNTER — TELEPHONE (OUTPATIENT)
Dept: FAMILY MEDICINE | Facility: CLINIC | Age: 64
End: 2019-06-05

## 2019-06-05 ENCOUNTER — CLINICAL SUPPORT (OUTPATIENT)
Dept: FAMILY MEDICINE | Facility: CLINIC | Age: 64
End: 2019-06-05
Payer: MEDICARE

## 2019-06-05 VITALS — DIASTOLIC BLOOD PRESSURE: 86 MMHG | HEART RATE: 84 BPM | SYSTOLIC BLOOD PRESSURE: 140 MMHG

## 2019-06-05 RX ORDER — MELOXICAM 7.5 MG/1
7.5 TABLET ORAL DAILY PRN
COMMUNITY
End: 2020-05-22

## 2019-06-05 RX ORDER — LOSARTAN POTASSIUM 50 MG/1
50 TABLET ORAL DAILY
Qty: 90 TABLET | Refills: 3 | Status: SHIPPED | OUTPATIENT
Start: 2019-06-05 | End: 2019-07-10 | Stop reason: SDUPTHER

## 2019-06-05 NOTE — TELEPHONE ENCOUNTER
Pt here for BP check. /86 , Pulse 84 . Repeat after 5  Min 140/84  . Pt taking HCTZ 25 mg in the am and Losartan 25 mg in the am . Pt denies any symptoms. Pls advise. --lp

## 2019-06-05 NOTE — TELEPHONE ENCOUNTER
Dr Marisela Lockwood would like to refer this pt for evaluation . Dr Fuller and DR Crespo are not accepting new pts . Pt is comfortable being seen on the [facility]. Pls call pt to sched an appt. Thx.--lp

## 2019-06-05 NOTE — PROGRESS NOTES
Pt here for BP check.     Keena Banks 64 y.o. female is here today for Blood Pressure check.   History of HTN yes.    Review of patient's allergies indicates:   Allergen Reactions    Adhesive Rash    Sulfa (sulfonamide antibiotics) Rash     Creatinine   Date Value Ref Range Status   05/22/2019 0.9 0.5 - 1.4 mg/dL Final     Sodium   Date Value Ref Range Status   05/22/2019 140 136 - 145 mmol/L Final     Potassium   Date Value Ref Range Status   05/22/2019 3.6 3.5 - 5.1 mmol/L Final   ]  Patient verifies taking blood pressure medications on a regular basis at the same time of the day.     Current Outpatient Medications:     flaxseed oil 1,030 mg Cap, Take 1 capsule by mouth once daily. Every morning, Disp: , Rfl:     fluticasone (FLONASE) 50 mcg/actuation nasal spray, 2 sprays by Each Nare route once daily., Disp: 1 Bottle, Rfl: 6    folic acid (FOLVITE) 1 MG tablet, Take 1 mg by mouth once daily.  , Disp: , Rfl:     hydroCHLOROthiazide (HYDRODIURIL) 25 MG tablet, TAKE 1 TABLET BY MOUTH EVERY DAY, Disp: 90 tablet, Rfl: 3    ibuprofen (ADVIL,MOTRIN) 200 MG tablet, Take 400 mg by mouth every 6 (six) hours as needed for Pain., Disp: , Rfl:     lamotrigine XR (LAMICTAL XR) 300 mg XR tablet, TAKE 1 TABLET BY MOUTH EVERY DAY, Disp: 90 tablet, Rfl: 3    losartan (COZAAR) 25 MG tablet, Take 1 tablet (25 mg total) by mouth once daily., Disp: 90 tablet, Rfl: 3    meloxicam (MOBIC) 7.5 MG tablet, Take 1 tablet (7.5 mg total) by mouth once daily., Disp: 30 tablet, Rfl: 6    multivitamin with minerals tablet, Take 1 tablet by mouth once daily.  , Disp: , Rfl:     rosuvastatin (CRESTOR) 20 MG tablet, TAKE 1 TABLET BY MOUTH ONCE DAILY, Disp: 90 tablet, Rfl: 3    venlafaxine (EFFEXOR-XR) 150 MG Cp24, TAKE 1 CAPSULE (150 MG TOTAL) BY MOUTH ONCE DAILY., Disp: 90 capsule, Rfl: 2    venlafaxine (EFFEXOR-XR) 75 MG 24 hr capsule, Take 1 capsule (75 mg total) by mouth once daily. To take in addition to the 150 mg dose for a  total of 225 mg qday, Disp: 90 capsule, Rfl: 3  Does patient have record of home blood pressure readings no.   Last dose of blood pressure medication was taken at 8:30 am today   Patient is asymptomatic.         ,     Blood pressure reading after 15 minutes was 140/ 86 , Pulse 84     Repeat after 5  min 140/84  Dr. Lockwood  notified.

## 2019-06-20 ENCOUNTER — CLINICAL SUPPORT (OUTPATIENT)
Dept: FAMILY MEDICINE | Facility: CLINIC | Age: 64
End: 2019-06-20
Payer: MEDICARE

## 2019-06-20 VITALS
RESPIRATION RATE: 20 BRPM | DIASTOLIC BLOOD PRESSURE: 64 MMHG | HEART RATE: 60 BPM | SYSTOLIC BLOOD PRESSURE: 128 MMHG | OXYGEN SATURATION: 99 %

## 2019-06-20 NOTE — PROGRESS NOTES
Keena Banks 64 y.o. female is here today for Blood Pressure check.   History of HTN yes.    Review of patient's allergies indicates:   Allergen Reactions    Adhesive Rash    Sulfa (sulfonamide antibiotics) Rash     Creatinine   Date Value Ref Range Status   05/22/2019 0.9 0.5 - 1.4 mg/dL Final     Sodium   Date Value Ref Range Status   05/22/2019 140 136 - 145 mmol/L Final     Potassium   Date Value Ref Range Status   05/22/2019 3.6 3.5 - 5.1 mmol/L Final   ]  Patient verifies taking blood pressure medications on a regular basis at the same time of the day.     Current Outpatient Medications:     fluticasone (FLONASE) 50 mcg/actuation nasal spray, 2 sprays by Each Nare route once daily., Disp: 1 Bottle, Rfl: 6    folic acid (FOLVITE) 1 MG tablet, Take 1 mg by mouth once daily.  , Disp: , Rfl:     hydroCHLOROthiazide (HYDRODIURIL) 25 MG tablet, TAKE 1 TABLET BY MOUTH EVERY DAY, Disp: 90 tablet, Rfl: 3    ibuprofen (ADVIL,MOTRIN) 200 MG tablet, Take 400 mg by mouth every 6 (six) hours as needed for Pain., Disp: , Rfl:     lamotrigine XR (LAMICTAL XR) 300 mg XR tablet, TAKE 1 TABLET BY MOUTH EVERY DAY, Disp: 90 tablet, Rfl: 3    losartan (COZAAR) 50 MG tablet, Take 1 tablet (50 mg total) by mouth once daily., Disp: 90 tablet, Rfl: 3    meloxicam (MOBIC) 7.5 MG tablet, Take 7.5 mg by mouth daily as needed for Pain., Disp: , Rfl:     multivitamin with minerals tablet, Take 1 tablet by mouth once daily.  , Disp: , Rfl:     rosuvastatin (CRESTOR) 20 MG tablet, TAKE 1 TABLET BY MOUTH ONCE DAILY, Disp: 90 tablet, Rfl: 3    venlafaxine (EFFEXOR-XR) 150 MG Cp24, TAKE 1 CAPSULE (150 MG TOTAL) BY MOUTH ONCE DAILY., Disp: 90 capsule, Rfl: 2    venlafaxine (EFFEXOR-XR) 75 MG 24 hr capsule, Take 1 capsule (75 mg total) by mouth once daily. To take in addition to the 150 mg dose for a total of 225 mg qday, Disp: 90 capsule, Rfl: 3  Does patient have record of home blood pressure readings yes. Readings have been  averaging 120/70. Copy of pts BP log placed in Dr. Lockwood [facility].  Last dose of blood pressure medication was taken at 6/20/19 @ 1130.  Patient is asymptomatic.   Complains of nothing.    BP: 128/64 , Pulse: 60 .

## 2019-07-08 DIAGNOSIS — E78.5 HYPERLIPIDEMIA, UNSPECIFIED HYPERLIPIDEMIA TYPE: ICD-10-CM

## 2019-07-08 DIAGNOSIS — F33.0 MAJOR DEPRESSIVE DISORDER, RECURRENT, MILD: ICD-10-CM

## 2019-07-08 RX ORDER — ROSUVASTATIN CALCIUM 20 MG/1
TABLET, COATED ORAL
Qty: 90 TABLET | Refills: 3 | Status: SHIPPED | OUTPATIENT
Start: 2019-07-08 | End: 2019-07-23 | Stop reason: SDUPTHER

## 2019-07-08 RX ORDER — VENLAFAXINE HYDROCHLORIDE 150 MG/1
150 CAPSULE, EXTENDED RELEASE ORAL DAILY
Qty: 90 CAPSULE | Refills: 2 | Status: SHIPPED | OUTPATIENT
Start: 2019-07-08 | End: 2019-07-23 | Stop reason: SDUPTHER

## 2019-07-10 DIAGNOSIS — I10 ESSENTIAL HYPERTENSION: Primary | ICD-10-CM

## 2019-07-10 RX ORDER — LOSARTAN POTASSIUM 50 MG/1
50 TABLET ORAL DAILY
Qty: 90 TABLET | Refills: 3 | Status: SHIPPED | OUTPATIENT
Start: 2019-07-10 | End: 2020-08-13

## 2019-07-10 RX ORDER — HYDROCHLOROTHIAZIDE 25 MG/1
25 TABLET ORAL DAILY
Qty: 90 TABLET | Refills: 3 | Status: SHIPPED | OUTPATIENT
Start: 2019-07-10 | End: 2020-07-10

## 2019-07-13 RX ORDER — LAMOTRIGINE 300 MG/1
300 TABLET, EXTENDED RELEASE ORAL DAILY
Qty: 90 TABLET | Refills: 0 | Status: SHIPPED | OUTPATIENT
Start: 2019-07-13 | End: 2019-11-05 | Stop reason: SDUPTHER

## 2019-07-15 ENCOUNTER — INITIAL CONSULT (OUTPATIENT)
Dept: RHEUMATOLOGY | Facility: CLINIC | Age: 64
End: 2019-07-15
Payer: MEDICARE

## 2019-07-15 VITALS
SYSTOLIC BLOOD PRESSURE: 125 MMHG | BODY MASS INDEX: 24.57 KG/M2 | HEART RATE: 69 BPM | DIASTOLIC BLOOD PRESSURE: 72 MMHG | WEIGHT: 138.69 LBS | HEIGHT: 63 IN

## 2019-07-15 DIAGNOSIS — M19.049 PRIMARY LOCALIZED OSTEOARTHROSIS OF HAND, UNSPECIFIED LATERALITY: Primary | ICD-10-CM

## 2019-07-15 PROCEDURE — 3008F BODY MASS INDEX DOCD: CPT | Mod: S$GLB,,, | Performed by: INTERNAL MEDICINE

## 2019-07-15 PROCEDURE — 99204 OFFICE O/P NEW MOD 45 MIN: CPT | Mod: S$GLB,,, | Performed by: INTERNAL MEDICINE

## 2019-07-15 PROCEDURE — 3288F FALL RISK ASSESSMENT DOCD: CPT | Mod: S$GLB,,, | Performed by: INTERNAL MEDICINE

## 2019-07-15 PROCEDURE — 3288F PR FALLS RISK ASSESSMENT DOCUMENTED: ICD-10-PCS | Mod: S$GLB,,, | Performed by: INTERNAL MEDICINE

## 2019-07-15 PROCEDURE — 3074F SYST BP LT 130 MM HG: CPT | Mod: S$GLB,,, | Performed by: INTERNAL MEDICINE

## 2019-07-15 PROCEDURE — 3078F DIAST BP <80 MM HG: CPT | Mod: S$GLB,,, | Performed by: INTERNAL MEDICINE

## 2019-07-15 PROCEDURE — 3008F PR BODY MASS INDEX (BMI) DOCUMENTED: ICD-10-PCS | Mod: S$GLB,,, | Performed by: INTERNAL MEDICINE

## 2019-07-15 PROCEDURE — 3078F PR MOST RECENT DIASTOLIC BLOOD PRESSURE < 80 MM HG: ICD-10-PCS | Mod: S$GLB,,, | Performed by: INTERNAL MEDICINE

## 2019-07-15 PROCEDURE — 99999 PR PBB SHADOW E&M-EST. PATIENT-LVL III: ICD-10-PCS | Mod: PBBFAC,,, | Performed by: INTERNAL MEDICINE

## 2019-07-15 PROCEDURE — 99204 PR OFFICE/OUTPT VISIT, NEW, LEVL IV, 45-59 MIN: ICD-10-PCS | Mod: S$GLB,,, | Performed by: INTERNAL MEDICINE

## 2019-07-15 PROCEDURE — 3074F PR MOST RECENT SYSTOLIC BLOOD PRESSURE < 130 MM HG: ICD-10-PCS | Mod: S$GLB,,, | Performed by: INTERNAL MEDICINE

## 2019-07-15 PROCEDURE — 99999 PR PBB SHADOW E&M-EST. PATIENT-LVL III: CPT | Mod: PBBFAC,,, | Performed by: INTERNAL MEDICINE

## 2019-07-15 PROCEDURE — 1100F PR PT FALLS ASSESS DOC 2+ FALLS/FALL W/INJURY/YR: ICD-10-PCS | Mod: S$GLB,,, | Performed by: INTERNAL MEDICINE

## 2019-07-15 PROCEDURE — 1100F PTFALLS ASSESS-DOCD GE2>/YR: CPT | Mod: S$GLB,,, | Performed by: INTERNAL MEDICINE

## 2019-07-15 ASSESSMENT — ROUTINE ASSESSMENT OF PATIENT INDEX DATA (RAPID3)
FATIGUE SCORE: 1.5
TOTAL RAPID3 SCORE: 1.83
MDHAQ FUNCTION SCORE: 0
PAIN SCORE: 4
PSYCHOLOGICAL DISTRESS SCORE: 0
PATIENT GLOBAL ASSESSMENT SCORE: 1.5
AM STIFFNESS SCORE: 1, YES

## 2019-07-15 NOTE — PROGRESS NOTES
History of present illness:  64-year-old female has a bout a 3 year history of hand pain.  The pain was of gradual onset.  Initially it was only if she over did it.  It is now occurring on a daily basis.  The pain is bad in the morning.  It gets some better with activity.  It occasionally wakes her up at night.  She has noticed some swelling in the DIP is and also in her toe and feet.  She has some erythema but no increased warmth accompanying the swelling.  She has had some pain in the neck and shoulder.  She has had some pain in the knees and toes.  She has 5 min of morning stiffness.  She has had previous carpal tunnel surgery on the right wrist.  She is developing numbness in the left hand.    She has been taking ibuprofen with some relief.  She does not take it on a regular basis.  Heat, ice, and topical medications have helped.  The pain does not interfere with activity    She has noted some red patches on the face.  It occasionally she has dryness of the elbows.  She has no headache or fever.  She has occasional eye redness.  She complains of dry eye and mouth.  She has no oral ulcers, Raynaud's phenomena, pleurisy, vaginal discharge or ulcers, chronic or bloody diarrhea.    Systems review:  General:  She lost 12 lb by changing her diet  GI:  No abdominal pain or peptic ulcer disease.  No liver problems.  :  Occasional urinary tract infection    Physical examination:  Skin:  She has slight erythematous patches on her face but no scaling.  She has no other rashes.  ENT:  Adequate tears and saliva  Chest:  Clear to auscultation and percussion  Cardiac:  No murmurs, gallops, rubs  Abdomen:  No organomegaly or masses.  No tenderness to palpation  Musculoskeletal:  Cervical spine has good range of motion without pain on range of motion.  Shoulders and elbows are unremarkable.  She has negative Tinel sign at the wrist.  She has bony hypertrophy of the 1st CMC bilaterally, worse on the left than on the right.  She  has mild bony hypertrophy of the PIPs in marked bony hypertrophy of the DIPs.  There is no erythema or increased warmth.  There is no palpable fluid.  Lumbar spine has good range of motion without pain on range of motion  Hips have good range of motion  The right knee is tender but not swollen.  Left knee has no tenderness.  She has full range of motion of both knees.  She has bony enlargement of the subtalar joints and bilateral bunion deformities, she has no synovitis of the lower extremities.  Laboratory:  Normal sed rate, CRP some eye:  Negative PHYLLIS, rheumatoid factor, CCP antibody  Radiology:  X-ray of the hands reveals marked DJD of the 1st CMC bilaterally.  She has changes of erosive osteoarthritis of the DIP is bilaterally.    Assessment:  1.  Erosive osteoarthritis of the hands  2.  The skin rash appears to be rosacea.  I do not find evidence to suggest psoriatic arthritis    Plans:  I had a long discussion with her about osteoarthritis.  Unfortunately the treatment is purely symptomatic.  She should continue her present management.  I did not give her regular return appointment but would be happy to see her in follow-up if necessary.

## 2019-07-23 DIAGNOSIS — F41.1 GAD (GENERALIZED ANXIETY DISORDER): ICD-10-CM

## 2019-07-23 DIAGNOSIS — E78.5 HYPERLIPIDEMIA, UNSPECIFIED HYPERLIPIDEMIA TYPE: ICD-10-CM

## 2019-07-23 DIAGNOSIS — F33.0 MAJOR DEPRESSIVE DISORDER, RECURRENT, MILD: ICD-10-CM

## 2019-07-23 NOTE — TELEPHONE ENCOUNTER
----- Message from Diamond Wall sent at 7/23/2019  9:59 AM CDT -----  Contact: [facility]  Type: Need new Rx   Needs Medical Advice    Who Called:  Jerri  Grabiel Call Back Number: [phone]  Additional Information: Rx needed is Venlafaxine hcl er 150 mg caps,,and rosuvastatin 20 mg, venlafaxine hcl er75 mg need a new 90 day wants it E- scribed to [address], [facility] [address]

## 2019-07-24 RX ORDER — VENLAFAXINE HYDROCHLORIDE 150 MG/1
150 CAPSULE, EXTENDED RELEASE ORAL DAILY
Qty: 90 CAPSULE | Refills: 2 | Status: SHIPPED | OUTPATIENT
Start: 2019-07-24 | End: 2020-07-23

## 2019-07-24 RX ORDER — ROSUVASTATIN CALCIUM 20 MG/1
20 TABLET, COATED ORAL DAILY
Qty: 90 TABLET | Refills: 3 | Status: SHIPPED | OUTPATIENT
Start: 2019-07-24 | End: 2020-09-22

## 2019-07-24 RX ORDER — VENLAFAXINE HYDROCHLORIDE 75 MG/1
75 CAPSULE, EXTENDED RELEASE ORAL DAILY
Qty: 90 CAPSULE | Refills: 3 | Status: SHIPPED | OUTPATIENT
Start: 2019-07-24 | End: 2020-07-10

## 2019-08-08 ENCOUNTER — TELEPHONE (OUTPATIENT)
Dept: NEUROLOGY | Facility: CLINIC | Age: 64
End: 2019-08-08

## 2019-08-08 NOTE — TELEPHONE ENCOUNTER
----- Message from Gillian Bhandari sent at 8/8/2019  8:49 AM CDT -----  Type:  Sooner Apoointment Request    Caller is requesting a sooner appointment.  Caller declined first available appointment listed below.  Caller will not accept being placed on the waitlist and is requesting a message be sent to doctor.    Name of Caller:  Patient  When is the first available appointment?  Next year  Symptoms:  Seizures  Best Call Back Number:  [phone] (home)     Additional Information:  Says she will be out of her medication in another two months. She has called in twice before and could not get an appointment. Please call to advise.

## 2019-08-08 NOTE — TELEPHONE ENCOUNTER
Left message that I am waiting for the October schedule to open and that she can get refills until the follow up visit.

## 2019-08-21 ENCOUNTER — TELEPHONE (OUTPATIENT)
Dept: NEUROLOGY | Facility: CLINIC | Age: 64
End: 2019-08-21

## 2019-08-23 ENCOUNTER — LAB VISIT (OUTPATIENT)
Dept: LAB | Facility: HOSPITAL | Age: 64
End: 2019-08-23
Attending: INTERNAL MEDICINE
Payer: MEDICARE

## 2019-08-23 DIAGNOSIS — C82.90 NHL (NODULAR HISTIOCYTIC LYMPHOMA): ICD-10-CM

## 2019-08-23 LAB
ALBUMIN SERPL BCP-MCNC: 4.6 G/DL (ref 3.5–5.2)
ALP SERPL-CCNC: 95 U/L (ref 55–135)
ALT SERPL W/O P-5'-P-CCNC: 23 U/L (ref 10–44)
ANION GAP SERPL CALC-SCNC: 11 MMOL/L (ref 8–16)
AST SERPL-CCNC: 29 U/L (ref 10–40)
B2 MICROGLOB SERPL-MCNC: 1.2 UG/ML (ref 0–2.5)
BASOPHILS # BLD AUTO: 0.07 K/UL (ref 0–0.2)
BASOPHILS NFR BLD: 1.5 % (ref 0–1.9)
BILIRUB SERPL-MCNC: 0.5 MG/DL (ref 0.1–1)
BUN SERPL-MCNC: 17 MG/DL (ref 8–23)
CALCIUM SERPL-MCNC: 10.2 MG/DL (ref 8.7–10.5)
CHLORIDE SERPL-SCNC: 102 MMOL/L (ref 95–110)
CO2 SERPL-SCNC: 28 MMOL/L (ref 23–29)
CREAT SERPL-MCNC: 1 MG/DL (ref 0.5–1.4)
DIFFERENTIAL METHOD: ABNORMAL
EOSINOPHIL # BLD AUTO: 0.2 K/UL (ref 0–0.5)
EOSINOPHIL NFR BLD: 5.2 % (ref 0–8)
ERYTHROCYTE [DISTWIDTH] IN BLOOD BY AUTOMATED COUNT: 13.2 % (ref 11.5–14.5)
EST. GFR  (AFRICAN AMERICAN): >60 ML/MIN/1.73 M^2
EST. GFR  (NON AFRICAN AMERICAN): 59 ML/MIN/1.73 M^2
GLUCOSE SERPL-MCNC: 79 MG/DL (ref 70–110)
HCT VFR BLD AUTO: 36.5 % (ref 37–48.5)
HGB BLD-MCNC: 12.2 G/DL (ref 12–16)
LDH SERPL L TO P-CCNC: 247 U/L (ref 110–260)
LYMPHOCYTES # BLD AUTO: 1.4 K/UL (ref 1–4.8)
LYMPHOCYTES NFR BLD: 29.3 % (ref 18–48)
MCH RBC QN AUTO: 29.2 PG (ref 27–31)
MCHC RBC AUTO-ENTMCNC: 33.4 G/DL (ref 32–36)
MCV RBC AUTO: 87 FL (ref 82–98)
MONOCYTES # BLD AUTO: 0.4 K/UL (ref 0.3–1)
MONOCYTES NFR BLD: 8.9 % (ref 4–15)
NEUTROPHILS # BLD AUTO: 2.5 K/UL (ref 1.8–7.7)
NEUTROPHILS NFR BLD: 55.1 % (ref 38–73)
PLATELET # BLD AUTO: 265 K/UL (ref 150–350)
PMV BLD AUTO: 9.8 FL (ref 9.2–12.9)
POTASSIUM SERPL-SCNC: 3.9 MMOL/L (ref 3.5–5.1)
PROT SERPL-MCNC: 7.1 G/DL (ref 6–8.4)
RBC # BLD AUTO: 4.18 M/UL (ref 4–5.4)
SODIUM SERPL-SCNC: 141 MMOL/L (ref 136–145)
WBC # BLD AUTO: 4.6 K/UL (ref 3.9–12.7)

## 2019-08-23 PROCEDURE — 80053 COMPREHEN METABOLIC PANEL: CPT | Mod: PO

## 2019-08-23 PROCEDURE — 83615 LACTATE (LD) (LDH) ENZYME: CPT | Mod: PO

## 2019-08-23 PROCEDURE — 36415 COLL VENOUS BLD VENIPUNCTURE: CPT | Mod: PO

## 2019-08-23 PROCEDURE — 82232 ASSAY OF BETA-2 PROTEIN: CPT

## 2019-08-23 PROCEDURE — 85025 COMPLETE CBC W/AUTO DIFF WBC: CPT | Mod: PO

## 2019-08-27 ENCOUNTER — OFFICE VISIT (OUTPATIENT)
Dept: HEMATOLOGY/ONCOLOGY | Facility: CLINIC | Age: 64
End: 2019-08-27
Payer: MEDICARE

## 2019-08-27 VITALS
HEART RATE: 78 BPM | RESPIRATION RATE: 16 BRPM | BODY MASS INDEX: 23.63 KG/M2 | TEMPERATURE: 98 F | HEIGHT: 63 IN | SYSTOLIC BLOOD PRESSURE: 122 MMHG | OXYGEN SATURATION: 95 % | WEIGHT: 133.38 LBS | DIASTOLIC BLOOD PRESSURE: 61 MMHG

## 2019-08-27 DIAGNOSIS — C82.90 NHL (NODULAR HISTIOCYTIC LYMPHOMA): Primary | ICD-10-CM

## 2019-08-27 PROCEDURE — 3074F SYST BP LT 130 MM HG: CPT | Mod: S$GLB,,, | Performed by: NURSE PRACTITIONER

## 2019-08-27 PROCEDURE — 3008F BODY MASS INDEX DOCD: CPT | Mod: S$GLB,,, | Performed by: NURSE PRACTITIONER

## 2019-08-27 PROCEDURE — 3078F DIAST BP <80 MM HG: CPT | Mod: S$GLB,,, | Performed by: NURSE PRACTITIONER

## 2019-08-27 PROCEDURE — 3074F PR MOST RECENT SYSTOLIC BLOOD PRESSURE < 130 MM HG: ICD-10-PCS | Mod: S$GLB,,, | Performed by: NURSE PRACTITIONER

## 2019-08-27 PROCEDURE — 99999 PR PBB SHADOW E&M-EST. PATIENT-LVL IV: ICD-10-PCS | Mod: PBBFAC,,, | Performed by: NURSE PRACTITIONER

## 2019-08-27 PROCEDURE — 99999 PR PBB SHADOW E&M-EST. PATIENT-LVL IV: CPT | Mod: PBBFAC,,, | Performed by: NURSE PRACTITIONER

## 2019-08-27 PROCEDURE — 99213 OFFICE O/P EST LOW 20 MIN: CPT | Mod: S$GLB,,, | Performed by: NURSE PRACTITIONER

## 2019-08-27 PROCEDURE — 3078F PR MOST RECENT DIASTOLIC BLOOD PRESSURE < 80 MM HG: ICD-10-PCS | Mod: S$GLB,,, | Performed by: NURSE PRACTITIONER

## 2019-08-27 PROCEDURE — 1101F PR PT FALLS ASSESS DOC 0-1 FALLS W/OUT INJ PAST YR: ICD-10-PCS | Mod: S$GLB,,, | Performed by: NURSE PRACTITIONER

## 2019-08-27 PROCEDURE — 99213 PR OFFICE/OUTPT VISIT, EST, LEVL III, 20-29 MIN: ICD-10-PCS | Mod: S$GLB,,, | Performed by: NURSE PRACTITIONER

## 2019-08-27 PROCEDURE — 1101F PT FALLS ASSESS-DOCD LE1/YR: CPT | Mod: S$GLB,,, | Performed by: NURSE PRACTITIONER

## 2019-08-27 PROCEDURE — 3008F PR BODY MASS INDEX (BMI) DOCUMENTED: ICD-10-PCS | Mod: S$GLB,,, | Performed by: NURSE PRACTITIONER

## 2019-08-27 NOTE — PROGRESS NOTES
HISTORY OF PRESENT ILLNESS:  This is a 64-year-old white female known   to Dr. Campos for Stage IIIA diffuse large B-cell non-Hodgkin lymphoma that   presented with adenopathy in the left supraclavicular region.  She was treated   with 8 cycles of CHOP/Rituxan and went into complete remission.      She presents to the clinic today for her 84-month post-therapy evaluation in good   spirits.  She remains active and well.  She denies any recurrent illness, fevers,   chills, drenching night sweats, painful lymphadenopathy, unexplained weight loss,   rashes, pruritus, abdominal discomfort/bloating, nausea, vomiting, constipation,   diarrhea, irregular heartbeat, chest pain, bleeding, etc. She continues to follow  Dr. Mena every 6 months.  No other new complaints or pertinent findings on   14-point review of systems.    PHYSICAL EXAMINATION:  GENERAL:  Well-developed, well-nourished white female in no acute distress.    Alert and oriented x3.  VITAL SIGNS:  Weight:  Loss of 8 pounds in 1 year with diet  Wt Readings from Last 3 Encounters:   08/27/19 60.5 kg (133 lb 6.1 oz)   07/15/19 62.9 kg (138 lb 10.7 oz)   05/22/19 61.1 kg (134 lb 12.8 oz)     Temp Readings from Last 3 Encounters:   08/27/19 98.4 °F (36.9 °C) (Oral)   05/22/19 98.1 °F (36.7 °C)   01/09/19 98.4 °F (36.9 °C) (Oral)     BP Readings from Last 3 Encounters:   08/27/19 122/61   07/15/19 125/72   06/20/19 128/64     Pulse Readings from Last 3 Encounters:   08/27/19 78   07/15/19 69   06/20/19 60     HEENT:  Normocephalic, atraumatic.  Oral mucosa pink and moist.  Lips without   lesions.  Tongue midline.  Oropharynx clear.  Nonicteric sclerae.  NECK:  Supple, no adenopathy.  No carotid bruits, thyromegaly or thyroid nodule.  HEART:  Regular rate and rhythm without murmur, gallop or rub.  LUNGS:  Clear to auscultation bilaterally.  Normal respiratory effort.  ABDOMEN:  Soft, nontender, nondistended with positive normoactive bowel sounds,   no  hepatosplenomegaly.  EXTREMITIES:  No cyanosis, clubbing or edema.  Distal pulses are intact.  AXILLAE AND GROIN:  No palpable pathologic lymphadenopathy is appreciated.  SKIN:  Intact/turgor normal.  NEUROLOGIC:  Cranial nerves II-XII grossly intact.  Motor:  Good muscle bulk and   tone.  Strength/sensory 5/5 throughout.  Gait stable.    LABORATORY:    Lab Results   Component Value Date    WBC 4.60 08/23/2019    RBC 4.18 08/23/2019    HGB 12.2 08/23/2019    HCT 36.5 (L) 08/23/2019    MCV 87 08/23/2019    MCH 29.2 08/23/2019    MCHC 33.4 08/23/2019    RDW 13.2 08/23/2019     08/23/2019    MPV 9.8 08/23/2019    GRAN 2.5 08/23/2019    GRAN 55.1 08/23/2019    LYMPH 1.4 08/23/2019    LYMPH 29.3 08/23/2019    MONO 0.4 08/23/2019    MONO 8.9 08/23/2019    EOS 0.2 08/23/2019    BASO 0.07 08/23/2019    EOSINOPHIL 5.2 08/23/2019    BASOPHIL 1.5 08/23/2019         CMP  Sodium   Date Value Ref Range Status   08/23/2019 141 136 - 145 mmol/L Final     Potassium   Date Value Ref Range Status   08/23/2019 3.9 3.5 - 5.1 mmol/L Final     Chloride   Date Value Ref Range Status   08/23/2019 102 95 - 110 mmol/L Final     CO2   Date Value Ref Range Status   08/23/2019 28 23 - 29 mmol/L Final     Glucose   Date Value Ref Range Status   08/23/2019 79 70 - 110 mg/dL Final     BUN, Bld   Date Value Ref Range Status   08/23/2019 17 8 - 23 mg/dL Final     Creatinine   Date Value Ref Range Status   08/23/2019 1.0 0.5 - 1.4 mg/dL Final     Calcium   Date Value Ref Range Status   08/23/2019 10.2 8.7 - 10.5 mg/dL Final     Total Protein   Date Value Ref Range Status   08/23/2019 7.1 6.0 - 8.4 g/dL Final     Albumin   Date Value Ref Range Status   08/23/2019 4.6 3.5 - 5.2 g/dL Final     Total Bilirubin   Date Value Ref Range Status   08/23/2019 0.5 0.1 - 1.0 mg/dL Final     Comment:     For infants and newborns, interpretation of results should be based  on gestational age, weight and in agreement with clinical  observations.  Premature  Infant recommended reference ranges:  Up to 24 hours.............<8.0 mg/dL  Up to 48 hours............<12.0 mg/dL  3-5 days..................<15.0 mg/dL  6-29 days.................<15.0 mg/dL       Alkaline Phosphatase   Date Value Ref Range Status   08/23/2019 95 55 - 135 U/L Final     AST   Date Value Ref Range Status   08/23/2019 29 10 - 40 U/L Final     ALT   Date Value Ref Range Status   08/23/2019 23 10 - 44 U/L Final     Anion Gap   Date Value Ref Range Status   08/23/2019 11 8 - 16 mmol/L Final     eGFR if    Date Value Ref Range Status   08/23/2019 >60 >60 mL/min/1.73 m^2 Final     eGFR if non    Date Value Ref Range Status   08/23/2019 59 (A) >60 mL/min/1.73 m^2 Final     Comment:     Calculation used to obtain the estimated glomerular filtration  rate (eGFR) is the CKD-EPI equation.        , [id number] 1.2    IMPRESSION:  1.  Stage IIIA diffuse large B-cell non-Hodgkin lymphoma - MUNIR.  2.  Idiopathic seizure disorder - stable.  3.  Elevated liver function test - fatty liver, remains stable.  4.  Skin cancer - follows Dr. Mena every 6 months    PLAN: Return in one year with interval CBC, CMP, LDH, and beta-2 microglobulin for annual surveillance.    Assessment/plan reviewed and approved by Dr. Campos.

## 2019-10-02 ENCOUNTER — OFFICE VISIT (OUTPATIENT)
Dept: NEUROLOGY | Facility: CLINIC | Age: 64
End: 2019-10-02
Payer: MEDICARE

## 2019-10-02 VITALS
BODY MASS INDEX: 23.51 KG/M2 | SYSTOLIC BLOOD PRESSURE: 110 MMHG | HEART RATE: 66 BPM | RESPIRATION RATE: 18 BRPM | DIASTOLIC BLOOD PRESSURE: 66 MMHG | WEIGHT: 132.69 LBS

## 2019-10-02 DIAGNOSIS — G40.909 SEIZURE DISORDER: Primary | ICD-10-CM

## 2019-10-02 PROCEDURE — 1101F PT FALLS ASSESS-DOCD LE1/YR: CPT | Mod: S$GLB,,, | Performed by: PSYCHIATRY & NEUROLOGY

## 2019-10-02 PROCEDURE — 3078F PR MOST RECENT DIASTOLIC BLOOD PRESSURE < 80 MM HG: ICD-10-PCS | Mod: S$GLB,,, | Performed by: PSYCHIATRY & NEUROLOGY

## 2019-10-02 PROCEDURE — 99999 PR PBB SHADOW E&M-EST. PATIENT-LVL III: ICD-10-PCS | Mod: PBBFAC,,, | Performed by: PSYCHIATRY & NEUROLOGY

## 2019-10-02 PROCEDURE — 99213 OFFICE O/P EST LOW 20 MIN: CPT | Mod: S$GLB,,, | Performed by: PSYCHIATRY & NEUROLOGY

## 2019-10-02 PROCEDURE — 99999 PR PBB SHADOW E&M-EST. PATIENT-LVL III: CPT | Mod: PBBFAC,,, | Performed by: PSYCHIATRY & NEUROLOGY

## 2019-10-02 PROCEDURE — 3008F PR BODY MASS INDEX (BMI) DOCUMENTED: ICD-10-PCS | Mod: S$GLB,,, | Performed by: PSYCHIATRY & NEUROLOGY

## 2019-10-02 PROCEDURE — 1101F PR PT FALLS ASSESS DOC 0-1 FALLS W/OUT INJ PAST YR: ICD-10-PCS | Mod: S$GLB,,, | Performed by: PSYCHIATRY & NEUROLOGY

## 2019-10-02 PROCEDURE — 3078F DIAST BP <80 MM HG: CPT | Mod: S$GLB,,, | Performed by: PSYCHIATRY & NEUROLOGY

## 2019-10-02 PROCEDURE — 3074F PR MOST RECENT SYSTOLIC BLOOD PRESSURE < 130 MM HG: ICD-10-PCS | Mod: S$GLB,,, | Performed by: PSYCHIATRY & NEUROLOGY

## 2019-10-02 PROCEDURE — 3074F SYST BP LT 130 MM HG: CPT | Mod: S$GLB,,, | Performed by: PSYCHIATRY & NEUROLOGY

## 2019-10-02 PROCEDURE — 3008F BODY MASS INDEX DOCD: CPT | Mod: S$GLB,,, | Performed by: PSYCHIATRY & NEUROLOGY

## 2019-10-02 PROCEDURE — 99213 PR OFFICE/OUTPT VISIT, EST, LEVL III, 20-29 MIN: ICD-10-PCS | Mod: S$GLB,,, | Performed by: PSYCHIATRY & NEUROLOGY
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
## 2019-10-07 ENCOUNTER — LAB VISIT (OUTPATIENT)
Dept: LAB | Facility: HOSPITAL | Age: 64
End: 2019-10-07
Attending: PSYCHIATRY & NEUROLOGY
Payer: MEDICARE

## 2019-10-07 DIAGNOSIS — G40.909 SEIZURE DISORDER: ICD-10-CM

## 2019-10-07 PROCEDURE — 80175 DRUG SCREEN QUAN LAMOTRIGINE: CPT

## 2019-10-07 PROCEDURE — 36415 COLL VENOUS BLD VENIPUNCTURE: CPT | Mod: PO

## 2019-10-09 LAB — LAMOTRIGINE SERPL-MCNC: 7.8 UG/ML (ref 2–15)

## 2019-10-28 ENCOUNTER — PATIENT OUTREACH (OUTPATIENT)
Dept: ADMINISTRATIVE | Facility: HOSPITAL | Age: 64
End: 2019-10-28

## 2019-10-28 NOTE — PROGRESS NOTES
Health Maintenance Due   Topic Date Due    Shingles Vaccine (2 of 2) 08/15/2018    Influenza Vaccine (1) 09/01/2019     Future Appointments   Date Time Provider Department [place]   11/7/2019  9:00 AM [facility] [id number] [facility] BMD [place]   11/11/2019  8:00 AM Marisela Lockwood DO [facility] MED [facility]   8/24/2020 10:30 AM LAB, [facility] LAB [place]   8/27/2020  1:00 PM Aryan Luciano NP [facility]

## 2019-11-05 RX ORDER — LAMOTRIGINE 300 MG/1
300 TABLET, EXTENDED RELEASE ORAL DAILY
Qty: 90 TABLET | Refills: 3 | Status: SHIPPED | OUTPATIENT
Start: 2019-11-05 | End: 2020-10-07

## 2019-11-08 ENCOUNTER — HOSPITAL ENCOUNTER (OUTPATIENT)
Dept: RADIOLOGY | Facility: HOSPITAL | Age: 64
Discharge: HOME | End: 2019-11-08
Attending: INTERNAL MEDICINE
Payer: MEDICARE

## 2019-11-08 DIAGNOSIS — Z78.0 ASYMPTOMATIC MENOPAUSAL STATE: ICD-10-CM

## 2019-11-08 PROCEDURE — 77080 DXA BONE DENSITY AXIAL: CPT | Mod: 26,,, | Performed by: RADIOLOGY

## 2019-11-08 PROCEDURE — 77080 DEXA BONE DENSITY SPINE HIP: ICD-10-PCS | Mod: 26,,, | Performed by: RADIOLOGY

## 2019-11-08 PROCEDURE — 77080 DXA BONE DENSITY AXIAL: CPT | Mod: TC,PO

## 2019-11-11 ENCOUNTER — OFFICE VISIT (OUTPATIENT)
Dept: FAMILY MEDICINE | Facility: CLINIC | Age: 64
End: 2019-11-11
Payer: MEDICARE

## 2019-11-11 VITALS
HEART RATE: 64 BPM | WEIGHT: 128.88 LBS | DIASTOLIC BLOOD PRESSURE: 64 MMHG | SYSTOLIC BLOOD PRESSURE: 132 MMHG | BODY MASS INDEX: 22.84 KG/M2 | RESPIRATION RATE: 18 BRPM | OXYGEN SATURATION: 98 % | TEMPERATURE: 99 F | HEIGHT: 63 IN

## 2019-11-11 DIAGNOSIS — F33.0 MAJOR DEPRESSIVE DISORDER, RECURRENT EPISODE, MILD: ICD-10-CM

## 2019-11-11 DIAGNOSIS — Z85.72 HISTORY OF LYMPHOMA: ICD-10-CM

## 2019-11-11 DIAGNOSIS — K76.0 NAFLD (NONALCOHOLIC FATTY LIVER DISEASE): ICD-10-CM

## 2019-11-11 DIAGNOSIS — G40.909 SEIZURE DISORDER: ICD-10-CM

## 2019-11-11 DIAGNOSIS — M19.041 PRIMARY OSTEOARTHRITIS OF BOTH HANDS: ICD-10-CM

## 2019-11-11 DIAGNOSIS — Z23 NEED FOR IMMUNIZATION AGAINST INFLUENZA: ICD-10-CM

## 2019-11-11 DIAGNOSIS — F41.1 GAD (GENERALIZED ANXIETY DISORDER): ICD-10-CM

## 2019-11-11 DIAGNOSIS — E78.5 HYPERLIPIDEMIA, UNSPECIFIED HYPERLIPIDEMIA TYPE: ICD-10-CM

## 2019-11-11 DIAGNOSIS — M19.042 PRIMARY OSTEOARTHRITIS OF BOTH HANDS: ICD-10-CM

## 2019-11-11 DIAGNOSIS — I10 ESSENTIAL HYPERTENSION: Primary | ICD-10-CM

## 2019-11-11 PROCEDURE — 90662 FLU VACCINE - HIGH DOSE (65+) PRESERVATIVE FREE IM: ICD-10-PCS | Mod: S$GLB,,, | Performed by: INTERNAL MEDICINE

## 2019-11-11 PROCEDURE — G0008 FLU VACCINE - HIGH DOSE (65+) PRESERVATIVE FREE IM: ICD-10-PCS | Mod: S$GLB,,, | Performed by: INTERNAL MEDICINE

## 2019-11-11 PROCEDURE — 3075F PR MOST RECENT SYSTOLIC BLOOD PRESS GE 130-139MM HG: ICD-10-PCS | Mod: S$GLB,,, | Performed by: INTERNAL MEDICINE

## 2019-11-11 PROCEDURE — 3008F BODY MASS INDEX DOCD: CPT | Mod: S$GLB,,, | Performed by: INTERNAL MEDICINE

## 2019-11-11 PROCEDURE — G0008 ADMIN INFLUENZA VIRUS VAC: HCPCS | Mod: S$GLB,,, | Performed by: INTERNAL MEDICINE

## 2019-11-11 PROCEDURE — 3075F SYST BP GE 130 - 139MM HG: CPT | Mod: S$GLB,,, | Performed by: INTERNAL MEDICINE

## 2019-11-11 PROCEDURE — 3078F DIAST BP <80 MM HG: CPT | Mod: S$GLB,,, | Performed by: INTERNAL MEDICINE

## 2019-11-11 PROCEDURE — 90662 IIV NO PRSV INCREASED AG IM: CPT | Mod: S$GLB,,, | Performed by: INTERNAL MEDICINE

## 2019-11-11 PROCEDURE — 3008F PR BODY MASS INDEX (BMI) DOCUMENTED: ICD-10-PCS | Mod: S$GLB,,, | Performed by: INTERNAL MEDICINE

## 2019-11-11 PROCEDURE — 1101F PT FALLS ASSESS-DOCD LE1/YR: CPT | Mod: S$GLB,,, | Performed by: INTERNAL MEDICINE

## 2019-11-11 PROCEDURE — 99214 OFFICE O/P EST MOD 30 MIN: CPT | Mod: 25,S$GLB,, | Performed by: INTERNAL MEDICINE

## 2019-11-11 PROCEDURE — 99214 PR OFFICE/OUTPT VISIT, EST, LEVL IV, 30-39 MIN: ICD-10-PCS | Mod: 25,S$GLB,, | Performed by: INTERNAL MEDICINE

## 2019-11-11 PROCEDURE — 1101F PR PT FALLS ASSESS DOC 0-1 FALLS W/OUT INJ PAST YR: ICD-10-PCS | Mod: S$GLB,,, | Performed by: INTERNAL MEDICINE

## 2019-11-11 PROCEDURE — 3078F PR MOST RECENT DIASTOLIC BLOOD PRESSURE < 80 MM HG: ICD-10-PCS | Mod: S$GLB,,, | Performed by: INTERNAL MEDICINE

## 2019-11-11 NOTE — PROGRESS NOTES
Subjective:       Patient ID: Keena Banks is a 64 y.o. female.    Medication List with Changes/Refills   Current Medications    FLUTICASONE (FLONASE) 50 MCG/ACTUATION NASAL SPRAY    2 sprays by Each Nare route once daily.    FOLIC ACID (FOLVITE) 1 MG TABLET    Take 1 mg by mouth once daily.      HYDROCHLOROTHIAZIDE (HYDRODIURIL) 25 MG TABLET    Take 1 tablet (25 mg total) by mouth once daily.    IBUPROFEN (ADVIL,MOTRIN) 200 MG TABLET    Take 400 mg by mouth every 6 (six) hours as needed for Pain.    LAMOTRIGINE XR (LAMICTAL XR) 300 MG XR TABLET    Take 1 tablet (300 mg total) by mouth once daily.    LOSARTAN (COZAAR) 50 MG TABLET    Take 1 tablet (50 mg total) by mouth once daily.    MELOXICAM (MOBIC) 7.5 MG TABLET    Take 7.5 mg by mouth daily as needed for Pain.    MULTIVITAMIN WITH MINERALS TABLET    Take 1 tablet by mouth once daily.      ROSUVASTATIN (CRESTOR) 20 MG TABLET    Take 1 tablet (20 mg total) by mouth once daily.    VENLAFAXINE (EFFEXOR-XR) 150 MG CP24    Take 1 capsule (150 mg total) by mouth once daily.    VENLAFAXINE (EFFEXOR-XR) 75 MG 24 HR CAPSULE    Take 1 capsule (75 mg total) by mouth once daily. To take in addition to the 150 mg dose for a total of 225 mg qday       Chief Complaint: Hypertension (6 month follow up, had test done)  She is here today to f/u on her chronic medical issues.      She has hypertension and is taking HCTZ 25 mg qday and losartan 50 mg qday. She does not check her BP at home but at her recent doctor's appt her BP has been controlled.  She has no known CAD. She denies chest pain or shortness of breath.      She has hyperlipidemia controlled on crestor 20 mg qday. Her last lipids were done on 5/2019 were 183/55/95/77. She is taking aspirin daily.      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 She is taking lamictal  mg qday. She is doing very well. She has not had a seizure for over 5 years. She does  say the lamictal affects her memory but no other side effects. She follows with neurology annually and was last seen on 10/2019.  She had neuropsych testing on 7/2018 which showed no memory impairment but mild depression      She had an ultrasound showing fatty infiltration of her liver. She has normal LFTs.       She had depression with anxiety and is taking venlafaxine 225 mg qday (150 +75 mg tablet daily).  At her last appt her dose was increased and she is doing better. She is sleeping better and is less depressed.  She is taking care of her granddaughter who is troubled due to the loss of her mother last year.   No suicidal ideations. No sleep issues.       She has a history of NHL(stage III A diffuse large B cell lymphoma) s/p 8 cycles of chemotherapy with CHOP/rituxan. She was  seen by oncology annual and her last appt was on 8/2019.  She continues to be in remission and will f/u in one year.    She has continues pain and arthritis in her hands. She was seen by rheumatology on 7/2019 and dx with erosive ostearthritis.   She was given mobic 7.5 mg qday to use as needed.       She lives with her  and granddaughter.  She is retired and very happy with this decision.  She does walk 3 times a week but does not exercise. She tries to eat healthy.      Colonoscopy---8/2016 repeat in 8 years  Mammogram----5/2019 neg  DEXA-----11/2019 osteopenia with fx risk of 6.9 %, hip fx risk of 0.3 % (Tv -1.1, Tf -0.2)  Pap-----once since [facility] neg  Tdap---7/2008  Influenza vaccine----due  Pneumovax 23----11/2015  Prevnar 13----12/2016  Shingles vaccine-----11/2015, shingrex 6/2018     Review of Systems   Constitutional: Negative for appetite change, fatigue, fever and unexpected weight change.   HENT: Negative for congestion, ear pain, hearing loss, sore throat and trouble swallowing.    Eyes: Negative for pain and visual disturbance.   Respiratory: Negative for cough, chest tightness, shortness of breath and wheezing.   "  Cardiovascular: Negative for chest pain, palpitations and leg swelling.   Gastrointestinal: Negative for abdominal pain, blood in stool, constipation, diarrhea, nausea and vomiting.   Endocrine: Negative for polyuria.   Genitourinary: Negative for dysuria and hematuria.   Musculoskeletal: Positive for arthralgias. Negative for back pain and myalgias.   Skin: Negative for rash.   Allergic/Immunologic: Positive for environmental allergies.   Neurological: Negative for dizziness, weakness, numbness and headaches.   Hematological: Does not bruise/bleed easily.   Psychiatric/Behavioral: Positive for dysphoric mood. Negative for sleep disturbance and suicidal ideas. The patient is not nervous/anxious.        Objective:      Vitals:    11/11/19 0816   BP: 132/64   Pulse: 64   Resp: 18   Temp: 98.9 °F (37.2 °C)   TempSrc: Oral   SpO2: 98%   Weight: 58.4 kg (128 lb 13.7 oz)   Height: 5' 3" (1.6 m)     Body mass index is 22.83 kg/m².  Physical Exam    General appearance: No acute distress, cooperative  Eyes: PERRL, EOMI, conjunctiva clear  HEENT: ears normal, nose without rhinorrhea, normal turbinates,mouth no sores or lesions, throat no erythema or pus  Neck: FROM, soft, supple, no thyromegaly, no bruits  Lymph: no anterior or posterior cervical adenopathy  Heart::  Regular rate and rhythm, no murmur  Lung: Clear to ascultation bilaterally, no wheezing, no rales, no rhonchi, no distress  Abdomen: Soft, nontender, no distention, no hepatosplenomegaly, bowel sounds normal, no guarding, no rebound, no peritoneal signs  Skin: no rashes, no lesions  Extremities: no edema, no cyanosis  Neuro: no focal abnormalities, strength 5/5 b/l UE and LE, 2+ DTRs b/l UE and LE, normal gait  Peripheral pulses: 2+ pedal pulses bilaterally, good perfusion and color  Musculoskeletal: FROM, good strenth, no tenderness  Joint: normal appearance, no swelling, no warmth, no deformity in all joints    Assessment:       1. Essential hypertension  "   2. Hyperlipidemia, unspecified hyperlipidemia type    3. NAFLD (nonalcoholic fatty liver disease)    4. Major depressive disorder, recurrent episode, mild    5. SORAYA (generalized anxiety disorder)    6. Seizure disorder    7. History of lymphoma    8. Primary osteoarthritis of both hands    9. Need for immunization against influenza        Plan:       Essential hypertension  Good control on this regimen. She is due for labs prior to her next appt.   -     CBC auto differential; Future; Expected date: 04/01/2020  -     Comprehensive metabolic panel; Future; Expected date: 04/01/2020  -     Lipid panel; Future; Expected date: 04/01/2020  -     TSH; Future; Expected date: 04/01/2020    Hyperlipidemia, unspecified hyperlipidemia type  Good control on atorvastatin.     NAFLD (nonalcoholic fatty liver disease)  Stable and continue to follow.     Major depressive disorder, recurrent episode, mild  Good control on the higher dose of venlafaxine.     SORAYA (generalized anxiety disorder)  Stable.     Seizure disorder  STable and doing well on lamictal. She follows with neurology annually.     History of lymphoma  No active disease.  She follows annually with oncology.     Primary osteoarthritis of both hands  Dx with erosive OA by rheumatology and uses mobic PRN.     Need for immunization against influenza  -     Influenza - High Dose (65+) (PF) (IM)    Follow up in about 6 months (around 5/11/2020) for chronic medical issues.

## 2019-11-19 ENCOUNTER — TELEPHONE (OUTPATIENT)
Dept: FAMILY MEDICINE | Facility: CLINIC | Age: 64
End: 2019-11-19

## 2019-11-19 NOTE — TELEPHONE ENCOUNTER
----- Message from Sandra Breaux sent at 11/19/2019  3:47 PM CST -----  Type: Needs Medical Advice    Who Called:  Dario sheffield   Best Call Back Number: [phone]  Additional Information: caller is requesting to verify what prescription patient is currently taking, please contact to advise.

## 2019-11-19 NOTE — TELEPHONE ENCOUNTER
Verified with [employer] that current medication list up to date for insurance purposes, will be sent to patient for her records.

## 2019-12-03 ENCOUNTER — PES CALL (OUTPATIENT)
Dept: ADMINISTRATIVE | Facility: CLINIC | Age: 64
End: 2019-12-03

## 2020-05-11 ENCOUNTER — OFFICE VISIT (OUTPATIENT)
Dept: FAMILY MEDICINE | Facility: CLINIC | Age: 65
End: 2020-05-11
Payer: MEDICARE

## 2020-05-11 VITALS
HEIGHT: 62 IN | OXYGEN SATURATION: 97 % | RESPIRATION RATE: 16 BRPM | BODY MASS INDEX: 23.43 KG/M2 | SYSTOLIC BLOOD PRESSURE: 134 MMHG | WEIGHT: 127.31 LBS | HEART RATE: 83 BPM | TEMPERATURE: 98 F | DIASTOLIC BLOOD PRESSURE: 68 MMHG

## 2020-05-11 DIAGNOSIS — M19.041 PRIMARY OSTEOARTHRITIS OF BOTH HANDS: ICD-10-CM

## 2020-05-11 DIAGNOSIS — K76.0 NAFLD (NONALCOHOLIC FATTY LIVER DISEASE): ICD-10-CM

## 2020-05-11 DIAGNOSIS — E78.5 HYPERLIPIDEMIA, UNSPECIFIED HYPERLIPIDEMIA TYPE: ICD-10-CM

## 2020-05-11 DIAGNOSIS — G40.909 SEIZURE DISORDER: ICD-10-CM

## 2020-05-11 DIAGNOSIS — M19.042 PRIMARY OSTEOARTHRITIS OF BOTH HANDS: ICD-10-CM

## 2020-05-11 DIAGNOSIS — Z12.39 ENCOUNTER FOR BREAST CANCER SCREENING OTHER THAN MAMMOGRAM: ICD-10-CM

## 2020-05-11 DIAGNOSIS — I10 ESSENTIAL HYPERTENSION: Primary | ICD-10-CM

## 2020-05-11 DIAGNOSIS — Z23 NEED FOR SHINGLES VACCINE: ICD-10-CM

## 2020-05-11 DIAGNOSIS — F33.0 MAJOR DEPRESSIVE DISORDER, RECURRENT EPISODE, MILD: ICD-10-CM

## 2020-05-11 DIAGNOSIS — Z85.72 HISTORY OF LYMPHOMA: ICD-10-CM

## 2020-05-11 DIAGNOSIS — F41.1 GAD (GENERALIZED ANXIETY DISORDER): ICD-10-CM

## 2020-05-11 PROCEDURE — 1126F AMNT PAIN NOTED NONE PRSNT: CPT | Mod: S$GLB,,, | Performed by: INTERNAL MEDICINE

## 2020-05-11 PROCEDURE — 80061 LIPID PANEL: CPT

## 2020-05-11 PROCEDURE — 85025 COMPLETE CBC W/AUTO DIFF WBC: CPT

## 2020-05-11 PROCEDURE — 1101F PT FALLS ASSESS-DOCD LE1/YR: CPT | Mod: S$GLB,,, | Performed by: INTERNAL MEDICINE

## 2020-05-11 PROCEDURE — 84443 ASSAY THYROID STIM HORMONE: CPT

## 2020-05-11 PROCEDURE — 1159F MED LIST DOCD IN RCRD: CPT | Mod: S$GLB,,, | Performed by: INTERNAL MEDICINE

## 2020-05-11 PROCEDURE — 36415 COLL VENOUS BLD VENIPUNCTURE: CPT | Mod: S$GLB,,, | Performed by: INTERNAL MEDICINE

## 2020-05-11 PROCEDURE — 80053 COMPREHEN METABOLIC PANEL: CPT

## 2020-05-11 PROCEDURE — 99214 PR OFFICE/OUTPT VISIT, EST, LEVL IV, 30-39 MIN: ICD-10-PCS | Mod: S$GLB,,, | Performed by: INTERNAL MEDICINE

## 2020-05-11 PROCEDURE — 36415 PR COLLECTION VENOUS BLOOD,VENIPUNCTURE: ICD-10-PCS | Mod: S$GLB,,, | Performed by: INTERNAL MEDICINE

## 2020-05-11 PROCEDURE — 3075F PR MOST RECENT SYSTOLIC BLOOD PRESS GE 130-139MM HG: ICD-10-PCS | Mod: S$GLB,,, | Performed by: INTERNAL MEDICINE

## 2020-05-11 PROCEDURE — 3075F SYST BP GE 130 - 139MM HG: CPT | Mod: S$GLB,,, | Performed by: INTERNAL MEDICINE

## 2020-05-11 PROCEDURE — 3078F DIAST BP <80 MM HG: CPT | Mod: S$GLB,,, | Performed by: INTERNAL MEDICINE

## 2020-05-11 PROCEDURE — 3078F PR MOST RECENT DIASTOLIC BLOOD PRESSURE < 80 MM HG: ICD-10-PCS | Mod: S$GLB,,, | Performed by: INTERNAL MEDICINE

## 2020-05-11 PROCEDURE — 99214 OFFICE O/P EST MOD 30 MIN: CPT | Mod: S$GLB,,, | Performed by: INTERNAL MEDICINE

## 2020-05-11 PROCEDURE — 1126F PR PAIN SEVERITY QUANTIFIED, NO PAIN PRESENT: ICD-10-PCS | Mod: S$GLB,,, | Performed by: INTERNAL MEDICINE

## 2020-05-11 PROCEDURE — 1159F PR MEDICATION LIST DOCUMENTED IN MEDICAL RECORD: ICD-10-PCS | Mod: S$GLB,,, | Performed by: INTERNAL MEDICINE

## 2020-05-11 PROCEDURE — 1101F PR PT FALLS ASSESS DOC 0-1 FALLS W/OUT INJ PAST YR: ICD-10-PCS | Mod: S$GLB,,, | Performed by: INTERNAL MEDICINE

## 2020-05-11 NOTE — PROGRESS NOTES
Subjective:       Patient ID: Keena Banks is a 65 y.o. female.    Medication List with Changes/Refills   New Medications    VARICELLA-ZOSTER [id number], PF, (SHINGRIX, PF,) 50 MCG/0.5 ML INJECTION    Inject 0.5 mLs into the muscle once. for 1 dose   Current Medications    FLUTICASONE (FLONASE) 50 MCG/ACTUATION NASAL SPRAY    2 sprays by Each Nare route once daily.    HYDROCHLOROTHIAZIDE (HYDRODIURIL) 25 MG TABLET    Take 1 tablet (25 mg total) by mouth once daily.    IBUPROFEN (ADVIL,MOTRIN) 200 MG TABLET    Take 400 mg by mouth every 6 (six) hours as needed for Pain.    LAMOTRIGINE XR (LAMICTAL XR) 300 MG XR TABLET    Take 1 tablet (300 mg total) by mouth once daily.    LOSARTAN (COZAAR) 50 MG TABLET    Take 1 tablet (50 mg total) by mouth once daily.    MELOXICAM (MOBIC) 7.5 MG TABLET    Take 7.5 mg by mouth daily as needed for Pain.    MULTIVITAMIN WITH MINERALS TABLET    Take 1 tablet by mouth once daily.      ROSUVASTATIN (CRESTOR) 20 MG TABLET    Take 1 tablet (20 mg total) by mouth once daily.    VENLAFAXINE (EFFEXOR-XR) 150 MG CP24    Take 1 capsule (150 mg total) by mouth once daily.    VENLAFAXINE (EFFEXOR-XR) 75 MG 24 HR CAPSULE    Take 1 capsule (75 mg total) by mouth once daily. To take in addition to the 150 mg dose for a total of 225 mg qday   Discontinued Medications    FOLIC ACID (FOLVITE) 1 MG TABLET    Take 1 mg by mouth once daily.         Chief Complaint: Follow-up  She is here today to f/u on her chronic medical issues.      She has hypertension and is taking HCTZ 25 mg qday and losartan 50 mg qday. She has no known CAD. She denies chest pain or shortness of breath.      She has hyperlipidemia controlled on crestor 20 mg qday. Her last lipids were done on 5/2019 were 183/55/95/77. She is taking aspirin daily.      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 She is taking lamictal  mg qday. She is doing very well. She has not had  a seizure for over 5 years. She does say the lamictal affects her memory but no other side effects. She follows with neurology annually and was last seen on 10/2019.  She had neuropsych testing on 7/2018 which showed no memory impairment but mild depression      She had an ultrasound showing fatty infiltration of her liver. She has normal LFTs.       She had depression with anxiety and is taking venlafaxine 225 mg qday (150 +75 mg tablet daily).  She feels this is well controlled.  She is sleeping well. She denies any anxiety symptoms.No suicidal ideations  She is taking care of her granddaughter who is troubled due to the loss of her mother. .     She has a history of NHL(stage III A diffuse large B cell lymphoma) s/p 8 cycles of chemotherapy with CHOP/rituxan. She was  seen by oncology annual and her last appt was on 8/2019.  She continues to be in remission and will f/u in one year.     She has continues pain and arthritis in her hands. She was seen by rheumatology on 7/2019 and dx with erosive ostearthritis.   She was given mobic 7.5 mg qday to use as needed and feels this helps control the pain in her hands.  Hand hurt worse after being active with things like gardening.       She lives with her  and granddaughter.  She is retired and very happy with this decision.  She does walk 3 times a week but does not exercise. She tries to eat healthy.      Colonoscopy---8/2016 repeat in 8 years  Mammogram----5/2019 neg  DEXA-----11/2019 osteopenia with fx risk of 6.9 %, hip fx risk of 0.3 % (Tv -1.1, Tf -0.2)  Pap-----once since [facility] neg  Tdap---7/2008  Influenza vaccine----11/2019  Pneumovax 23----11/2015  Prevnar 13----12/2016  Shingles vaccine-----11/2015  Shingrex 6/2018      Review of Systems   Constitutional: Negative for appetite change, fatigue, fever and unexpected weight change.   HENT: Negative for congestion, ear pain, hearing loss, sore throat and trouble swallowing.    Eyes: Negative for pain and  "visual disturbance.   Respiratory: Negative for cough, chest tightness, shortness of breath and wheezing.    Cardiovascular: Negative for chest pain, palpitations and leg swelling.   Gastrointestinal: Negative for abdominal pain, blood in stool, constipation, diarrhea, nausea and vomiting.   Endocrine: Negative for polyuria.   Genitourinary: Negative for dysuria and hematuria.   Musculoskeletal: Positive for arthralgias. Negative for back pain and myalgias.   Skin: Negative for rash.   Neurological: Negative for dizziness, weakness, numbness and headaches.   Hematological: Does not bruise/bleed easily.   Psychiatric/Behavioral: Negative for dysphoric mood, sleep disturbance and suicidal ideas. The patient is not nervous/anxious.        Objective:      Vitals:    05/11/20 0806   BP: 134/68   BP Location: Right arm   Patient Position: Sitting   Pulse: 83   Resp: 16   Temp: 98.4 °F (36.9 °C)   TempSrc: Oral   SpO2: 97%   Weight: 57.7 kg (127 lb 5.1 oz)   Height: 5' 2" (1.575 m)     Body mass index is 23.29 kg/m².  Physical Exam    General appearance: No acute distress, cooperative  Eyes: PERRL, EOMI, conjunctiva clear  Neck: FROM, soft, supple, no thyromegaly, no bruits  Lymph: no anterior or posterior cervical adenopathy  Heart::  Regular rate and rhythm, no murmur  Lung: Clear to ascultation bilaterally, no wheezing, no rales, no rhonchi, no distress  Abdomen: Soft, nontender, no distention, no hepatosplenomegaly, bowel sounds normal, no guarding, no rebound, no peritoneal signs  Skin: no rashes, no lesions  Extremities: no edema, no cyanosis  Neuro: no focal abnormalities, strength 5/5 b/l UE and LE, 2+ DTRs b/l UE and LE, normal gait  Peripheral pulses: 2+ pedal pulses bilaterally, good perfusion and color  Musculoskeletal: FROM, good strenth, no tenderness  Joint: normal appearance, no swelling, no warmth, hands with deformity at PIP joints bilateral hands with swelling but no erythema    Assessment:       1. " Essential hypertension    2. Hyperlipidemia, unspecified hyperlipidemia type    3. NAFLD (nonalcoholic fatty liver disease)    4. Major depressive disorder, recurrent episode, mild    5. SORAYA (generalized anxiety disorder)    6. Seizure disorder    7. History of lymphoma    8. Primary osteoarthritis of both hands    9. Need for shingles vaccine    10. Encounter for breast cancer screening other than mammogram        Plan:       Essential hypertension  Well controlled and continue current regimen.  She is due for labs today.   -     CBC auto differential  -     Comprehensive metabolic panel  -     TSH  -     Lipid Panel    Hyperlipidemia, unspecified hyperlipidemia type  Good control on crestor.     NAFLD (nonalcoholic fatty liver disease)  Normal LFTs and continue to follow.     Major depressive disorder, recurrent episode, mild  Controlled on effexor.     SORAYA (generalized anxiety disorder)  STable and no active symptoms.     Seizure disorder  Well controlled on lamictal.     History of lymphoma  No active disease.     Primary osteoarthritis of both hands  Good relief from meloxicam.     Need for shingles vaccine  -     varicella-zoster [id number], PF, (SHINGRIX, PF,) 50 mcg/0.5 mL injection; Inject 0.5 mLs into the muscle once. for 1 dose  Dispense: 0.5 mL; Refill: 0    Encounter for breast cancer screening other than mammogram  -     Mammo Digital Screening Bilat; Future; Expected date: 05/11/2020    Follow up in about 6 months (around 11/11/2020) for chronic medical issues.

## 2020-05-12 ENCOUNTER — TELEPHONE (OUTPATIENT)
Dept: FAMILY MEDICINE | Facility: CLINIC | Age: 65
End: 2020-05-12

## 2020-05-12 LAB
ALBUMIN SERPL BCP-MCNC: 4.2 G/DL (ref 3.5–5.2)
ALP SERPL-CCNC: 84 U/L (ref 55–135)
ALT SERPL W/O P-5'-P-CCNC: 26 U/L (ref 10–44)
ANION GAP SERPL CALC-SCNC: 8 MMOL/L (ref 8–16)
AST SERPL-CCNC: 34 U/L (ref 10–40)
BASOPHILS # BLD AUTO: 0.06 K/UL (ref 0–0.2)
BASOPHILS NFR BLD: 1.4 % (ref 0–1.9)
BILIRUB SERPL-MCNC: 0.7 MG/DL (ref 0.1–1)
BUN SERPL-MCNC: 18 MG/DL (ref 8–23)
CALCIUM SERPL-MCNC: 9.7 MG/DL (ref 8.7–10.5)
CHLORIDE SERPL-SCNC: 102 MMOL/L (ref 95–110)
CHOLEST SERPL-MCNC: 195 MG/DL (ref 120–199)
CHOLEST/HDLC SERPL: 2.2 {RATIO} (ref 2–5)
CO2 SERPL-SCNC: 30 MMOL/L (ref 23–29)
CREAT SERPL-MCNC: 0.8 MG/DL (ref 0.5–1.4)
DIFFERENTIAL METHOD: ABNORMAL
EOSINOPHIL # BLD AUTO: 0.1 K/UL (ref 0–0.5)
EOSINOPHIL NFR BLD: 3.1 % (ref 0–8)
ERYTHROCYTE [DISTWIDTH] IN BLOOD BY AUTOMATED COUNT: 12.1 % (ref 11.5–14.5)
EST. GFR  (AFRICAN AMERICAN): >60 ML/MIN/1.73 M^2
EST. GFR  (NON AFRICAN AMERICAN): >60 ML/MIN/1.73 M^2
GLUCOSE SERPL-MCNC: 99 MG/DL (ref 70–110)
HCT VFR BLD AUTO: 38.2 % (ref 37–48.5)
HDLC SERPL-MCNC: 88 MG/DL (ref 40–75)
HDLC SERPL: 45.1 % (ref 20–50)
HGB BLD-MCNC: 12.2 G/DL (ref 12–16)
IMM GRANULOCYTES # BLD AUTO: 0.02 K/UL (ref 0–0.04)
IMM GRANULOCYTES NFR BLD AUTO: 0.5 % (ref 0–0.5)
LDLC SERPL CALC-MCNC: 96 MG/DL (ref 63–159)
LYMPHOCYTES # BLD AUTO: 1.1 K/UL (ref 1–4.8)
LYMPHOCYTES NFR BLD: 25.7 % (ref 18–48)
MCH RBC QN AUTO: 30.1 PG (ref 27–31)
MCHC RBC AUTO-ENTMCNC: 31.9 G/DL (ref 32–36)
MCV RBC AUTO: 94 FL (ref 82–98)
MONOCYTES # BLD AUTO: 0.4 K/UL (ref 0.3–1)
MONOCYTES NFR BLD: 8.6 % (ref 4–15)
NEUTROPHILS # BLD AUTO: 2.6 K/UL (ref 1.8–7.7)
NEUTROPHILS NFR BLD: 60.7 % (ref 38–73)
NONHDLC SERPL-MCNC: 107 MG/DL
NRBC BLD-RTO: 0 /100 WBC
PLATELET # BLD AUTO: 209 K/UL (ref 150–350)
PMV BLD AUTO: 13.1 FL (ref 9.2–12.9)
POTASSIUM SERPL-SCNC: 3.6 MMOL/L (ref 3.5–5.1)
PROT SERPL-MCNC: 6.8 G/DL (ref 6–8.4)
RBC # BLD AUTO: 4.05 M/UL (ref 4–5.4)
SODIUM SERPL-SCNC: 140 MMOL/L (ref 136–145)
TRIGL SERPL-MCNC: 55 MG/DL (ref 30–150)
TSH SERPL DL<=0.005 MIU/L-ACNC: 1.92 UIU/ML (ref 0.4–4)
WBC # BLD AUTO: 4.2 K/UL (ref 3.9–12.7)

## 2020-05-12 NOTE — TELEPHONE ENCOUNTER
Please let her know that her labs look great.     Cholesterol is well controlled. Normal liver,kidney and thyroid.     Thanks

## 2020-05-22 RX ORDER — MELOXICAM 7.5 MG/1
TABLET ORAL
Qty: 90 TABLET | Refills: 3 | Status: SHIPPED | OUTPATIENT
Start: 2020-05-22 | End: 2021-02-03 | Stop reason: SDUPTHER

## 2020-06-04 ENCOUNTER — HOSPITAL ENCOUNTER (OUTPATIENT)
Dept: RADIOLOGY | Facility: HOSPITAL | Age: 65
Discharge: HOME | End: 2020-06-04
Attending: INTERNAL MEDICINE
Payer: MEDICARE

## 2020-06-04 DIAGNOSIS — Z12.39 ENCOUNTER FOR BREAST CANCER SCREENING OTHER THAN MAMMOGRAM: ICD-10-CM

## 2020-06-04 DIAGNOSIS — Z12.31 BREAST CANCER SCREENING BY MAMMOGRAM: ICD-10-CM

## 2020-06-04 PROCEDURE — 77063 BREAST TOMOSYNTHESIS BI: CPT | Mod: 26,,, | Performed by: RADIOLOGY

## 2020-06-04 PROCEDURE — 77067 SCR MAMMO BI INCL CAD: CPT | Mod: TC,PO

## 2020-06-04 PROCEDURE — 77067 MAMMO DIGITAL SCREENING BILAT WITH TOMOSYNTHESIS_CAD: ICD-10-PCS | Mod: 26,,, | Performed by: RADIOLOGY

## 2020-06-04 PROCEDURE — 77063 MAMMO DIGITAL SCREENING BILAT WITH TOMOSYNTHESIS_CAD: ICD-10-PCS | Mod: 26,,, | Performed by: RADIOLOGY

## 2020-06-04 PROCEDURE — 77067 SCR MAMMO BI INCL CAD: CPT | Mod: 26,,, | Performed by: RADIOLOGY

## 2020-08-24 ENCOUNTER — LAB VISIT (OUTPATIENT)
Dept: LAB | Facility: HOSPITAL | Age: 65
End: 2020-08-24
Attending: NURSE PRACTITIONER
Payer: MEDICARE

## 2020-08-24 DIAGNOSIS — C82.90 NHL (NODULAR HISTIOCYTIC LYMPHOMA): ICD-10-CM

## 2020-08-24 LAB
ALBUMIN SERPL BCP-MCNC: 4.4 G/DL (ref 3.5–5.2)
ALP SERPL-CCNC: 109 U/L (ref 55–135)
ALT SERPL W/O P-5'-P-CCNC: 23 U/L (ref 10–44)
ANION GAP SERPL CALC-SCNC: 12 MMOL/L (ref 8–16)
AST SERPL-CCNC: 26 U/L (ref 10–40)
B2 MICROGLOB SERPL-MCNC: 1.2 UG/ML (ref 0–2.5)
BASOPHILS # BLD AUTO: 0.07 K/UL (ref 0–0.2)
BASOPHILS NFR BLD: 1.3 % (ref 0–1.9)
BILIRUB SERPL-MCNC: 0.4 MG/DL (ref 0.1–1)
BUN SERPL-MCNC: 20 MG/DL (ref 8–23)
CALCIUM SERPL-MCNC: 9.7 MG/DL (ref 8.7–10.5)
CHLORIDE SERPL-SCNC: 100 MMOL/L (ref 95–110)
CO2 SERPL-SCNC: 29 MMOL/L (ref 23–29)
CREAT SERPL-MCNC: 1 MG/DL (ref 0.5–1.4)
DIFFERENTIAL METHOD: ABNORMAL
EOSINOPHIL # BLD AUTO: 0.3 K/UL (ref 0–0.5)
EOSINOPHIL NFR BLD: 6.1 % (ref 0–8)
ERYTHROCYTE [DISTWIDTH] IN BLOOD BY AUTOMATED COUNT: 12.5 % (ref 11.5–14.5)
EST. GFR  (AFRICAN AMERICAN): >60 ML/MIN/1.73 M^2
EST. GFR  (NON AFRICAN AMERICAN): 59 ML/MIN/1.73 M^2
GLUCOSE SERPL-MCNC: 104 MG/DL (ref 70–110)
HCT VFR BLD AUTO: 36.1 % (ref 37–48.5)
HGB BLD-MCNC: 12 G/DL (ref 12–16)
LDH SERPL L TO P-CCNC: 239 U/L (ref 110–260)
LYMPHOCYTES # BLD AUTO: 1.6 K/UL (ref 1–4.8)
LYMPHOCYTES NFR BLD: 30.1 % (ref 18–48)
MCH RBC QN AUTO: 29.9 PG (ref 27–31)
MCHC RBC AUTO-ENTMCNC: 33.2 G/DL (ref 32–36)
MCV RBC AUTO: 90 FL (ref 82–98)
MONOCYTES # BLD AUTO: 0.5 K/UL (ref 0.3–1)
MONOCYTES NFR BLD: 9.8 % (ref 4–15)
NEUTROPHILS # BLD AUTO: 2.9 K/UL (ref 1.8–7.7)
NEUTROPHILS NFR BLD: 52.7 % (ref 38–73)
PLATELET # BLD AUTO: 255 K/UL (ref 150–350)
PMV BLD AUTO: 9.8 FL (ref 9.2–12.9)
POTASSIUM SERPL-SCNC: 3.5 MMOL/L (ref 3.5–5.1)
PROT SERPL-MCNC: 7 G/DL (ref 6–8.4)
RBC # BLD AUTO: 4.02 M/UL (ref 4–5.4)
SODIUM SERPL-SCNC: 141 MMOL/L (ref 136–145)
WBC # BLD AUTO: 5.41 K/UL (ref 3.9–12.7)

## 2020-08-24 PROCEDURE — 36415 COLL VENOUS BLD VENIPUNCTURE: CPT | Mod: PO

## 2020-08-24 PROCEDURE — 83615 LACTATE (LD) (LDH) ENZYME: CPT | Mod: PO

## 2020-08-24 PROCEDURE — 80053 COMPREHEN METABOLIC PANEL: CPT | Mod: PO

## 2020-08-24 PROCEDURE — 85025 COMPLETE CBC W/AUTO DIFF WBC: CPT | Mod: PO

## 2020-08-24 PROCEDURE — 82232 ASSAY OF BETA-2 PROTEIN: CPT

## 2020-08-27 ENCOUNTER — TELEPHONE (OUTPATIENT)
Dept: HEMATOLOGY/ONCOLOGY | Facility: CLINIC | Age: 65
End: 2020-08-27

## 2020-08-27 NOTE — TELEPHONE ENCOUNTER
S/w pt.  Pt wants to r/s appt for today r/t weather.  Appt rescheduled and pt verbalized understanding and appreciation.

## 2020-08-27 NOTE — TELEPHONE ENCOUNTER
----- Message from Shakila Torres sent at 8/27/2020 10:38 AM CDT -----  Regarding: appointment  Contact: patient  Type:  Sooner Apoointment Request    Caller is requesting a sooner appointment.  Caller declined first available appointment listed below.  Caller will not accept being placed on the waitlist and is requesting a message be sent to doctor.    Name of Caller:  self  When is the first available appointment?  08/27/20  Symptoms:  year f/u  Best Call Back Number:  [phone] (home)   Additional Information:  Patient would like to reschedule her 1:00 appointment today due to the weather. Please call patient. Thanks!

## 2020-08-31 ENCOUNTER — OFFICE VISIT (OUTPATIENT)
Dept: HEMATOLOGY/ONCOLOGY | Facility: CLINIC | Age: 65
End: 2020-08-31
Payer: MEDICARE

## 2020-08-31 VITALS
HEIGHT: 63 IN | TEMPERATURE: 97 F | DIASTOLIC BLOOD PRESSURE: 68 MMHG | WEIGHT: 132.94 LBS | OXYGEN SATURATION: 95 % | BODY MASS INDEX: 23.55 KG/M2 | SYSTOLIC BLOOD PRESSURE: 112 MMHG | HEART RATE: 72 BPM | RESPIRATION RATE: 18 BRPM

## 2020-08-31 DIAGNOSIS — C82.90 NHL (NODULAR HISTIOCYTIC LYMPHOMA): Primary | ICD-10-CM

## 2020-08-31 PROCEDURE — 1101F PT FALLS ASSESS-DOCD LE1/YR: CPT | Mod: S$GLB,,, | Performed by: NURSE PRACTITIONER

## 2020-08-31 PROCEDURE — 3078F DIAST BP <80 MM HG: CPT | Mod: S$GLB,,, | Performed by: NURSE PRACTITIONER

## 2020-08-31 PROCEDURE — 1101F PR PT FALLS ASSESS DOC 0-1 FALLS W/OUT INJ PAST YR: ICD-10-PCS | Mod: S$GLB,,, | Performed by: NURSE PRACTITIONER

## 2020-08-31 PROCEDURE — 1159F PR MEDICATION LIST DOCUMENTED IN MEDICAL RECORD: ICD-10-PCS | Mod: S$GLB,,, | Performed by: NURSE PRACTITIONER

## 2020-08-31 PROCEDURE — 99999 PR PBB SHADOW E&M-EST. PATIENT-LVL IV: ICD-10-PCS | Mod: PBBFAC,,, | Performed by: NURSE PRACTITIONER

## 2020-08-31 PROCEDURE — 3078F PR MOST RECENT DIASTOLIC BLOOD PRESSURE < 80 MM HG: ICD-10-PCS | Mod: S$GLB,,, | Performed by: NURSE PRACTITIONER

## 2020-08-31 PROCEDURE — 1126F AMNT PAIN NOTED NONE PRSNT: CPT | Mod: S$GLB,,, | Performed by: NURSE PRACTITIONER

## 2020-08-31 PROCEDURE — 1126F PR PAIN SEVERITY QUANTIFIED, NO PAIN PRESENT: ICD-10-PCS | Mod: S$GLB,,, | Performed by: NURSE PRACTITIONER

## 2020-08-31 PROCEDURE — 99213 OFFICE O/P EST LOW 20 MIN: CPT | Mod: S$GLB,,, | Performed by: NURSE PRACTITIONER

## 2020-08-31 PROCEDURE — 3008F PR BODY MASS INDEX (BMI) DOCUMENTED: ICD-10-PCS | Mod: S$GLB,,, | Performed by: NURSE PRACTITIONER

## 2020-08-31 PROCEDURE — 1159F MED LIST DOCD IN RCRD: CPT | Mod: S$GLB,,, | Performed by: NURSE PRACTITIONER

## 2020-08-31 PROCEDURE — 3074F PR MOST RECENT SYSTOLIC BLOOD PRESSURE < 130 MM HG: ICD-10-PCS | Mod: S$GLB,,, | Performed by: NURSE PRACTITIONER

## 2020-08-31 PROCEDURE — 99999 PR PBB SHADOW E&M-EST. PATIENT-LVL IV: CPT | Mod: PBBFAC,,, | Performed by: NURSE PRACTITIONER

## 2020-08-31 PROCEDURE — 99213 PR OFFICE/OUTPT VISIT, EST, LEVL III, 20-29 MIN: ICD-10-PCS | Mod: S$GLB,,, | Performed by: NURSE PRACTITIONER

## 2020-08-31 PROCEDURE — 3074F SYST BP LT 130 MM HG: CPT | Mod: S$GLB,,, | Performed by: NURSE PRACTITIONER

## 2020-08-31 PROCEDURE — 3008F BODY MASS INDEX DOCD: CPT | Mod: S$GLB,,, | Performed by: NURSE PRACTITIONER

## 2020-08-31 NOTE — PROGRESS NOTES
HISTORY OF PRESENT ILLNESS:  This is a 65-year-old  female known to Dr. Campos for Stage IIIA diffuse large B-cell non-Hodgkin lymphoma that presented with adenopathy in the left supraclavicular region.  She was treated with 8 cycles of CHOP/Rituxan and went into complete remission.      She presents to the clinic today for her 96-month (8 yr) post-therapy evaluation.  She remains active and well.  She denies any recurrent illness, fevers, chills, drenching night sweats, painful lymphadenopathy, unexplained weight loss,   rashes, pruritus, abdominal discomfort/bloating, nausea, vomiting, constipation, diarrhea, irregular heartbeat, chest pain, bleeding, etc. No other new complaints or pertinent findings on 14-point review of systems.    PHYSICAL EXAMINATION:  GENERAL:  Well-developed, well-nourished white female in no acute distress.  Alert and oriented x3.  VITAL SIGNS:  Weight:  Loss of 1/2 pound in 1 year   Wt Readings from Last 3 Encounters:   08/31/20 60.3 kg (132 lb 15 oz)   05/11/20 57.7 kg (127 lb 5.1 oz)   11/11/19 58.4 kg (128 lb 13.7 oz)     Temp Readings from Last 3 Encounters:   08/31/20 97 °F (36.1 °C) (Temporal)   05/11/20 98.4 °F (36.9 °C) (Oral)   11/11/19 98.9 °F (37.2 °C) (Oral)     BP Readings from Last 3 Encounters:   08/31/20 112/68   05/11/20 134/68   11/11/19 132/64     Pulse Readings from Last 3 Encounters:   08/31/20 72   05/11/20 83   11/11/19 64     HEENT:  Normocephalic, atraumatic.  Oral mucosa pink and moist.  Lips without lesions.  Tongue midline.  Oropharynx clear.  Nonicteric sclerae.  NECK:  Supple, no adenopathy.  No carotid bruits, thyromegaly or thyroid nodule.  HEART:  Regular rate and rhythm without murmur, gallop or rub.  LUNGS:  Clear to auscultation bilaterally.  Normal respiratory effort.  ABDOMEN:  Soft, nontender, nondistended with positive normoactive bowel sounds, no hepatosplenomegaly.  EXTREMITIES:  No cyanosis, clubbing or edema.  Distal pulses are  intact.  AXILLAE AND GROIN:  No palpable pathologic lymphadenopathy is appreciated.  SKIN:  Intact/turgor normal.  NEUROLOGIC:  Cranial nerves II-XII grossly intact.  Motor:  Good muscle bulk and tone.  Strength/sensory 5/5 throughout.  Gait stable.    LABORATORY:    Lab Results   Component Value Date    WBC 5.41 08/24/2020    RBC 4.02 08/24/2020    HGB 12.0 08/24/2020    HCT 36.1 (L) 08/24/2020    MCV 90 08/24/2020    MCH 29.9 08/24/2020    MCHC 33.2 08/24/2020    RDW 12.5 08/24/2020     08/24/2020    MPV 9.8 08/24/2020    GRAN 2.9 08/24/2020    GRAN 52.7 08/24/2020    LYMPH 1.6 08/24/2020    LYMPH 30.1 08/24/2020    MONO 0.5 08/24/2020    MONO 9.8 08/24/2020    EOS 0.3 08/24/2020    BASO 0.07 08/24/2020    EOSINOPHIL 6.1 08/24/2020    BASOPHIL 1.3 08/24/2020     CMP  Sodium   Date Value Ref Range Status   08/24/2020 141 136 - 145 mmol/L Final     Potassium   Date Value Ref Range Status   08/24/2020 3.5 3.5 - 5.1 mmol/L Final     Chloride   Date Value Ref Range Status   08/24/2020 100 95 - 110 mmol/L Final     CO2   Date Value Ref Range Status   08/24/2020 29 23 - 29 mmol/L Final     Glucose   Date Value Ref Range Status   08/24/2020 104 70 - 110 mg/dL Final     BUN, Bld   Date Value Ref Range Status   08/24/2020 20 8 - 23 mg/dL Final     Creatinine   Date Value Ref Range Status   08/24/2020 1.0 0.5 - 1.4 mg/dL Final     Calcium   Date Value Ref Range Status   08/24/2020 9.7 8.7 - 10.5 mg/dL Final     Total Protein   Date Value Ref Range Status   08/24/2020 7.0 6.0 - 8.4 g/dL Final     Albumin   Date Value Ref Range Status   08/24/2020 4.4 3.5 - 5.2 g/dL Final     Total Bilirubin   Date Value Ref Range Status   08/24/2020 0.4 0.1 - 1.0 mg/dL Final     Comment:     For infants and newborns, interpretation of results should be based  on gestational age, weight and in agreement with clinical  observations.  Premature Infant recommended reference ranges:  Up to 24 hours.............<8.0 mg/dL  Up to 48  hours............<12.0 mg/dL  3-5 days..................<15.0 mg/dL  6-29 days.................<15.0 mg/dL       Alkaline Phosphatase   Date Value Ref Range Status   08/24/2020 109 55 - 135 U/L Final     AST   Date Value Ref Range Status   08/24/2020 26 10 - 40 U/L Final     ALT   Date Value Ref Range Status   08/24/2020 23 10 - 44 U/L Final     Anion Gap   Date Value Ref Range Status   08/24/2020 12 8 - 16 mmol/L Final     eGFR if    Date Value Ref Range Status   08/24/2020 >60 >60 mL/min/1.73 m^2 Final     eGFR if non    Date Value Ref Range Status   08/24/2020 59 (A) >60 mL/min/1.73 m^2 Final     Comment:     Calculation used to obtain the estimated glomerular filtration  rate (eGFR) is the CKD-EPI equation.        ,   [id number] 1.2    IMPRESSION:  1.  Stage IIIA diffuse large B-cell non-Hodgkin lymphoma - MUNIR.  2.  Idiopathic seizure disorder - stable.  3.  Elevated liver function test - fatty liver, remains stable.  4.  Skin cancer - follows Dr. Mena     PLAN: Return in one year with interval CBC, CMP, LDH, and beta-2 microglobulin for annual surveillance.    Assessment/plan reviewed and approved by Dr. Campos.

## 2020-11-11 ENCOUNTER — HOSPITAL ENCOUNTER (OUTPATIENT)
Dept: RADIOLOGY | Facility: HOSPITAL | Age: 65
Discharge: HOME | End: 2020-11-11
Attending: INTERNAL MEDICINE
Payer: MEDICARE

## 2020-11-11 ENCOUNTER — OFFICE VISIT (OUTPATIENT)
Dept: FAMILY MEDICINE | Facility: CLINIC | Age: 65
End: 2020-11-11
Payer: MEDICARE

## 2020-11-11 VITALS
TEMPERATURE: 98 F | DIASTOLIC BLOOD PRESSURE: 70 MMHG | SYSTOLIC BLOOD PRESSURE: 124 MMHG | BODY MASS INDEX: 23.73 KG/M2 | WEIGHT: 133.94 LBS | HEART RATE: 64 BPM | HEIGHT: 63 IN | RESPIRATION RATE: 18 BRPM

## 2020-11-11 DIAGNOSIS — K76.0 NAFLD (NONALCOHOLIC FATTY LIVER DISEASE): ICD-10-CM

## 2020-11-11 DIAGNOSIS — M25.551 RIGHT HIP PAIN: ICD-10-CM

## 2020-11-11 DIAGNOSIS — G40.909 SEIZURE DISORDER: ICD-10-CM

## 2020-11-11 DIAGNOSIS — Z85.72 HISTORY OF LYMPHOMA: ICD-10-CM

## 2020-11-11 DIAGNOSIS — Z01.84 ENCOUNTER FOR ANTIBODY RESPONSE EXAMINATION: ICD-10-CM

## 2020-11-11 DIAGNOSIS — M19.042 PRIMARY OSTEOARTHRITIS OF BOTH HANDS: ICD-10-CM

## 2020-11-11 DIAGNOSIS — Z86.16 HISTORY OF 2019 NOVEL CORONAVIRUS DISEASE (COVID-19): ICD-10-CM

## 2020-11-11 DIAGNOSIS — M19.041 PRIMARY OSTEOARTHRITIS OF BOTH HANDS: ICD-10-CM

## 2020-11-11 DIAGNOSIS — F33.0 MAJOR DEPRESSIVE DISORDER, RECURRENT EPISODE, MILD: ICD-10-CM

## 2020-11-11 DIAGNOSIS — M19.049 HAND ARTHRITIS: ICD-10-CM

## 2020-11-11 DIAGNOSIS — F41.1 GAD (GENERALIZED ANXIETY DISORDER): ICD-10-CM

## 2020-11-11 DIAGNOSIS — I10 ESSENTIAL HYPERTENSION: Primary | ICD-10-CM

## 2020-11-11 DIAGNOSIS — E78.5 HYPERLIPIDEMIA, UNSPECIFIED HYPERLIPIDEMIA TYPE: ICD-10-CM

## 2020-11-11 LAB
CRP SERPL-MCNC: 0.5 MG/L (ref 0–8.2)
ERYTHROCYTE [SEDIMENTATION RATE] IN BLOOD BY WESTERGREN METHOD: 11 MM/HR (ref 0–36)
RHEUMATOID FACT SERPL-ACNC: 12 IU/ML (ref 0–15)
SARS-COV-2 IGG SERPLBLD QL IA.RAPID: POSITIVE

## 2020-11-11 PROCEDURE — 99214 PR OFFICE/OUTPT VISIT, EST, LEVL IV, 30-39 MIN: ICD-10-PCS | Mod: S$GLB,,, | Performed by: INTERNAL MEDICINE

## 2020-11-11 PROCEDURE — 73130 X-RAY EXAM OF HAND: CPT | Mod: TC,50,PN

## 2020-11-11 PROCEDURE — 86140 C-REACTIVE PROTEIN: CPT

## 2020-11-11 PROCEDURE — 73130 X-RAY EXAM OF HAND: CPT | Mod: 26,50,, | Performed by: RADIOLOGY

## 2020-11-11 PROCEDURE — 99214 OFFICE O/P EST MOD 30 MIN: CPT | Mod: S$GLB,,, | Performed by: INTERNAL MEDICINE

## 2020-11-11 PROCEDURE — 1101F PR PT FALLS ASSESS DOC 0-1 FALLS W/OUT INJ PAST YR: ICD-10-PCS | Mod: S$GLB,,, | Performed by: INTERNAL MEDICINE

## 2020-11-11 PROCEDURE — 73502 X-RAY EXAM HIP UNI 2-3 VIEWS: CPT | Mod: 26,RT,, | Performed by: RADIOLOGY

## 2020-11-11 PROCEDURE — 3008F BODY MASS INDEX DOCD: CPT | Mod: S$GLB,,, | Performed by: INTERNAL MEDICINE

## 2020-11-11 PROCEDURE — 1159F PR MEDICATION LIST DOCUMENTED IN MEDICAL RECORD: ICD-10-PCS | Mod: S$GLB,,, | Performed by: INTERNAL MEDICINE

## 2020-11-11 PROCEDURE — 1125F PR PAIN SEVERITY QUANTIFIED, PAIN PRESENT: ICD-10-PCS | Mod: S$GLB,,, | Performed by: INTERNAL MEDICINE

## 2020-11-11 PROCEDURE — 73502 X-RAY EXAM HIP UNI 2-3 VIEWS: CPT | Mod: TC,PN,RT

## 2020-11-11 PROCEDURE — 86769 SARS-COV-2 COVID-19 ANTIBODY: CPT

## 2020-11-11 PROCEDURE — 3074F SYST BP LT 130 MM HG: CPT | Mod: S$GLB,,, | Performed by: INTERNAL MEDICINE

## 2020-11-11 PROCEDURE — 85652 RBC SED RATE AUTOMATED: CPT

## 2020-11-11 PROCEDURE — 1125F AMNT PAIN NOTED PAIN PRSNT: CPT | Mod: S$GLB,,, | Performed by: INTERNAL MEDICINE

## 2020-11-11 PROCEDURE — 1101F PT FALLS ASSESS-DOCD LE1/YR: CPT | Mod: S$GLB,,, | Performed by: INTERNAL MEDICINE

## 2020-11-11 PROCEDURE — 86431 RHEUMATOID FACTOR QUANT: CPT

## 2020-11-11 PROCEDURE — 1159F MED LIST DOCD IN RCRD: CPT | Mod: S$GLB,,, | Performed by: INTERNAL MEDICINE

## 2020-11-11 PROCEDURE — 3078F DIAST BP <80 MM HG: CPT | Mod: S$GLB,,, | Performed by: INTERNAL MEDICINE

## 2020-11-11 PROCEDURE — 3078F PR MOST RECENT DIASTOLIC BLOOD PRESSURE < 80 MM HG: ICD-10-PCS | Mod: S$GLB,,, | Performed by: INTERNAL MEDICINE

## 2020-11-11 PROCEDURE — 3008F PR BODY MASS INDEX (BMI) DOCUMENTED: ICD-10-PCS | Mod: S$GLB,,, | Performed by: INTERNAL MEDICINE

## 2020-11-11 PROCEDURE — 73502 XR HIP 2 VIEW RIGHT: ICD-10-PCS | Mod: 26,RT,, | Performed by: RADIOLOGY

## 2020-11-11 PROCEDURE — 73130 XR HAND COMPLETE 3 VIEWS BILATERAL: ICD-10-PCS | Mod: 26,50,, | Performed by: RADIOLOGY

## 2020-11-11 PROCEDURE — 3074F PR MOST RECENT SYSTOLIC BLOOD PRESSURE < 130 MM HG: ICD-10-PCS | Mod: S$GLB,,, | Performed by: INTERNAL MEDICINE

## 2020-11-11 NOTE — PROGRESS NOTES
Subjective:       Patient ID: Keena Banks is a 65 y.o. female.    Medication List with Changes/Refills   Current Medications    FLUTICASONE (FLONASE) 50 MCG/ACTUATION NASAL SPRAY    2 sprays by Each Nare route once daily.    HYDROCHLOROTHIAZIDE (HYDRODIURIL) 25 MG TABLET    TAKE 1 TABLET DAILY    IBUPROFEN (ADVIL,MOTRIN) 200 MG TABLET    Take 400 mg by mouth every 6 (six) hours as needed for Pain.    LAMOTRIGINE XR (LAMICTAL XR) 300 MG XR TABLET    TAKE 1 TABLET DAILY    LOSARTAN (COZAAR) 50 MG TABLET    TAKE 1 TABLET DAILY    MELOXICAM (MOBIC) 7.5 MG TABLET    TAKE 1 TABLET BY MOUTH EVERY DAY    MULTIVITAMIN WITH MINERALS TABLET    Take 1 tablet by mouth once daily.      ROSUVASTATIN (CRESTOR) 20 MG TABLET    TAKE 1 TABLET BY MOUTH ONCE DAILY    VENLAFAXINE (EFFEXOR-XR) 150 MG CP24    TAKE 1 CAPSULE (150 MG TOTAL) BY MOUTH ONCE DAILY.    VENLAFAXINE (EFFEXOR-XR) 75 MG 24 HR CAPSULE    TAKE 1 CAPSULE ONCE DAILY, TO TAKE IN ADDITION TO  MG DOSE FOR A TOTAL  MG DAILY       Chief Complaint: Follow-up (6 month follow up )  She is here today to f/u on her chronic medical issues.      She has hypertension and is taking HCTZ 25 mg qday and losartan 50 mg qday. She has no known CAD. She denies chest pain or shortness of breath.      She has hyperlipidemia controlled on crestor 20 mg qday. Her last lipids were done on 5/2020 were 195/55/88/96. She is taking aspirin daily.      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 She is taking lamictal  mg qday. She is doing very well. She has not had a seizure for over 5 years. She does say the lamictal affects her memory but no other side effects. She follows with neurology annually and was last seen on 10/2019.  She had neuropsych testing on 7/2018 which showed no memory impairment but mild depression      She had an ultrasound showing fatty infiltration of her liver. She has normal LFTs.       She had  depression with anxiety and is taking venlafaxine 225 mg qday (150 +75 mg tablet daily).  She feels this is well controlled.  She is sleeping well. She denies any anxiety symptoms.No suicidal ideations  She is taking care of her granddaughter who is troubled due to the loss of her mother.      She has a history of NHL(stage III A diffuse large B cell lymphoma) s/p 8 cycles of chemotherapy with CHOP/rituxan. She was  seen by oncology annual and her last appt was on 8/2020.  She continues to be in remission and will f/u in one year.     She has continues pain and arthritis in her hands. She was seen by rheumatology on 7/2019 and dx with erosive ostearthritis.   She was given mobic 7.5 mg qday to use as needed and feels this helps control the pain in her hands. She reports increasing pain especially in the middle finger of her right hand. Her DIP joints remain swollen and painful.     She does complains of 2 weeks of right hip pain when she goes from sitting to standing. She will limp for a couple steps before the pain improves.  No pain with standing or walking but pain with change in position.  No pain with walking up stairs. No falls or injuries.      She reports testing positive for covid-19 in July but had no symptoms (exposed by her mother-in-law). She would like to test antibodies today.       She lives with her  and granddaughter.  She is retired and very happy with this decision.  She does walk 3 times a week but does not exercise. She tries to eat healthy.      Colonoscopy---8/2016 repeat in 8 years  Mammogram----6/2020 neg  DEXA-----11/2019 osteopenia with fx risk of 6.9 %, hip fx risk of 0.3 % (Tv -1.1, Tf -0.2)  Pap-----once since ANDREW neg  Tdap---5/2019  Influenza vaccine----9/2020  Pneumovax 23----11/2015  Prevnar 13----12/2016  Shingles vaccine-----11/2015  Shingrex--- 6/2018 , 9/2020     Review of Systems   Constitutional: Negative for appetite change, fatigue, fever and unexpected weight change.  "  HENT: Negative for congestion, ear pain, hearing loss, sore throat and trouble swallowing.    Eyes: Negative for pain and visual disturbance.   Respiratory: Negative for cough, chest tightness, shortness of breath and wheezing.    Cardiovascular: Negative for chest pain, palpitations and leg swelling.   Gastrointestinal: Negative for abdominal pain, blood in stool, constipation, diarrhea, nausea and vomiting.   Endocrine: Negative for polyuria.   Genitourinary: Negative for dysuria and hematuria.   Musculoskeletal: Positive for arthralgias. Negative for back pain and myalgias.   Skin: Negative for rash.   Neurological: Negative for dizziness, weakness, numbness and headaches.   Hematological: Does not bruise/bleed easily.   Psychiatric/Behavioral: Negative for dysphoric mood, sleep disturbance and suicidal ideas. The patient is not nervous/anxious.        Objective:      Vitals:    11/11/20 0908   BP: 124/70   Pulse: 64   Resp: 18   Temp: 98.2 °F (36.8 °C)   TempSrc: Temporal   Weight: 60.7 kg (133 lb 14.9 oz)   Height: 5' 3" (1.6 m)     Body mass index is 23.72 kg/m².  Physical Exam    General appearance: No acute distress, cooperative  Eyes: PERRL, EOMI, conjunctiva clear  Ears: normal external ear and pinna, tm clear without drainage, canals clear  Nose: Normal mucosa without drainage  Throat: no exudates or erythema, tonsils not enlarged  Mouth: no sores or lesions, moist mucous membranes  Neck: FROM, soft, supple, no thyromegaly, no bruits  Lymph: no anterior or posterior cervical adenopathy  Heart::  Regular rate and rhythm, no murmur  Lung: Clear to ascultation bilaterally, no wheezing, no rales, no rhonchi, no distress  Abdomen: Soft, nontender, no distention, no hepatosplenomegaly, bowel sounds normal, no guarding, no rebound, no peritoneal signs  Skin: no rashes, no lesions  Extremities: no edema, no cyanosis  Neuro: CN 2-12 intact, 5/5 muscle strength upper and lower extremity bilaterally, 2+ DTRs UE " and LE bilaterally, normal gait  Peripheral pulses: 2+ pedal pulses bilaterally, good perfusion and color  Musculoskeletal: FROM, good strenth, no tenderness  Joint: bilateral hands with deformity of DIP joints left > right with tenderness on palpation, CMC wasting        left hand      right hand       Assessment:       1. Essential hypertension    2. Hyperlipidemia, unspecified hyperlipidemia type    3. NAFLD (nonalcoholic fatty liver disease)    4. Major depressive disorder, recurrent episode, mild    5. SORAYA (generalized anxiety disorder)    6. Seizure disorder    7. History of lymphoma    8. Hand arthritis    9. Primary osteoarthritis of both hands    10. Right hip pain    11. History of 2019 novel coronavirus disease (COVID-19)        Plan:       Essential hypertension  Well controlled and continue current regimen. She is due for labs prior to her next appt.   -     CBC Auto Differential; Future; Expected date: 05/01/2021  -     Comprehensive Metabolic Panel; Future; Expected date: 05/01/2021  -     Lipid Panel; Future; Expected date: 05/01/2021  -     TSH; Future; Expected date: 05/01/2021    Hyperlipidemia, unspecified hyperlipidemia type  Good control on crestor.     NAFLD (nonalcoholic fatty liver disease)  Stable and continue to monitor.     Major depressive disorder, recurrent episode, mild  Good contorl on effexor.     SORAYA (generalized anxiety disorder)  Good control on effexor.     Seizure disorder  Stable on lamictal.     History of lymphoma  No active disease and she follows with oncology annually.     Hand arthritis  Concern about the DIP arthritis. Will check xrays for progression and refer to rheumatology for second opinion.   -     X-Ray Hand 3 View Bilateral; Future; Expected date: 11/11/2020  -     Ambulatory referral/consult to Rheumatology; Future; Expected date: 11/18/2020  -     C-Reactive Protein  -     Sedimentation rate  -     Rheumatoid Factor    Primary osteoarthritis of both  hands  Continue mobic for pain.     Right hip pain  Question etiology. Suspect due to bursitis vs muscular. Will check xrays to r/o arthritis.  Discussed starting home exercise program. Consider referral to PT after xrays.   -     X-Ray Hip 2 View Right; Future; Expected date: 11/11/2020    History of 2019 novel coronavirus disease (COVID-19)  Will check for antibodies today.     Follow up in about 6 months (around 5/11/2021) for chronic medical issues.

## 2020-11-12 ENCOUNTER — TELEPHONE (OUTPATIENT)
Dept: FAMILY MEDICINE | Facility: CLINIC | Age: 65
End: 2020-11-12

## 2020-11-12 NOTE — TELEPHONE ENCOUNTER
Please let her know that she has made positive antibodies to covid.     Please let her know that her xrays of her hands have shown some progression.  Her lab workup remains normal.      Her Xrays of the hip were completely normal.     thanks

## 2020-11-13 ENCOUNTER — TELEPHONE (OUTPATIENT)
Dept: FAMILY MEDICINE | Facility: CLINIC | Age: 65
End: 2020-11-13

## 2020-11-13 NOTE — TELEPHONE ENCOUNTER
----- Message from Beth Swanson sent at 11/13/2020 10:33 AM CST -----  Regarding: Pt advice  Contact: pt  Type:  Patient Returning Call    Who Called:  pt  Does the patient know what this is regarding?:  pt would like a copy of antibodies test  Best Call Back Number:    Additional Information:  Thank you

## 2021-02-03 DIAGNOSIS — E78.5 HYPERLIPIDEMIA, UNSPECIFIED HYPERLIPIDEMIA TYPE: ICD-10-CM

## 2021-02-04 RX ORDER — MELOXICAM 7.5 MG/1
7.5 TABLET ORAL DAILY
Qty: 90 TABLET | Refills: 3 | Status: SHIPPED | OUTPATIENT
Start: 2021-02-04 | End: 2021-07-19

## 2021-02-04 RX ORDER — ROSUVASTATIN CALCIUM 20 MG/1
20 TABLET, COATED ORAL DAILY
Qty: 90 TABLET | Refills: 3 | Status: SHIPPED | OUTPATIENT
Start: 2021-02-04 | End: 2021-12-27

## 2021-02-09 ENCOUNTER — TELEPHONE (OUTPATIENT)
Dept: FAMILY MEDICINE | Facility: CLINIC | Age: 66
End: 2021-02-09

## 2021-02-11 ENCOUNTER — IMMUNIZATION (OUTPATIENT)
Dept: FAMILY MEDICINE | Facility: CLINIC | Age: 66
End: 2021-02-11
Payer: MEDICARE

## 2021-02-11 DIAGNOSIS — Z23 NEED FOR VACCINATION: Primary | ICD-10-CM

## 2021-02-11 PROCEDURE — 91300 COVID-19, MRNA, LNP-S, PF, 30 MCG/0.3 ML DOSE VACCINE: CPT | Mod: PBBFAC | Performed by: FAMILY MEDICINE

## 2021-03-04 ENCOUNTER — IMMUNIZATION (OUTPATIENT)
Dept: FAMILY MEDICINE | Facility: CLINIC | Age: 66
End: 2021-03-04
Payer: MEDICARE

## 2021-03-04 DIAGNOSIS — Z23 NEED FOR VACCINATION: Primary | ICD-10-CM

## 2021-03-04 PROCEDURE — 91300 COVID-19, MRNA, LNP-S, PF, 30 MCG/0.3 ML DOSE VACCINE: CPT | Mod: PBBFAC | Performed by: FAMILY MEDICINE

## 2021-03-04 PROCEDURE — 0002A COVID-19, MRNA, LNP-S, PF, 30 MCG/0.3 ML DOSE VACCINE: CPT | Mod: PBBFAC | Performed by: FAMILY MEDICINE

## 2021-03-22 ENCOUNTER — TELEPHONE (OUTPATIENT)
Dept: RHEUMATOLOGY | Facility: CLINIC | Age: 66
End: 2021-03-22

## 2021-04-09 DIAGNOSIS — F41.1 GAD (GENERALIZED ANXIETY DISORDER): ICD-10-CM

## 2021-04-10 RX ORDER — VENLAFAXINE HYDROCHLORIDE 75 MG/1
CAPSULE, EXTENDED RELEASE ORAL
Qty: 90 CAPSULE | Refills: 3 | Status: SHIPPED | OUTPATIENT
Start: 2021-04-10 | End: 2022-03-21 | Stop reason: SDUPTHER

## 2021-04-19 ENCOUNTER — TELEPHONE (OUTPATIENT)
Dept: FAMILY MEDICINE | Facility: CLINIC | Age: 66
End: 2021-04-19

## 2021-05-07 ENCOUNTER — LAB VISIT (OUTPATIENT)
Dept: LAB | Facility: HOSPITAL | Age: 66
End: 2021-05-07
Attending: INTERNAL MEDICINE
Payer: MEDICARE

## 2021-05-07 DIAGNOSIS — I10 ESSENTIAL HYPERTENSION: ICD-10-CM

## 2021-05-07 LAB
ALBUMIN SERPL BCP-MCNC: 4.1 G/DL (ref 3.5–5.2)
ALP SERPL-CCNC: 73 U/L (ref 55–135)
ALT SERPL W/O P-5'-P-CCNC: 18 U/L (ref 10–44)
ANION GAP SERPL CALC-SCNC: 9 MMOL/L (ref 8–16)
AST SERPL-CCNC: 26 U/L (ref 10–40)
BASOPHILS # BLD AUTO: 0.07 K/UL (ref 0–0.2)
BASOPHILS NFR BLD: 1.6 % (ref 0–1.9)
BILIRUB SERPL-MCNC: 0.5 MG/DL (ref 0.1–1)
BUN SERPL-MCNC: 12 MG/DL (ref 8–23)
CALCIUM SERPL-MCNC: 10 MG/DL (ref 8.7–10.5)
CHLORIDE SERPL-SCNC: 102 MMOL/L (ref 95–110)
CHOLEST SERPL-MCNC: 187 MG/DL (ref 120–199)
CHOLEST/HDLC SERPL: 1.9 {RATIO} (ref 2–5)
CO2 SERPL-SCNC: 31 MMOL/L (ref 23–29)
CREAT SERPL-MCNC: 0.9 MG/DL (ref 0.5–1.4)
DIFFERENTIAL METHOD: ABNORMAL
EOSINOPHIL # BLD AUTO: 0.2 K/UL (ref 0–0.5)
EOSINOPHIL NFR BLD: 4.5 % (ref 0–8)
ERYTHROCYTE [DISTWIDTH] IN BLOOD BY AUTOMATED COUNT: 12.6 % (ref 11.5–14.5)
EST. GFR  (AFRICAN AMERICAN): >60 ML/MIN/1.73 M^2
EST. GFR  (NON AFRICAN AMERICAN): >60 ML/MIN/1.73 M^2
GLUCOSE SERPL-MCNC: 91 MG/DL (ref 70–110)
HCT VFR BLD AUTO: 38.4 % (ref 37–48.5)
HDLC SERPL-MCNC: 98 MG/DL (ref 40–75)
HDLC SERPL: 52.4 % (ref 20–50)
HGB BLD-MCNC: 12.1 G/DL (ref 12–16)
IMM GRANULOCYTES # BLD AUTO: 0.02 K/UL (ref 0–0.04)
IMM GRANULOCYTES NFR BLD AUTO: 0.4 % (ref 0–0.5)
LDLC SERPL CALC-MCNC: 77.8 MG/DL (ref 63–159)
LYMPHOCYTES # BLD AUTO: 1.5 K/UL (ref 1–4.8)
LYMPHOCYTES NFR BLD: 33.9 % (ref 18–48)
MCH RBC QN AUTO: 29.4 PG (ref 27–31)
MCHC RBC AUTO-ENTMCNC: 31.5 G/DL (ref 32–36)
MCV RBC AUTO: 93 FL (ref 82–98)
MONOCYTES # BLD AUTO: 0.3 K/UL (ref 0.3–1)
MONOCYTES NFR BLD: 7.6 % (ref 4–15)
NEUTROPHILS # BLD AUTO: 2.3 K/UL (ref 1.8–7.7)
NEUTROPHILS NFR BLD: 52 % (ref 38–73)
NONHDLC SERPL-MCNC: 89 MG/DL
NRBC BLD-RTO: 0 /100 WBC
PLATELET # BLD AUTO: 255 K/UL (ref 150–450)
PMV BLD AUTO: 12.2 FL (ref 9.2–12.9)
POTASSIUM SERPL-SCNC: 4.2 MMOL/L (ref 3.5–5.1)
PROT SERPL-MCNC: 7 G/DL (ref 6–8.4)
RBC # BLD AUTO: 4.11 M/UL (ref 4–5.4)
SODIUM SERPL-SCNC: 142 MMOL/L (ref 136–145)
TRIGL SERPL-MCNC: 56 MG/DL (ref 30–150)
TSH SERPL DL<=0.005 MIU/L-ACNC: 2.02 UIU/ML (ref 0.4–4)
WBC # BLD AUTO: 4.45 K/UL (ref 3.9–12.7)

## 2021-05-07 PROCEDURE — 36415 COLL VENOUS BLD VENIPUNCTURE: CPT | Mod: PN | Performed by: INTERNAL MEDICINE

## 2021-05-07 PROCEDURE — 80053 COMPREHEN METABOLIC PANEL: CPT | Performed by: INTERNAL MEDICINE

## 2021-05-07 PROCEDURE — 84443 ASSAY THYROID STIM HORMONE: CPT | Performed by: INTERNAL MEDICINE

## 2021-05-07 PROCEDURE — 85025 COMPLETE CBC W/AUTO DIFF WBC: CPT | Performed by: INTERNAL MEDICINE

## 2021-05-07 PROCEDURE — 80061 LIPID PANEL: CPT | Performed by: INTERNAL MEDICINE

## 2021-05-13 ENCOUNTER — OFFICE VISIT (OUTPATIENT)
Dept: FAMILY MEDICINE | Facility: CLINIC | Age: 66
End: 2021-05-13
Payer: MEDICARE

## 2021-05-13 VITALS
TEMPERATURE: 98 F | HEART RATE: 73 BPM | WEIGHT: 135.25 LBS | OXYGEN SATURATION: 97 % | DIASTOLIC BLOOD PRESSURE: 78 MMHG | SYSTOLIC BLOOD PRESSURE: 134 MMHG | HEIGHT: 63 IN | BODY MASS INDEX: 23.96 KG/M2

## 2021-05-13 DIAGNOSIS — I10 ESSENTIAL HYPERTENSION: Primary | ICD-10-CM

## 2021-05-13 DIAGNOSIS — Z78.0 ASYMPTOMATIC MENOPAUSAL STATE: ICD-10-CM

## 2021-05-13 DIAGNOSIS — Z23 NEED FOR PROPHYLACTIC VACCINATION AGAINST STREPTOCOCCUS PNEUMONIAE (PNEUMOCOCCUS): ICD-10-CM

## 2021-05-13 DIAGNOSIS — Z85.72 HISTORY OF LYMPHOMA: ICD-10-CM

## 2021-05-13 DIAGNOSIS — Z12.31 ENCOUNTER FOR SCREENING MAMMOGRAM FOR MALIGNANT NEOPLASM OF BREAST: ICD-10-CM

## 2021-05-13 DIAGNOSIS — E78.5 HYPERLIPIDEMIA, UNSPECIFIED HYPERLIPIDEMIA TYPE: ICD-10-CM

## 2021-05-13 DIAGNOSIS — M19.049 HAND ARTHRITIS: ICD-10-CM

## 2021-05-13 DIAGNOSIS — F41.1 GAD (GENERALIZED ANXIETY DISORDER): ICD-10-CM

## 2021-05-13 DIAGNOSIS — G40.909 SEIZURE DISORDER: ICD-10-CM

## 2021-05-13 DIAGNOSIS — K76.0 NAFLD (NONALCOHOLIC FATTY LIVER DISEASE): ICD-10-CM

## 2021-05-13 DIAGNOSIS — F33.0 MAJOR DEPRESSIVE DISORDER, RECURRENT EPISODE, MILD: ICD-10-CM

## 2021-05-13 PROCEDURE — 1101F PR PT FALLS ASSESS DOC 0-1 FALLS W/OUT INJ PAST YR: ICD-10-PCS | Mod: S$GLB,,, | Performed by: INTERNAL MEDICINE

## 2021-05-13 PROCEDURE — G0009 ADMIN PNEUMOCOCCAL VACCINE: HCPCS | Mod: S$GLB,,, | Performed by: INTERNAL MEDICINE

## 2021-05-13 PROCEDURE — 1101F PT FALLS ASSESS-DOCD LE1/YR: CPT | Mod: S$GLB,,, | Performed by: INTERNAL MEDICINE

## 2021-05-13 PROCEDURE — 99214 OFFICE O/P EST MOD 30 MIN: CPT | Mod: 25,S$GLB,, | Performed by: INTERNAL MEDICINE

## 2021-05-13 PROCEDURE — 3078F DIAST BP <80 MM HG: CPT | Mod: S$GLB,,, | Performed by: INTERNAL MEDICINE

## 2021-05-13 PROCEDURE — 1126F PR PAIN SEVERITY QUANTIFIED, NO PAIN PRESENT: ICD-10-PCS | Mod: S$GLB,,, | Performed by: INTERNAL MEDICINE

## 2021-05-13 PROCEDURE — 3075F SYST BP GE 130 - 139MM HG: CPT | Mod: S$GLB,,, | Performed by: INTERNAL MEDICINE

## 2021-05-13 PROCEDURE — 3078F PR MOST RECENT DIASTOLIC BLOOD PRESSURE < 80 MM HG: ICD-10-PCS | Mod: S$GLB,,, | Performed by: INTERNAL MEDICINE

## 2021-05-13 PROCEDURE — 3288F PR FALLS RISK ASSESSMENT DOCUMENTED: ICD-10-PCS | Mod: S$GLB,,, | Performed by: INTERNAL MEDICINE

## 2021-05-13 PROCEDURE — 3075F PR MOST RECENT SYSTOLIC BLOOD PRESS GE 130-139MM HG: ICD-10-PCS | Mod: S$GLB,,, | Performed by: INTERNAL MEDICINE

## 2021-05-13 PROCEDURE — 99214 PR OFFICE/OUTPT VISIT, EST, LEVL IV, 30-39 MIN: ICD-10-PCS | Mod: 25,S$GLB,, | Performed by: INTERNAL MEDICINE

## 2021-05-13 PROCEDURE — 1159F MED LIST DOCD IN RCRD: CPT | Mod: S$GLB,,, | Performed by: INTERNAL MEDICINE

## 2021-05-13 PROCEDURE — 90732 PPSV23 VACC 2 YRS+ SUBQ/IM: CPT | Mod: S$GLB,,, | Performed by: INTERNAL MEDICINE

## 2021-05-13 PROCEDURE — G0009 PNEUMOCOCCAL POLYSACCHARIDE VACCINE 23-VALENT =>2YO SQ IM: ICD-10-PCS | Mod: S$GLB,,, | Performed by: INTERNAL MEDICINE

## 2021-05-13 PROCEDURE — 1159F PR MEDICATION LIST DOCUMENTED IN MEDICAL RECORD: ICD-10-PCS | Mod: S$GLB,,, | Performed by: INTERNAL MEDICINE

## 2021-05-13 PROCEDURE — 90732 PNEUMOCOCCAL POLYSACCHARIDE VACCINE 23-VALENT =>2YO SQ IM: ICD-10-PCS | Mod: S$GLB,,, | Performed by: INTERNAL MEDICINE

## 2021-05-13 PROCEDURE — 3288F FALL RISK ASSESSMENT DOCD: CPT | Mod: S$GLB,,, | Performed by: INTERNAL MEDICINE

## 2021-05-13 PROCEDURE — 3008F BODY MASS INDEX DOCD: CPT | Mod: S$GLB,,, | Performed by: INTERNAL MEDICINE

## 2021-05-13 PROCEDURE — 1126F AMNT PAIN NOTED NONE PRSNT: CPT | Mod: S$GLB,,, | Performed by: INTERNAL MEDICINE

## 2021-05-13 PROCEDURE — 3008F PR BODY MASS INDEX (BMI) DOCUMENTED: ICD-10-PCS | Mod: S$GLB,,, | Performed by: INTERNAL MEDICINE

## 2021-05-13 RX ORDER — MELOXICAM 15 MG/1
15 TABLET ORAL DAILY
COMMUNITY
End: 2022-03-28

## 2021-06-10 ENCOUNTER — HOSPITAL ENCOUNTER (OUTPATIENT)
Dept: RADIOLOGY | Facility: HOSPITAL | Age: 66
Discharge: HOME | End: 2021-06-10
Attending: INTERNAL MEDICINE
Payer: MEDICARE

## 2021-06-10 DIAGNOSIS — Z12.31 ENCOUNTER FOR SCREENING MAMMOGRAM FOR MALIGNANT NEOPLASM OF BREAST: ICD-10-CM

## 2021-06-10 PROCEDURE — 77063 BREAST TOMOSYNTHESIS BI: CPT | Mod: 26,,, | Performed by: RADIOLOGY

## 2021-06-10 PROCEDURE — 77067 SCR MAMMO BI INCL CAD: CPT | Mod: 26,,, | Performed by: RADIOLOGY

## 2021-06-10 PROCEDURE — 77067 MAMMO DIGITAL SCREENING BILAT WITH TOMO: ICD-10-PCS | Mod: 26,,, | Performed by: RADIOLOGY

## 2021-06-10 PROCEDURE — 77063 MAMMO DIGITAL SCREENING BILAT WITH TOMO: ICD-10-PCS | Mod: 26,,, | Performed by: RADIOLOGY

## 2021-06-10 PROCEDURE — 77067 SCR MAMMO BI INCL CAD: CPT | Mod: TC,PO

## 2021-08-23 ENCOUNTER — LAB VISIT (OUTPATIENT)
Dept: LAB | Facility: HOSPITAL | Age: 66
End: 2021-08-23
Attending: NURSE PRACTITIONER
Payer: MEDICARE

## 2021-08-23 DIAGNOSIS — C82.90 NHL (NODULAR HISTIOCYTIC LYMPHOMA): ICD-10-CM

## 2021-08-23 LAB
ALBUMIN SERPL BCP-MCNC: 4.2 G/DL (ref 3.5–5.2)
ALP SERPL-CCNC: 72 U/L (ref 55–135)
ALT SERPL W/O P-5'-P-CCNC: 21 U/L (ref 10–44)
ANION GAP SERPL CALC-SCNC: 10 MMOL/L (ref 8–16)
AST SERPL-CCNC: 26 U/L (ref 10–40)
B2 MICROGLOB SERPL-MCNC: 1.5 UG/ML (ref 0–2.5)
BASOPHILS # BLD AUTO: 0.04 K/UL (ref 0–0.2)
BASOPHILS NFR BLD: 0.9 % (ref 0–1.9)
BILIRUB SERPL-MCNC: 0.3 MG/DL (ref 0.1–1)
BUN SERPL-MCNC: 14 MG/DL (ref 8–23)
CALCIUM SERPL-MCNC: 9.4 MG/DL (ref 8.7–10.5)
CHLORIDE SERPL-SCNC: 102 MMOL/L (ref 95–110)
CO2 SERPL-SCNC: 29 MMOL/L (ref 23–29)
CREAT SERPL-MCNC: 0.9 MG/DL (ref 0.5–1.4)
DIFFERENTIAL METHOD: ABNORMAL
EOSINOPHIL # BLD AUTO: 0.2 K/UL (ref 0–0.5)
EOSINOPHIL NFR BLD: 4.7 % (ref 0–8)
ERYTHROCYTE [DISTWIDTH] IN BLOOD BY AUTOMATED COUNT: 11.9 % (ref 11.5–14.5)
EST. GFR  (AFRICAN AMERICAN): >60 ML/MIN/1.73 M^2
EST. GFR  (NON AFRICAN AMERICAN): >60 ML/MIN/1.73 M^2
GLUCOSE SERPL-MCNC: 118 MG/DL (ref 70–110)
HCT VFR BLD AUTO: 34.9 % (ref 37–48.5)
HGB BLD-MCNC: 11.9 G/DL (ref 12–16)
IMM GRANULOCYTES # BLD AUTO: 0.02 K/UL (ref 0–0.04)
IMM GRANULOCYTES NFR BLD AUTO: 0.4 % (ref 0–0.5)
LDH SERPL L TO P-CCNC: 237 U/L (ref 110–260)
LYMPHOCYTES # BLD AUTO: 1.2 K/UL (ref 1–4.8)
LYMPHOCYTES NFR BLD: 25.1 % (ref 18–48)
MCH RBC QN AUTO: 30.9 PG (ref 27–31)
MCHC RBC AUTO-ENTMCNC: 34.1 G/DL (ref 32–36)
MCV RBC AUTO: 91 FL (ref 82–98)
MONOCYTES # BLD AUTO: 0.3 K/UL (ref 0.3–1)
MONOCYTES NFR BLD: 6 % (ref 4–15)
NEUTROPHILS # BLD AUTO: 2.9 K/UL (ref 1.8–7.7)
NEUTROPHILS NFR BLD: 62.9 % (ref 38–73)
NRBC BLD-RTO: 0 /100 WBC
PLATELET # BLD AUTO: 213 K/UL (ref 150–450)
PMV BLD AUTO: 9.3 FL (ref 9.2–12.9)
POTASSIUM SERPL-SCNC: 3.5 MMOL/L (ref 3.5–5.1)
PROT SERPL-MCNC: 6.6 G/DL (ref 6–8.4)
RBC # BLD AUTO: 3.85 M/UL (ref 4–5.4)
SODIUM SERPL-SCNC: 141 MMOL/L (ref 136–145)
WBC # BLD AUTO: 4.67 K/UL (ref 3.9–12.7)

## 2021-08-23 PROCEDURE — 36415 COLL VENOUS BLD VENIPUNCTURE: CPT | Mod: PO | Performed by: NURSE PRACTITIONER

## 2021-08-23 PROCEDURE — 83615 LACTATE (LD) (LDH) ENZYME: CPT | Mod: PO | Performed by: NURSE PRACTITIONER

## 2021-08-23 PROCEDURE — 80053 COMPREHEN METABOLIC PANEL: CPT | Mod: PO | Performed by: NURSE PRACTITIONER

## 2021-08-23 PROCEDURE — 85025 COMPLETE CBC W/AUTO DIFF WBC: CPT | Mod: PO | Performed by: NURSE PRACTITIONER

## 2021-08-23 PROCEDURE — 82232 ASSAY OF BETA-2 PROTEIN: CPT | Performed by: NURSE PRACTITIONER

## 2021-09-27 ENCOUNTER — OFFICE VISIT (OUTPATIENT)
Dept: HEMATOLOGY/ONCOLOGY | Facility: CLINIC | Age: 66
End: 2021-09-27
Payer: MEDICARE

## 2021-09-27 VITALS
OXYGEN SATURATION: 98 % | WEIGHT: 131.63 LBS | SYSTOLIC BLOOD PRESSURE: 126 MMHG | HEIGHT: 63 IN | TEMPERATURE: 98 F | BODY MASS INDEX: 23.32 KG/M2 | RESPIRATION RATE: 18 BRPM | HEART RATE: 73 BPM | DIASTOLIC BLOOD PRESSURE: 71 MMHG

## 2021-09-27 DIAGNOSIS — Z85.72 HISTORY OF LYMPHOMA: Primary | ICD-10-CM

## 2021-09-27 PROCEDURE — 1160F RVW MEDS BY RX/DR IN RCRD: CPT | Mod: S$GLB,,, | Performed by: NURSE PRACTITIONER

## 2021-09-27 PROCEDURE — 3074F PR MOST RECENT SYSTOLIC BLOOD PRESSURE < 130 MM HG: ICD-10-PCS | Mod: S$GLB,,, | Performed by: NURSE PRACTITIONER

## 2021-09-27 PROCEDURE — 1101F PT FALLS ASSESS-DOCD LE1/YR: CPT | Mod: S$GLB,,, | Performed by: NURSE PRACTITIONER

## 2021-09-27 PROCEDURE — 99999 PR PBB SHADOW E&M-EST. PATIENT-LVL IV: CPT | Mod: PBBFAC,,, | Performed by: NURSE PRACTITIONER

## 2021-09-27 PROCEDURE — 99213 PR OFFICE/OUTPT VISIT, EST, LEVL III, 20-29 MIN: ICD-10-PCS | Mod: S$GLB,,, | Performed by: NURSE PRACTITIONER

## 2021-09-27 PROCEDURE — 1159F PR MEDICATION LIST DOCUMENTED IN MEDICAL RECORD: ICD-10-PCS | Mod: S$GLB,,, | Performed by: NURSE PRACTITIONER

## 2021-09-27 PROCEDURE — 1126F AMNT PAIN NOTED NONE PRSNT: CPT | Mod: S$GLB,,, | Performed by: NURSE PRACTITIONER

## 2021-09-27 PROCEDURE — 3288F PR FALLS RISK ASSESSMENT DOCUMENTED: ICD-10-PCS | Mod: S$GLB,,, | Performed by: NURSE PRACTITIONER

## 2021-09-27 PROCEDURE — 1160F PR REVIEW ALL MEDS BY PRESCRIBER/CLIN PHARMACIST DOCUMENTED: ICD-10-PCS | Mod: S$GLB,,, | Performed by: NURSE PRACTITIONER

## 2021-09-27 PROCEDURE — 1159F MED LIST DOCD IN RCRD: CPT | Mod: S$GLB,,, | Performed by: NURSE PRACTITIONER

## 2021-09-27 PROCEDURE — 4010F ACE/ARB THERAPY RXD/TAKEN: CPT | Mod: S$GLB,,, | Performed by: NURSE PRACTITIONER

## 2021-09-27 PROCEDURE — 4010F PR ACE/ARB THEARPY RXD/TAKEN: ICD-10-PCS | Mod: S$GLB,,, | Performed by: NURSE PRACTITIONER

## 2021-09-27 PROCEDURE — 99999 PR PBB SHADOW E&M-EST. PATIENT-LVL IV: ICD-10-PCS | Mod: PBBFAC,,, | Performed by: NURSE PRACTITIONER

## 2021-09-27 PROCEDURE — 1126F PR PAIN SEVERITY QUANTIFIED, NO PAIN PRESENT: ICD-10-PCS | Mod: S$GLB,,, | Performed by: NURSE PRACTITIONER

## 2021-09-27 PROCEDURE — 3078F DIAST BP <80 MM HG: CPT | Mod: S$GLB,,, | Performed by: NURSE PRACTITIONER

## 2021-09-27 PROCEDURE — 99213 OFFICE O/P EST LOW 20 MIN: CPT | Mod: S$GLB,,, | Performed by: NURSE PRACTITIONER

## 2021-09-27 PROCEDURE — 1101F PR PT FALLS ASSESS DOC 0-1 FALLS W/OUT INJ PAST YR: ICD-10-PCS | Mod: S$GLB,,, | Performed by: NURSE PRACTITIONER

## 2021-09-27 PROCEDURE — 3288F FALL RISK ASSESSMENT DOCD: CPT | Mod: S$GLB,,, | Performed by: NURSE PRACTITIONER

## 2021-09-27 PROCEDURE — 3008F BODY MASS INDEX DOCD: CPT | Mod: S$GLB,,, | Performed by: NURSE PRACTITIONER

## 2021-09-27 PROCEDURE — 3008F PR BODY MASS INDEX (BMI) DOCUMENTED: ICD-10-PCS | Mod: S$GLB,,, | Performed by: NURSE PRACTITIONER

## 2021-09-27 PROCEDURE — 3078F PR MOST RECENT DIASTOLIC BLOOD PRESSURE < 80 MM HG: ICD-10-PCS | Mod: S$GLB,,, | Performed by: NURSE PRACTITIONER

## 2021-09-27 PROCEDURE — 3074F SYST BP LT 130 MM HG: CPT | Mod: S$GLB,,, | Performed by: NURSE PRACTITIONER

## 2021-10-26 RX ORDER — LAMOTRIGINE 300 MG/1
TABLET, EXTENDED RELEASE ORAL
Qty: 90 TABLET | Refills: 0 | Status: SHIPPED | OUTPATIENT
Start: 2021-10-26 | End: 2022-01-18

## 2021-11-10 ENCOUNTER — HOSPITAL ENCOUNTER (OUTPATIENT)
Dept: RADIOLOGY | Facility: HOSPITAL | Age: 66
Discharge: HOME | End: 2021-11-10
Attending: INTERNAL MEDICINE
Payer: MEDICARE

## 2021-11-10 ENCOUNTER — TELEPHONE (OUTPATIENT)
Dept: FAMILY MEDICINE | Facility: CLINIC | Age: 66
End: 2021-11-10
Payer: MEDICARE

## 2021-11-10 DIAGNOSIS — Z78.0 ASYMPTOMATIC MENOPAUSAL STATE: ICD-10-CM

## 2021-11-10 PROCEDURE — 77080 DXA BONE DENSITY AXIAL: CPT | Mod: 26,,, | Performed by: RADIOLOGY

## 2021-11-10 PROCEDURE — 77080 DEXA BONE DENSITY SPINE HIP: ICD-10-PCS | Mod: 26,,, | Performed by: RADIOLOGY

## 2021-11-10 PROCEDURE — 77080 DXA BONE DENSITY AXIAL: CPT | Mod: TC,PO

## 2021-12-03 ENCOUNTER — OFFICE VISIT (OUTPATIENT)
Dept: FAMILY MEDICINE | Facility: CLINIC | Age: 66
End: 2021-12-03
Payer: MEDICARE

## 2021-12-03 VITALS
DIASTOLIC BLOOD PRESSURE: 74 MMHG | SYSTOLIC BLOOD PRESSURE: 138 MMHG | HEART RATE: 65 BPM | OXYGEN SATURATION: 99 % | HEIGHT: 63 IN | BODY MASS INDEX: 23.01 KG/M2 | RESPIRATION RATE: 18 BRPM | WEIGHT: 129.88 LBS

## 2021-12-03 DIAGNOSIS — G40.909 SEIZURE DISORDER: ICD-10-CM

## 2021-12-03 DIAGNOSIS — K76.0 NAFLD (NONALCOHOLIC FATTY LIVER DISEASE): ICD-10-CM

## 2021-12-03 DIAGNOSIS — F33.0 MAJOR DEPRESSIVE DISORDER, RECURRENT EPISODE, MILD: ICD-10-CM

## 2021-12-03 DIAGNOSIS — Z85.72 HISTORY OF LYMPHOMA: ICD-10-CM

## 2021-12-03 DIAGNOSIS — E78.5 HYPERLIPIDEMIA, UNSPECIFIED HYPERLIPIDEMIA TYPE: ICD-10-CM

## 2021-12-03 DIAGNOSIS — F41.1 GAD (GENERALIZED ANXIETY DISORDER): ICD-10-CM

## 2021-12-03 DIAGNOSIS — I10 PRIMARY HYPERTENSION: Primary | ICD-10-CM

## 2021-12-03 DIAGNOSIS — Z23 NEED FOR IMMUNIZATION AGAINST INFLUENZA: ICD-10-CM

## 2021-12-03 DIAGNOSIS — M19.049 HAND ARTHRITIS: ICD-10-CM

## 2021-12-03 PROCEDURE — 90694 VACC AIIV4 NO PRSRV 0.5ML IM: CPT | Mod: S$GLB,,, | Performed by: INTERNAL MEDICINE

## 2021-12-03 PROCEDURE — 4010F PR ACE/ARB THEARPY RXD/TAKEN: ICD-10-PCS | Mod: CPTII,S$GLB,, | Performed by: INTERNAL MEDICINE

## 2021-12-03 PROCEDURE — G0008 ADMIN INFLUENZA VIRUS VAC: HCPCS | Mod: S$GLB,,, | Performed by: INTERNAL MEDICINE

## 2021-12-03 PROCEDURE — 90694 FLU VACCINE - QUADRIVALENT - ADJUVANTED: ICD-10-PCS | Mod: S$GLB,,, | Performed by: INTERNAL MEDICINE

## 2021-12-03 PROCEDURE — 99214 PR OFFICE/OUTPT VISIT, EST, LEVL IV, 30-39 MIN: ICD-10-PCS | Mod: S$GLB,,, | Performed by: INTERNAL MEDICINE

## 2021-12-03 PROCEDURE — G0008 FLU VACCINE - QUADRIVALENT - ADJUVANTED: ICD-10-PCS | Mod: S$GLB,,, | Performed by: INTERNAL MEDICINE

## 2021-12-03 PROCEDURE — 4010F ACE/ARB THERAPY RXD/TAKEN: CPT | Mod: CPTII,S$GLB,, | Performed by: INTERNAL MEDICINE

## 2021-12-03 PROCEDURE — 99214 OFFICE O/P EST MOD 30 MIN: CPT | Mod: S$GLB,,, | Performed by: INTERNAL MEDICINE

## 2021-12-08 ENCOUNTER — TELEPHONE (OUTPATIENT)
Dept: NEUROLOGY | Facility: CLINIC | Age: 66
End: 2021-12-08
Payer: MEDICARE

## 2021-12-10 ENCOUNTER — IMMUNIZATION (OUTPATIENT)
Dept: FAMILY MEDICINE | Facility: CLINIC | Age: 66
End: 2021-12-10
Payer: MEDICARE

## 2021-12-10 DIAGNOSIS — Z23 NEED FOR VACCINATION: Primary | ICD-10-CM

## 2021-12-10 PROCEDURE — 91300 COVID-19, MRNA, LNP-S, PF, 30 MCG/0.3 ML DOSE VACCINE: CPT | Mod: PBBFAC | Performed by: FAMILY MEDICINE

## 2021-12-10 PROCEDURE — 0003A COVID-19, MRNA, LNP-S, PF, 30 MCG/0.3 ML DOSE VACCINE: CPT | Mod: PBBFAC | Performed by: FAMILY MEDICINE

## 2021-12-20 DIAGNOSIS — E78.5 HYPERLIPIDEMIA, UNSPECIFIED HYPERLIPIDEMIA TYPE: ICD-10-CM

## 2021-12-22 ENCOUNTER — TELEPHONE (OUTPATIENT)
Dept: NEUROLOGY | Facility: CLINIC | Age: 66
End: 2021-12-22
Payer: MEDICARE

## 2021-12-27 RX ORDER — ROSUVASTATIN CALCIUM 20 MG/1
TABLET, COATED ORAL
Qty: 90 TABLET | Refills: 1 | Status: SHIPPED | OUTPATIENT
Start: 2021-12-27 | End: 2022-07-08

## 2022-01-18 ENCOUNTER — OFFICE VISIT (OUTPATIENT)
Dept: NEUROLOGY | Facility: CLINIC | Age: 67
End: 2022-01-18
Payer: MEDICARE

## 2022-01-18 VITALS
HEIGHT: 63 IN | DIASTOLIC BLOOD PRESSURE: 71 MMHG | RESPIRATION RATE: 16 BRPM | HEART RATE: 73 BPM | TEMPERATURE: 98 F | BODY MASS INDEX: 23.55 KG/M2 | SYSTOLIC BLOOD PRESSURE: 131 MMHG | WEIGHT: 132.94 LBS

## 2022-01-18 DIAGNOSIS — G40.909 SEIZURE DISORDER: Primary | ICD-10-CM

## 2022-01-18 DIAGNOSIS — T88.7XXA MEDICATION SIDE EFFECT: ICD-10-CM

## 2022-01-18 PROCEDURE — 99999 PR PBB SHADOW E&M-EST. PATIENT-LVL III: ICD-10-PCS | Mod: PBBFAC,,, | Performed by: PSYCHIATRY & NEUROLOGY

## 2022-01-18 PROCEDURE — 1159F MED LIST DOCD IN RCRD: CPT | Mod: CPTII,S$GLB,, | Performed by: PSYCHIATRY & NEUROLOGY

## 2022-01-18 PROCEDURE — 3008F BODY MASS INDEX DOCD: CPT | Mod: CPTII,S$GLB,, | Performed by: PSYCHIATRY & NEUROLOGY

## 2022-01-18 PROCEDURE — 3075F PR MOST RECENT SYSTOLIC BLOOD PRESS GE 130-139MM HG: ICD-10-PCS | Mod: CPTII,S$GLB,, | Performed by: PSYCHIATRY & NEUROLOGY

## 2022-01-18 PROCEDURE — 3078F DIAST BP <80 MM HG: CPT | Mod: CPTII,S$GLB,, | Performed by: PSYCHIATRY & NEUROLOGY

## 2022-01-18 PROCEDURE — 3078F PR MOST RECENT DIASTOLIC BLOOD PRESSURE < 80 MM HG: ICD-10-PCS | Mod: CPTII,S$GLB,, | Performed by: PSYCHIATRY & NEUROLOGY

## 2022-01-18 PROCEDURE — 99214 OFFICE O/P EST MOD 30 MIN: CPT | Mod: S$GLB,,, | Performed by: PSYCHIATRY & NEUROLOGY

## 2022-01-18 PROCEDURE — 99214 PR OFFICE/OUTPT VISIT, EST, LEVL IV, 30-39 MIN: ICD-10-PCS | Mod: S$GLB,,, | Performed by: PSYCHIATRY & NEUROLOGY

## 2022-01-18 PROCEDURE — 1126F AMNT PAIN NOTED NONE PRSNT: CPT | Mod: CPTII,S$GLB,, | Performed by: PSYCHIATRY & NEUROLOGY

## 2022-01-18 PROCEDURE — 3008F PR BODY MASS INDEX (BMI) DOCUMENTED: ICD-10-PCS | Mod: CPTII,S$GLB,, | Performed by: PSYCHIATRY & NEUROLOGY

## 2022-01-18 PROCEDURE — 99999 PR PBB SHADOW E&M-EST. PATIENT-LVL III: CPT | Mod: PBBFAC,,, | Performed by: PSYCHIATRY & NEUROLOGY

## 2022-01-18 PROCEDURE — 1126F PR PAIN SEVERITY QUANTIFIED, NO PAIN PRESENT: ICD-10-PCS | Mod: CPTII,S$GLB,, | Performed by: PSYCHIATRY & NEUROLOGY

## 2022-01-18 PROCEDURE — 3075F SYST BP GE 130 - 139MM HG: CPT | Mod: CPTII,S$GLB,, | Performed by: PSYCHIATRY & NEUROLOGY

## 2022-01-18 PROCEDURE — 1159F PR MEDICATION LIST DOCUMENTED IN MEDICAL RECORD: ICD-10-PCS | Mod: CPTII,S$GLB,, | Performed by: PSYCHIATRY & NEUROLOGY

## 2022-01-18 RX ORDER — LACOSAMIDE 50 MG/1
TABLET ORAL
Qty: 120 TABLET | Refills: 1 | Status: SHIPPED | OUTPATIENT
Start: 2022-01-18 | End: 2022-03-28
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
## 2022-02-10 ENCOUNTER — TELEPHONE (OUTPATIENT)
Dept: NEUROLOGY | Facility: CLINIC | Age: 67
End: 2022-02-10
Payer: MEDICARE

## 2022-02-10 NOTE — TELEPHONE ENCOUNTER
"Pt  wanted to let dr. garcia know that pt had a SZ at 4AM this morning. And is being admitted to [facility]. Was requesting to get dr garcia, let him know that dr. garcia does not do rounds at [facility]. But I could get any messages to him as soon as possible, let him know dr. garcia is in clinic today so to just bare with us on getting back to him.   Stated that pt that pt was transitioning from the SZ medication and that is why they are thinking that the pt had a sz this morning. Was requesting a call or any recommendations from dr. Garcia. " I know [facility] has their neurologist that are not connected with [facility], but I want dr. garcia to be aware of everything and we are just wanting him to let us know what can be done. " let pt know that once dr. garcia had a spare second to give any recommendation he would, but he was a little tied up in clinic today. He gave verbal understanding.       "

## 2022-02-10 NOTE — TELEPHONE ENCOUNTER
"Contact pt  to get dosages of medications, stated pt had been taking " every medications as instructed in each bottle" was stating how important it is for him to get to talk to dr garcia and get clarification.    was wanting to get an MRI requested and done ASAP because it is urgent. Let pt know once again that dr garcia does not have privileges at [facility] therefore he can not request that be done there, pt got agitated and repeatedly questioned " well does he want me to move her than, does he want me to move her" I let patient know that that was not what dr garcia stated, I needed to get the dosages of the medications for dr garcia.  stated he would really just like to speak with dr garcia to get everything clarified.   Let him know dr garcia is very busy today with clininc, gave verbal understanding.   "

## 2022-02-10 NOTE — TELEPHONE ENCOUNTER
Spoke to pt and pt's . Provided them with message from Dr. Lira. Pt requested records be sent to [facility]. I provided her with the health information phone number to request records.

## 2022-02-10 NOTE — TELEPHONE ENCOUNTER
----- Message from Hugo Clark sent at 2/10/2022 11:05 AM CST -----  Type: Patient Call Back         Who called:Patient  Houston Banks         What is the request in detail patient  called in regards to his wife having  a seizure at 4am and is now in  in [facility] . And he has some questions and concerns          Can the clinic reply by [facility]?no          Would the patient rather a call back or a response via [facility]?call back          Best call back number:[phone]         Additional Information:           Thank You

## 2022-02-15 ENCOUNTER — TELEPHONE (OUTPATIENT)
Dept: NEUROLOGY | Facility: CLINIC | Age: 67
End: 2022-02-15
Payer: MEDICARE
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
## 2022-02-15 NOTE — TELEPHONE ENCOUNTER
----- Message from Shakila Torres sent at 2/15/2022 10:57 AM CST -----  Regarding: hospital follow up  Contact: Justin with [facility]  Type:  Sooner Apoointment Request    Caller is requesting a sooner appointment.  Caller declined first available appointment listed below.  Caller will not accept being placed on the waitlist and is requesting a message be sent to doctor.    Name of Caller:  Justin Villagomez  When is the first available appointment?    Symptoms:  discharged 02/12/22 Dahlia dx seizures  Best Call Back Number:  [phone] (home)   Additional Information:  Patient needs to be seen within 2 weeks. Please call patient to schedule. Thanks!

## 2022-02-16 ENCOUNTER — TELEPHONE (OUTPATIENT)
Dept: NEUROLOGY | Facility: CLINIC | Age: 67
End: 2022-02-16
Payer: MEDICARE

## 2022-03-14 ENCOUNTER — HOSPITAL ENCOUNTER (OUTPATIENT)
Dept: RADIOLOGY | Facility: HOSPITAL | Age: 67
Discharge: HOME | End: 2022-03-14
Attending: PAIN MEDICINE
Payer: MEDICARE

## 2022-03-14 DIAGNOSIS — M25.511 PAIN IN RIGHT SHOULDER: ICD-10-CM

## 2022-03-14 PROCEDURE — 73221 MRI JOINT UPR EXTREM W/O DYE: CPT | Mod: TC,PO,RT

## 2022-03-14 PROCEDURE — 73221 MRI SHOULDER WITHOUT CONTRAST RIGHT: ICD-10-PCS | Mod: 26,RT,, | Performed by: RADIOLOGY

## 2022-03-14 PROCEDURE — 73221 MRI JOINT UPR EXTREM W/O DYE: CPT | Mod: 26,RT,, | Performed by: RADIOLOGY

## 2022-03-17 DIAGNOSIS — F33.0 MAJOR DEPRESSIVE DISORDER, RECURRENT, MILD: ICD-10-CM

## 2022-03-17 DIAGNOSIS — F41.1 GAD (GENERALIZED ANXIETY DISORDER): ICD-10-CM

## 2022-03-17 NOTE — TELEPHONE ENCOUNTER
Care Due:                  Date            Visit Type   Department     Provider  --------------------------------------------------------------------------------                                EP -                              PRIMARY      [facility]  Last Visit: 12-      CARE ([facility])   MEDICINE       Marisela Lockwood                              EP -                              PRIMARY      [facility]  Next Visit: 06-      CARE ([facility])   [facility]       Marisela Lockwood                                                            Last  Test          Frequency    Reason                     Performed    Due Date  --------------------------------------------------------------------------------    Lipid Panel.  12 months..  rosuvastatin.............  05- 05-    Powered by [employer].. Reference number: [id number].   3/17/2022 12:29:14 AM CDT

## 2022-03-21 RX ORDER — VENLAFAXINE HYDROCHLORIDE 75 MG/1
CAPSULE, EXTENDED RELEASE ORAL
Qty: 90 CAPSULE | Refills: 1 | Status: SHIPPED | OUTPATIENT
Start: 2022-03-21 | End: 2022-03-28 | Stop reason: ALTCHOICE

## 2022-03-21 RX ORDER — VENLAFAXINE HYDROCHLORIDE 150 MG/1
150 CAPSULE, EXTENDED RELEASE ORAL DAILY
Qty: 90 CAPSULE | Refills: 1 | Status: SHIPPED | OUTPATIENT
Start: 2022-03-21 | End: 2022-08-16

## 2022-03-21 NOTE — TELEPHONE ENCOUNTER
Refill Routing Note   Medication(s) are not appropriate for processing by [facility] for the following reason(s):      - Duplicate Medication/Therapy: venlafaxine  - Drug-Disease Interaction (venlafaxine and Seizure disorder and Fatty liver; NAFLD (nonalcoholic fatty liver disease))    [facility] action(s):  Defer Medication-related problems identified: Drug-disease interaction     Medication Therapy Plan: FLOS; EPIC OVERRIDE NEEDED PT TAKES VENLAFAXINE ER 75  MG FOR A TOTAL DOSE  MG  --->Care Gap information included in message below if applicable.   Medication reconciliation completed: No   Automatic Epic Generated Protocol Data:        Requested Prescriptions   Pending Prescriptions Disp Refills    venlafaxine (EFFEXOR-XR) 150 MG Cp24 [Pharmacy Med Name: VENLAFAXINE HCL  MG CAP] 90 capsule 1     Sig: Take 1 capsule (150 mg total) by mouth once daily.       Psychiatry: Antidepressants - SNRI - desvenlafaxine & venlafaxine Passed - 3/21/2022  9:21 AM        Passed - Patient is at least 18 years old        Passed - Last BP in normal range within 360 days     BP Readings from Last 3 Encounters:   01/18/22 131/71   12/03/21 138/74   09/27/21 126/71               Passed - Valid encounter within last 15 months     Recent Visits  Date Type Provider Dept   12/03/21 Office Visit [facility]   05/13/21 Office Visit [facility]   11/11/20 Office Visit [facility]   05/11/20 Office Visit [facility]   Showing recent visits within past 720 days and meeting all other requirements  Future Appointments  No visits were found meeting these conditions.  Showing future appointments within next 150 days and meeting all other requirements      Future Appointments              In 1 week MD [facility] - Neurology, [facility]    In 2 months LAB, [facility] - Lab, [place]    In  2 months Marisela Lockwood DO [place] [facility] [place]    In 6 months LAB, [facility] - Lab, [facility]    In 6 months Aryan Luciano NP [facility] - Hematology Oncology, [facility]                Passed - Cr is 1.39 or below and within 360 days     Lab Results   Component Value Date    CREATININE 0.9 08/23/2021    CREATININE 0.9 05/07/2021    CREATININE 1.0 08/24/2020              Passed - eGFR within 360 days     Lab Results   Component Value Date    EGFRNONAA >60 08/23/2021    EGFRNONAA >60.0 05/07/2021    EGFRNONAA 59 (A) 08/24/2020                  venlafaxine (EFFEXOR-XR) 75 MG 24 hr capsule 90 capsule 1     Sig: TAKE 1 CAPSULE ONCE DAILY, TO TAKE IN ADDITION TO  MG DOSE FOR A TOTAL  MG DAILY       There is no refill protocol information for this order           Appointments  past 12m or future 3m with PCP    Date Provider   Last Visit   12/3/2021 Marisela Lockwood DO   Next Visit   6/10/2022 Marisela Lockwood DO   ED visits in past 90 days: 0        Note composed:9:30 AM 03/21/2022

## 2022-03-28 ENCOUNTER — OFFICE VISIT (OUTPATIENT)
Dept: FAMILY MEDICINE | Facility: CLINIC | Age: 67
End: 2022-03-28
Payer: MEDICARE

## 2022-03-28 VITALS
TEMPERATURE: 98 F | DIASTOLIC BLOOD PRESSURE: 66 MMHG | HEART RATE: 77 BPM | RESPIRATION RATE: 18 BRPM | SYSTOLIC BLOOD PRESSURE: 126 MMHG | HEIGHT: 63 IN | OXYGEN SATURATION: 98 % | WEIGHT: 129.06 LBS | BODY MASS INDEX: 22.87 KG/M2

## 2022-03-28 DIAGNOSIS — F33.0 MAJOR DEPRESSIVE DISORDER, RECURRENT, MILD: ICD-10-CM

## 2022-03-28 DIAGNOSIS — I10 PRIMARY HYPERTENSION: ICD-10-CM

## 2022-03-28 DIAGNOSIS — G40.909 SEIZURE DISORDER: Primary | ICD-10-CM

## 2022-03-28 PROCEDURE — 3288F PR FALLS RISK ASSESSMENT DOCUMENTED: ICD-10-PCS | Mod: CPTII,S$GLB,, | Performed by: NURSE PRACTITIONER

## 2022-03-28 PROCEDURE — 99214 OFFICE O/P EST MOD 30 MIN: CPT | Mod: S$GLB,,, | Performed by: NURSE PRACTITIONER

## 2022-03-28 PROCEDURE — 3008F BODY MASS INDEX DOCD: CPT | Mod: CPTII,S$GLB,, | Performed by: NURSE PRACTITIONER

## 2022-03-28 PROCEDURE — 1126F AMNT PAIN NOTED NONE PRSNT: CPT | Mod: CPTII,S$GLB,, | Performed by: NURSE PRACTITIONER

## 2022-03-28 PROCEDURE — 3288F FALL RISK ASSESSMENT DOCD: CPT | Mod: CPTII,S$GLB,, | Performed by: NURSE PRACTITIONER

## 2022-03-28 PROCEDURE — 1101F PR PT FALLS ASSESS DOC 0-1 FALLS W/OUT INJ PAST YR: ICD-10-PCS | Mod: CPTII,S$GLB,, | Performed by: NURSE PRACTITIONER

## 2022-03-28 PROCEDURE — 3078F PR MOST RECENT DIASTOLIC BLOOD PRESSURE < 80 MM HG: ICD-10-PCS | Mod: CPTII,S$GLB,, | Performed by: NURSE PRACTITIONER

## 2022-03-28 PROCEDURE — 4010F PR ACE/ARB THEARPY RXD/TAKEN: ICD-10-PCS | Mod: CPTII,S$GLB,, | Performed by: NURSE PRACTITIONER

## 2022-03-28 PROCEDURE — 1159F PR MEDICATION LIST DOCUMENTED IN MEDICAL RECORD: ICD-10-PCS | Mod: CPTII,S$GLB,, | Performed by: NURSE PRACTITIONER

## 2022-03-28 PROCEDURE — 3074F PR MOST RECENT SYSTOLIC BLOOD PRESSURE < 130 MM HG: ICD-10-PCS | Mod: CPTII,S$GLB,, | Performed by: NURSE PRACTITIONER

## 2022-03-28 PROCEDURE — 3008F PR BODY MASS INDEX (BMI) DOCUMENTED: ICD-10-PCS | Mod: CPTII,S$GLB,, | Performed by: NURSE PRACTITIONER

## 2022-03-28 PROCEDURE — 1160F RVW MEDS BY RX/DR IN RCRD: CPT | Mod: CPTII,S$GLB,, | Performed by: NURSE PRACTITIONER

## 2022-03-28 PROCEDURE — 1159F MED LIST DOCD IN RCRD: CPT | Mod: CPTII,S$GLB,, | Performed by: NURSE PRACTITIONER

## 2022-03-28 PROCEDURE — 99214 PR OFFICE/OUTPT VISIT, EST, LEVL IV, 30-39 MIN: ICD-10-PCS | Mod: S$GLB,,, | Performed by: NURSE PRACTITIONER

## 2022-03-28 PROCEDURE — 1160F PR REVIEW ALL MEDS BY PRESCRIBER/CLIN PHARMACIST DOCUMENTED: ICD-10-PCS | Mod: CPTII,S$GLB,, | Performed by: NURSE PRACTITIONER

## 2022-03-28 PROCEDURE — 1101F PT FALLS ASSESS-DOCD LE1/YR: CPT | Mod: CPTII,S$GLB,, | Performed by: NURSE PRACTITIONER

## 2022-03-28 PROCEDURE — 3078F DIAST BP <80 MM HG: CPT | Mod: CPTII,S$GLB,, | Performed by: NURSE PRACTITIONER

## 2022-03-28 PROCEDURE — 3074F SYST BP LT 130 MM HG: CPT | Mod: CPTII,S$GLB,, | Performed by: NURSE PRACTITIONER

## 2022-03-28 PROCEDURE — 1126F PR PAIN SEVERITY QUANTIFIED, NO PAIN PRESENT: ICD-10-PCS | Mod: CPTII,S$GLB,, | Performed by: NURSE PRACTITIONER

## 2022-03-28 PROCEDURE — 4010F ACE/ARB THERAPY RXD/TAKEN: CPT | Mod: CPTII,S$GLB,, | Performed by: NURSE PRACTITIONER

## 2022-03-28 RX ORDER — LEVETIRACETAM 500 MG/1
500 TABLET ORAL 2 TIMES DAILY
COMMUNITY
Start: 2022-03-11 | End: 2022-04-12 | Stop reason: SDUPTHER

## 2022-03-28 RX ORDER — LAMOTRIGINE 50 MG/1
50 TABLET, EXTENDED RELEASE ORAL DAILY
COMMUNITY
End: 2022-05-25

## 2022-03-28 NOTE — PROGRESS NOTES
"Subjective:       Patient ID: Keena Banks is a 66 y.o. female.    Chief Complaint: Follow-up (Follow up to a seizure in February)    HPI Here for follow up. States she had a seizure in February after being seizure free for 8 years. She was switched  from Lamictal to Vimpat on 01/18/22, pt had just completed entire titration from Lamictal x 4 days prior to the seizure. Pt reports she remained in the hospital x 3 days. Medications were changed to: Lamictal 50 mg once daily, Keppra  500 mg q 12 hrs, Dc'd Vimpat. She has a follow up with Dr. Lira on Thursday. The goal is to wean the keppra and get back to theraputic dose of lamictal. Sleeping better at night. States she was stable on lamictal for many years but her  thought it was making her "maldonado" so wanted her to try something different.     She is taking effexor for depression. She feels that is working well at current dose. She was on 225mg at one time, but now down to 150mg and feels that is working well.     Good BP control with Losartan daily. States she is feeling more stable. See ROS.    The following portion of the patients history was reviewed and updated as appropriate: allergies, current medications, past medical and surgical history. Past social history and problem list reviewed. Family PMH and Past social history reviewed. Tobacco, Illicit drug use reviewed.      Review of patient's allergies indicates:   Allergen Reactions    Adhesive Rash    Sulfa (sulfonamide antibiotics) Rash         Current Outpatient Medications:     fluticasone (FLONASE) 50 mcg/actuation nasal spray, 2 sprays by Each Nare route once daily., Disp: 1 Bottle, Rfl: 6    hydroCHLOROthiazide (HYDRODIURIL) 25 MG tablet, TAKE 1 TABLET DAILY, Disp: 90 tablet, Rfl: 3    ibuprofen (ADVIL,MOTRIN) 200 MG tablet, Take 400 mg by mouth every 6 (six) hours as needed for Pain., Disp: , Rfl:     lamoTRIgine 50 mg TR24, Take 50 mg by mouth once daily., Disp: , Rfl:     levETIRAcetam " (KEPPRA) 500 MG Tab, 500 mg., Disp: , Rfl:     losartan (COZAAR) 50 MG tablet, TAKE 1 TABLET DAILY, Disp: 90 tablet, Rfl: 3    rosuvastatin (CRESTOR) 20 MG tablet, TAKE 1 TABLET BY MOUTH ONCE DAILY, Disp: 90 tablet, Rfl: 1    venlafaxine (EFFEXOR-XR) 150 MG Cp24, Take 1 capsule (150 mg total) by mouth once daily., Disp: 90 capsule, Rfl: 1    meloxicam (MOBIC) 15 MG tablet, Take 15 mg by mouth once daily., Disp: , Rfl:     multivitamin with minerals tablet, Take 1 tablet by mouth once daily., Disp: , Rfl:     venlafaxine (EFFEXOR-XR) 75 MG 24 hr capsule, TAKE 1 CAPSULE ONCE DAILY, TO TAKE IN ADDITION TO  MG DOSE FOR A TOTAL  MG DAILY (Patient not taking: Reported on 3/28/2022), Disp: 90 capsule, Rfl: 1    Past Medical History:   Diagnosis Date    Depression     anxiety and depression situational    Hypertension     Menopausal symptom     vaginal dryness and hot flashes    NHL (non-Hodgkin's lymphoma) 2013    s/p 8 cycles of CHOP/Rituxan    Osteoarthritis     both hands    Seasonal allergies     seasonal and animal    Seizures 2013    Started 11/2014 after treatment for NHL, MRI brain normal, Grand mal 10/2014 and 6/2014 started several months after chemo    Skin cancer 01/2020       Past Surgical History:   Procedure Laterality Date    BONE MARROW ASPIRATION      CARPAL TUNNEL RELEASE Right     CARPAL TUNNEL RELEASE      COLONOSCOPY  09/15/2006    [facility].   One 2-3 mm polyp in the hepatic flexure.  HYPERPLASTIC POLYP.  Small internal hemorrhoids.    COLONOSCOPY N/A 8/10/2016    Procedure: COLONOSCOPY;  Surgeon: Patel Foster Jr., MD;  Location: [facility];  Service: Endoscopy;  Laterality: N/A;    HYSTERECTOMY      total, including ovaries secondary to endometriosis, fibroids    LYMPH NODE BIOPSY  01/24/12    Left cervical lymph node    OOPHORECTOMY      PORTACATH PLACEMENT      portacath removal  2015    SALIVARY GLAND SURGERY Right     removed for large stone    TONSILLECTOMY  "     VAGINAL DELIVERY      times 2       Social History     Socioeconomic History    Marital status:    Occupational History    Occupation: works for [employer]   Tobacco Use    Smoking status: Never Smoker    Smokeless tobacco: Never Used   Substance and Sexual Activity    Alcohol use: No     Comment: Occasional, rare    Drug use: No    Sexual activity: Yes     Partners: Male     Birth control/protection: None, See Surgical Hx     Comment: hysterectomy     Review of Systems   Constitutional: Negative for fatigue and fever.   HENT: Negative for congestion, postnasal drip, rhinorrhea and voice change.    Eyes: Negative for visual disturbance.   Respiratory: Negative for cough, chest tightness, shortness of breath and wheezing.    Cardiovascular: Negative for chest pain, palpitations and leg swelling.   Gastrointestinal: Negative for abdominal pain, blood in stool, constipation, diarrhea, nausea and vomiting.   Genitourinary: Negative for difficulty urinating and dysuria.   Musculoskeletal: Negative for arthralgias, back pain and gait problem.   Skin: Negative for rash and wound.   Neurological: Positive for seizures (see HPI). Negative for dizziness, weakness and headaches.   Hematological: Negative for adenopathy. Does not bruise/bleed easily.   Psychiatric/Behavioral: Negative for decreased concentration, dysphoric mood and sleep disturbance. The patient is not nervous/anxious.        Objective:      /66   Pulse 77   Temp 97.7 °F (36.5 °C)   Resp 18   Ht 5' 3" (1.6 m)   Wt 58.6 kg (129 lb 1.3 oz)   SpO2 98%   BMI 22.87 kg/m²      Physical Exam  Constitutional:       General: She is not in acute distress.     Appearance: Normal appearance. She is well-developed and normal weight.   HENT:      Head: Normocephalic.      Nose: Nose normal.      Mouth/Throat:      Mouth: Mucous membranes are moist.      Pharynx: Oropharynx is clear.   Eyes:      Conjunctiva/sclera: Conjunctivae " normal.      Pupils: Pupils are equal, round, and reactive to light.   Neck:      Thyroid: No thyromegaly.      Vascular: No carotid bruit.      Trachea: Trachea normal. No tracheal tenderness or tracheal deviation.   Cardiovascular:      Rate and Rhythm: Normal rate and regular rhythm.      Pulses: Normal pulses.           Carotid pulses are 2+ on the right side and 2+ on the left side.       Radial pulses are 2+ on the right side and 2+ on the left side.      Heart sounds: Normal heart sounds. No murmur heard.    No gallop.   Pulmonary:      Effort: Pulmonary effort is normal. No respiratory distress.      Breath sounds: Normal breath sounds. No stridor. No wheezing, rhonchi or rales.   Abdominal:      General: Bowel sounds are normal.      Palpations: Abdomen is soft. There is no splenomegaly or mass.      Tenderness: There is no abdominal tenderness. There is no rebound.   Musculoskeletal:         General: Normal range of motion.      Cervical back: Full passive range of motion without pain, normal range of motion and neck supple. No edema.      Right lower leg: No edema.      Left lower leg: No edema.      Comments: Gait and coordination normal.  strong, equal. Upper and lower extremity strength normal.    Skin:     General: Skin is warm and dry.      Capillary Refill: Capillary refill takes less than 2 seconds.      Findings: No lesion or rash.   Neurological:      General: No focal deficit present.      Mental Status: She is alert and oriented to person, place, and time.   Psychiatric:         Attention and Perception: Attention and perception normal.         Mood and Affect: Mood and affect normal.         Speech: Speech normal.         Behavior: Behavior normal.         Assessment:       1. Seizure disorder    2. Major depressive disorder, recurrent, mild    3. Primary hypertension        Plan:       Seizure disorder: stable. In the process of medication adjustments per Neurology. Has follow up  tomorrow with Dr. Lira    Major depressive disorder, recurrent, mild: doing well on lower dose of effexor. Continue current medication.     Primary hypertension: good BP control on current medications.      Continue current medication  Take medications only as prescribed  Healthy diet, exercise  Adequate rest  Adequate hydration  Avoid allergens  Avoid excessive caffeine     follow up as scheduled with Dr. Lockwood

## 2022-03-31 ENCOUNTER — OFFICE VISIT (OUTPATIENT)
Dept: NEUROLOGY | Facility: CLINIC | Age: 67
End: 2022-03-31
Payer: MEDICARE

## 2022-03-31 VITALS
SYSTOLIC BLOOD PRESSURE: 128 MMHG | RESPIRATION RATE: 18 BRPM | TEMPERATURE: 98 F | HEART RATE: 82 BPM | BODY MASS INDEX: 22.49 KG/M2 | DIASTOLIC BLOOD PRESSURE: 60 MMHG | WEIGHT: 127 LBS

## 2022-03-31 DIAGNOSIS — T88.7XXA MEDICATION SIDE EFFECT: ICD-10-CM

## 2022-03-31 DIAGNOSIS — G40.909 SEIZURE DISORDER: Primary | ICD-10-CM

## 2022-03-31 PROCEDURE — 99999 PR PBB SHADOW E&M-EST. PATIENT-LVL IV: CPT | Mod: PBBFAC,,, | Performed by: PSYCHIATRY & NEUROLOGY

## 2022-03-31 PROCEDURE — 1125F AMNT PAIN NOTED PAIN PRSNT: CPT | Mod: CPTII,S$GLB,, | Performed by: PSYCHIATRY & NEUROLOGY

## 2022-03-31 PROCEDURE — 3074F SYST BP LT 130 MM HG: CPT | Mod: CPTII,S$GLB,, | Performed by: PSYCHIATRY & NEUROLOGY

## 2022-03-31 PROCEDURE — 1159F MED LIST DOCD IN RCRD: CPT | Mod: CPTII,S$GLB,, | Performed by: PSYCHIATRY & NEUROLOGY

## 2022-03-31 PROCEDURE — 1101F PT FALLS ASSESS-DOCD LE1/YR: CPT | Mod: CPTII,S$GLB,, | Performed by: PSYCHIATRY & NEUROLOGY

## 2022-03-31 PROCEDURE — 99214 OFFICE O/P EST MOD 30 MIN: CPT | Mod: S$GLB,,, | Performed by: PSYCHIATRY & NEUROLOGY

## 2022-03-31 PROCEDURE — 4010F PR ACE/ARB THEARPY RXD/TAKEN: ICD-10-PCS | Mod: CPTII,S$GLB,, | Performed by: PSYCHIATRY & NEUROLOGY

## 2022-03-31 PROCEDURE — 3008F PR BODY MASS INDEX (BMI) DOCUMENTED: ICD-10-PCS | Mod: CPTII,S$GLB,, | Performed by: PSYCHIATRY & NEUROLOGY

## 2022-03-31 PROCEDURE — 1101F PR PT FALLS ASSESS DOC 0-1 FALLS W/OUT INJ PAST YR: ICD-10-PCS | Mod: CPTII,S$GLB,, | Performed by: PSYCHIATRY & NEUROLOGY

## 2022-03-31 PROCEDURE — 3074F PR MOST RECENT SYSTOLIC BLOOD PRESSURE < 130 MM HG: ICD-10-PCS | Mod: CPTII,S$GLB,, | Performed by: PSYCHIATRY & NEUROLOGY

## 2022-03-31 PROCEDURE — 3078F DIAST BP <80 MM HG: CPT | Mod: CPTII,S$GLB,, | Performed by: PSYCHIATRY & NEUROLOGY

## 2022-03-31 PROCEDURE — 3008F BODY MASS INDEX DOCD: CPT | Mod: CPTII,S$GLB,, | Performed by: PSYCHIATRY & NEUROLOGY

## 2022-03-31 PROCEDURE — 1125F PR PAIN SEVERITY QUANTIFIED, PAIN PRESENT: ICD-10-PCS | Mod: CPTII,S$GLB,, | Performed by: PSYCHIATRY & NEUROLOGY

## 2022-03-31 PROCEDURE — 99214 PR OFFICE/OUTPT VISIT, EST, LEVL IV, 30-39 MIN: ICD-10-PCS | Mod: S$GLB,,, | Performed by: PSYCHIATRY & NEUROLOGY

## 2022-03-31 PROCEDURE — 99999 PR PBB SHADOW E&M-EST. PATIENT-LVL IV: ICD-10-PCS | Mod: PBBFAC,,, | Performed by: PSYCHIATRY & NEUROLOGY

## 2022-03-31 PROCEDURE — 1159F PR MEDICATION LIST DOCUMENTED IN MEDICAL RECORD: ICD-10-PCS | Mod: CPTII,S$GLB,, | Performed by: PSYCHIATRY & NEUROLOGY

## 2022-03-31 PROCEDURE — 3288F FALL RISK ASSESSMENT DOCD: CPT | Mod: CPTII,S$GLB,, | Performed by: PSYCHIATRY & NEUROLOGY

## 2022-03-31 PROCEDURE — 3288F PR FALLS RISK ASSESSMENT DOCUMENTED: ICD-10-PCS | Mod: CPTII,S$GLB,, | Performed by: PSYCHIATRY & NEUROLOGY

## 2022-03-31 PROCEDURE — 4010F ACE/ARB THERAPY RXD/TAKEN: CPT | Mod: CPTII,S$GLB,, | Performed by: PSYCHIATRY & NEUROLOGY

## 2022-03-31 PROCEDURE — 3078F PR MOST RECENT DIASTOLIC BLOOD PRESSURE < 80 MM HG: ICD-10-PCS | Mod: CPTII,S$GLB,, | Performed by: PSYCHIATRY & NEUROLOGY

## 2022-03-31 RX ORDER — ASPIRIN 81 MG/1
81 TABLET ORAL
COMMUNITY
End: 2022-12-12 | Stop reason: DRUGHIGH
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
## 2022-04-01 DIAGNOSIS — G40.909 SEIZURE DISORDER: Primary | ICD-10-CM

## 2022-04-01 RX ORDER — LAMOTRIGINE 100 MG/1
TABLET, EXTENDED RELEASE ORAL
Qty: 70 TABLET | Refills: 0 | Status: SHIPPED | OUTPATIENT
Start: 2022-04-01 | End: 2022-05-25

## 2022-04-12 RX ORDER — LEVETIRACETAM 500 MG/1
500 TABLET ORAL 2 TIMES DAILY
Qty: 60 TABLET | Refills: 1 | Status: SHIPPED | OUTPATIENT
Start: 2022-04-12 | End: 2022-06-10

## 2022-04-12 NOTE — TELEPHONE ENCOUNTER
----- Message from Zack Pelaez sent at 2022  9:11 AM CDT -----  Regarding: refill  Type:  RX Refill Request    Who Called:  Keena  Refill or New Rx:  refill    RX Name and Strength:    levETIRAcetam (KEPPRA) 500 MG Tab   3/11/2022    Si mg 2 (two) times daily.     How is the patient currently taking it? (ex. 1XDay):  see above    Is this a 30 day or 90 day RX:  30-day\    Preferred Pharmacy with phone number:    [facility]/pharmacy #[id number] - [place] - [address]  [address]  [place] [address]  Phone: [phone] Fax: [phone]    Local or Mail Order:  local    Ordering Provider:  Dr Fabián Spain Call Back Number:  [phone]     Additional Information:  pt is switching to lamotrigine XR (LAMICTAL XR) 100 mg TR24 XR tablet but has to stay on keppra until reaches full dose level which will be 4-5 weeks according to pt. PT only has 3 days of keppra left.

## 2022-04-21 ENCOUNTER — TELEPHONE (OUTPATIENT)
Dept: NEUROLOGY | Facility: CLINIC | Age: 67
End: 2022-04-21
Payer: MEDICARE

## 2022-04-21 NOTE — TELEPHONE ENCOUNTER
Patient scheduled for July 15 at 2 pm with ELIOT Lozano for a follow medication check for seizures per Dr. Lira's request

## 2022-04-21 NOTE — TELEPHONE ENCOUNTER
----- Message from Shakila Torres sent at 4/21/2022 11:48 AM CDT -----  Regarding: appointment  Contact: patient  Type:  Sooner Appointment Request    Caller is requesting a sooner appointment.  Caller declined first available appointment listed below.  Caller will not accept being placed on the waitlist and is requesting a message be sent to doctor.    Name of Caller:  patient  When is the first available appointment?    Symptoms:  check medication f/u seizures  Best Call Back Number:  [phone] (home)   Additional Information:  Patient has recall letter to schedule in July. Please call patient to advise.Thanks!

## 2022-05-11 ENCOUNTER — PATIENT MESSAGE (OUTPATIENT)
Dept: NEUROLOGY | Facility: CLINIC | Age: 67
End: 2022-05-11
Payer: MEDICARE

## 2022-05-24 ENCOUNTER — TELEPHONE (OUTPATIENT)
Dept: NEUROLOGY | Facility: CLINIC | Age: 67
End: 2022-05-24
Payer: MEDICARE

## 2022-05-25 ENCOUNTER — OFFICE VISIT (OUTPATIENT)
Dept: NEUROLOGY | Facility: CLINIC | Age: 67
End: 2022-05-25
Payer: MEDICARE

## 2022-05-25 ENCOUNTER — TELEPHONE (OUTPATIENT)
Dept: NEUROLOGY | Facility: CLINIC | Age: 67
End: 2022-05-25
Payer: MEDICARE

## 2022-05-25 VITALS
BODY MASS INDEX: 22.97 KG/M2 | SYSTOLIC BLOOD PRESSURE: 122 MMHG | HEIGHT: 63 IN | WEIGHT: 129.63 LBS | DIASTOLIC BLOOD PRESSURE: 71 MMHG | HEART RATE: 71 BPM

## 2022-05-25 DIAGNOSIS — G45.9 TIA (TRANSIENT ISCHEMIC ATTACK): Primary | ICD-10-CM

## 2022-05-25 DIAGNOSIS — D32.9 MENINGIOMA: ICD-10-CM

## 2022-05-25 DIAGNOSIS — G40.909 SEIZURE DISORDER: ICD-10-CM

## 2022-05-25 PROCEDURE — 3074F SYST BP LT 130 MM HG: CPT | Mod: CPTII,S$GLB,, | Performed by: NURSE PRACTITIONER

## 2022-05-25 PROCEDURE — 3288F FALL RISK ASSESSMENT DOCD: CPT | Mod: CPTII,S$GLB,, | Performed by: NURSE PRACTITIONER

## 2022-05-25 PROCEDURE — 3078F PR MOST RECENT DIASTOLIC BLOOD PRESSURE < 80 MM HG: ICD-10-PCS | Mod: CPTII,S$GLB,, | Performed by: NURSE PRACTITIONER

## 2022-05-25 PROCEDURE — 1101F PT FALLS ASSESS-DOCD LE1/YR: CPT | Mod: CPTII,S$GLB,, | Performed by: NURSE PRACTITIONER

## 2022-05-25 PROCEDURE — 99417 PR PROLONGED SVC, OUTPT, W/WO DIRECT PT CONTACT,  EA ADDTL 15 MIN: ICD-10-PCS | Mod: S$GLB,,, | Performed by: NURSE PRACTITIONER

## 2022-05-25 PROCEDURE — 3078F DIAST BP <80 MM HG: CPT | Mod: CPTII,S$GLB,, | Performed by: NURSE PRACTITIONER

## 2022-05-25 PROCEDURE — 4010F ACE/ARB THERAPY RXD/TAKEN: CPT | Mod: CPTII,S$GLB,, | Performed by: NURSE PRACTITIONER

## 2022-05-25 PROCEDURE — 3008F BODY MASS INDEX DOCD: CPT | Mod: CPTII,S$GLB,, | Performed by: NURSE PRACTITIONER

## 2022-05-25 PROCEDURE — 99417 PROLNG OP E/M EACH 15 MIN: CPT | Mod: S$GLB,,, | Performed by: NURSE PRACTITIONER

## 2022-05-25 PROCEDURE — 99215 PR OFFICE/OUTPT VISIT, EST, LEVL V, 40-54 MIN: ICD-10-PCS | Mod: S$GLB,,, | Performed by: NURSE PRACTITIONER

## 2022-05-25 PROCEDURE — 1101F PR PT FALLS ASSESS DOC 0-1 FALLS W/OUT INJ PAST YR: ICD-10-PCS | Mod: CPTII,S$GLB,, | Performed by: NURSE PRACTITIONER

## 2022-05-25 PROCEDURE — 99999 PR PBB SHADOW E&M-EST. PATIENT-LVL IV: ICD-10-PCS | Mod: PBBFAC,,, | Performed by: NURSE PRACTITIONER

## 2022-05-25 PROCEDURE — 3288F PR FALLS RISK ASSESSMENT DOCUMENTED: ICD-10-PCS | Mod: CPTII,S$GLB,, | Performed by: NURSE PRACTITIONER

## 2022-05-25 PROCEDURE — 1126F AMNT PAIN NOTED NONE PRSNT: CPT | Mod: CPTII,S$GLB,, | Performed by: NURSE PRACTITIONER

## 2022-05-25 PROCEDURE — 3074F PR MOST RECENT SYSTOLIC BLOOD PRESSURE < 130 MM HG: ICD-10-PCS | Mod: CPTII,S$GLB,, | Performed by: NURSE PRACTITIONER

## 2022-05-25 PROCEDURE — 3008F PR BODY MASS INDEX (BMI) DOCUMENTED: ICD-10-PCS | Mod: CPTII,S$GLB,, | Performed by: NURSE PRACTITIONER

## 2022-05-25 PROCEDURE — 1159F PR MEDICATION LIST DOCUMENTED IN MEDICAL RECORD: ICD-10-PCS | Mod: CPTII,S$GLB,, | Performed by: NURSE PRACTITIONER

## 2022-05-25 PROCEDURE — 1126F PR PAIN SEVERITY QUANTIFIED, NO PAIN PRESENT: ICD-10-PCS | Mod: CPTII,S$GLB,, | Performed by: NURSE PRACTITIONER

## 2022-05-25 PROCEDURE — 1159F MED LIST DOCD IN RCRD: CPT | Mod: CPTII,S$GLB,, | Performed by: NURSE PRACTITIONER

## 2022-05-25 PROCEDURE — 1160F PR REVIEW ALL MEDS BY PRESCRIBER/CLIN PHARMACIST DOCUMENTED: ICD-10-PCS | Mod: CPTII,S$GLB,, | Performed by: NURSE PRACTITIONER

## 2022-05-25 PROCEDURE — 4010F PR ACE/ARB THEARPY RXD/TAKEN: ICD-10-PCS | Mod: CPTII,S$GLB,, | Performed by: NURSE PRACTITIONER

## 2022-05-25 PROCEDURE — 1160F RVW MEDS BY RX/DR IN RCRD: CPT | Mod: CPTII,S$GLB,, | Performed by: NURSE PRACTITIONER

## 2022-05-25 PROCEDURE — 99999 PR PBB SHADOW E&M-EST. PATIENT-LVL IV: CPT | Mod: PBBFAC,,, | Performed by: NURSE PRACTITIONER

## 2022-05-25 PROCEDURE — 99215 OFFICE O/P EST HI 40 MIN: CPT | Mod: S$GLB,,, | Performed by: NURSE PRACTITIONER

## 2022-05-25 RX ORDER — LAMOTRIGINE 300 MG/1
300 TABLET, EXTENDED RELEASE ORAL DAILY
COMMUNITY
Start: 2022-05-20 | End: 2022-07-15 | Stop reason: SDUPTHER

## 2022-05-25 RX ORDER — VENLAFAXINE HYDROCHLORIDE 75 MG/1
150 CAPSULE, EXTENDED RELEASE ORAL DAILY
COMMUNITY
Start: 2022-05-20 | End: 2022-06-10

## 2022-05-25 RX ORDER — MELOXICAM 15 MG/1
TABLET ORAL
COMMUNITY
Start: 2022-05-10 | End: 2022-06-10

## 2022-05-25 RX ORDER — CELECOXIB 200 MG/1
CAPSULE ORAL DAILY
COMMUNITY
Start: 2022-05-20 | End: 2024-01-08

## 2022-05-25 RX ORDER — CLOPIDOGREL BISULFATE 75 MG/1
75 TABLET ORAL DAILY
COMMUNITY
Start: 2022-05-20 | End: 2022-06-10

## 2022-05-25 RX ORDER — GABAPENTIN 100 MG/1
CAPSULE ORAL DAILY
COMMUNITY
Start: 2022-05-20 | End: 2022-12-12

## 2022-05-25 NOTE — ASSESSMENT & PLAN NOTE
Pt follows Dr. Lira for seizure management.   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    She is maintained on Lamictal  mg daily and doing well.   Keppra has been discontinued.    Check serologies at next visit for medication monitoring purposes.   State law as it pertains to driving and seizure safety has previously been discussed.

## 2022-05-25 NOTE — ASSESSMENT & PLAN NOTE
Patient is a 66 y/o female that presents or follow up of TIA in early May.   She reports sudden expressive aphasia during PT appt. BP also reported to be very elevated.   Symptoms resolved later that day.   CTH neg acute  MRI brain neg for stroke   - WWOUT incidental finding of small meningioma  TTE without PFO  CTA without LVO    Pt previously taking ASA every other day. While inpt she was started on ASA QD as well as Plavix 75 mg QD. I have advised she take DAPT x 21 days followed by ASA 81 mg QD for secondary stroke prevention.     Control stroke risk factors:  BP management as per PCP  Cont statin for LDL <70  A1c at goal  Diet modification    Stroke education provided.
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
## 2022-05-25 NOTE — PATIENT INSTRUCTIONS
"TIA  A transient ischemic attack (TIA) is like a stroke in that it causes the same symptoms as a stroke, but it does not damage the brain. TIAs happen when an artery in the brain gets clogged, or closes off, and then reopens on its own. This can happen if a blood clot forms and then moves away or dissolves.    The symptoms of a TIA are the same as the symptoms of a stroke and can include:  ?Weakness or numbness of the hand, tongue, cheek, face, arm, or leg  ?Trouble speaking normally or at all  ?Trouble seeing clearly with 1 or both eyes  With a stroke, the symptoms are long-lasting, while a TIA goes away quickly    A TIA does not cause permanent damage to the brain like a stroke does. But the symptoms are the same. This can make it hard to tell if a person is having a TIA or a stroke.    Just like a stroke, a TIA can happen when the blood supply to part of the brain is cut off for a short time. This can happen if a blood clot blocks the flow of blood through an artery in the brain and then dissolves or moves away. It can also happen if one of the small arteries in the brain begins to close off from the effects of high blood pressure.    Just think of the word "FAST" Each letter in the word stands for 1 of the signs you should watch for:  ?Face - Does the person's face look uneven or droop on 1 side?  ?Arm - Does the person have weakness or numbness in 1 or both arms? Does 1 arm drift down if the person tries to hold both arms out?  ?Speech - Is the person having trouble speaking? Does his or her speech sound strange?  ?Time - If you notice any of these stroke signs, even if they go away, call for an ambulance (in the [place] and [place], dial [id number]). You need to act FAST. The sooner treatment begins, the better the chances of recovery.  Other symptoms can also be signs of a stroke. These include trouble seeing in one or both eyes, trouble walking, and loss of balance or coordination.      TIAs are not usually treated " directly. Instead, treatments are directed at reducing the risk that a person will go on to have a full-blown stroke. To lower your risk of stroke, you should:  ?Take your medicines exactly as directed. Medicines that are especially important in preventing strokes include:  Medicines to lower blood pressure  Medicines called statins, which lower cholesterol  Medicines to prevent blood clots, such as aspirin or blood thinners  Medicines that help to keep your blood sugar as close to normal as possible (if you have diabetes)  ?Make lifestyle changes:  Stop smoking, if you smoke  Get regular exercise (if your doctor says it's safe) for at least 30 minutes a day on most days of the week  Lose weight, if you are overweight  Eat a diet rich in fruits, vegetables, and low-fat dairy products, and low in meats, sweets, and refined grains (such as white bread or white rice)  Eat less salt (sodium)  Limit the amount of alcohol you drink  -If you are a woman, do not drink more than 1 drink a day  -If you are a man, do not drink more than 2 drinks a day

## 2022-05-25 NOTE — ASSESSMENT & PLAN NOTE
Very small meningioma noted on recent imaging  Educated pt on this   She would like referral to [facility] for eval.

## 2022-05-31 ENCOUNTER — TELEPHONE (OUTPATIENT)
Dept: NEUROSURGERY | Facility: CLINIC | Age: 67
End: 2022-05-31
Payer: MEDICARE

## 2022-05-31 NOTE — TELEPHONE ENCOUNTER
Called patient in regards to appointment scheduled on 6/8/22 with Dr. Igor Jackson. Please make sure patient has recent imaging for her appointment. No answer. Left voicemail to call clinic to confirm.

## 2022-06-06 ENCOUNTER — LAB VISIT (OUTPATIENT)
Dept: LAB | Facility: HOSPITAL | Age: 67
End: 2022-06-06
Attending: INTERNAL MEDICINE
Payer: MEDICARE

## 2022-06-06 DIAGNOSIS — I10 PRIMARY HYPERTENSION: ICD-10-CM

## 2022-06-06 LAB
ALBUMIN SERPL BCP-MCNC: 4.1 G/DL (ref 3.5–5.2)
ALP SERPL-CCNC: 88 U/L (ref 55–135)
ALT SERPL W/O P-5'-P-CCNC: 23 U/L (ref 10–44)
ANION GAP SERPL CALC-SCNC: 8 MMOL/L (ref 8–16)
AST SERPL-CCNC: 27 U/L (ref 10–40)
BASOPHILS # BLD AUTO: 0.05 K/UL (ref 0–0.2)
BASOPHILS NFR BLD: 1.2 % (ref 0–1.9)
BILIRUB SERPL-MCNC: 0.5 MG/DL (ref 0.1–1)
BUN SERPL-MCNC: 19 MG/DL (ref 8–23)
CALCIUM SERPL-MCNC: 9.6 MG/DL (ref 8.7–10.5)
CHLORIDE SERPL-SCNC: 105 MMOL/L (ref 95–110)
CHOLEST SERPL-MCNC: 166 MG/DL (ref 120–199)
CHOLEST/HDLC SERPL: 2 {RATIO} (ref 2–5)
CO2 SERPL-SCNC: 30 MMOL/L (ref 23–29)
CREAT SERPL-MCNC: 0.8 MG/DL (ref 0.5–1.4)
DIFFERENTIAL METHOD: ABNORMAL
EOSINOPHIL # BLD AUTO: 0.1 K/UL (ref 0–0.5)
EOSINOPHIL NFR BLD: 2.7 % (ref 0–8)
ERYTHROCYTE [DISTWIDTH] IN BLOOD BY AUTOMATED COUNT: 12.5 % (ref 11.5–14.5)
EST. GFR  (AFRICAN AMERICAN): >60 ML/MIN/1.73 M^2
EST. GFR  (NON AFRICAN AMERICAN): >60 ML/MIN/1.73 M^2
GLUCOSE SERPL-MCNC: 92 MG/DL (ref 70–110)
HCT VFR BLD AUTO: 35.1 % (ref 37–48.5)
HDLC SERPL-MCNC: 81 MG/DL (ref 40–75)
HDLC SERPL: 48.8 % (ref 20–50)
HGB BLD-MCNC: 11.5 G/DL (ref 12–16)
IMM GRANULOCYTES # BLD AUTO: 0.01 K/UL (ref 0–0.04)
IMM GRANULOCYTES NFR BLD AUTO: 0.2 % (ref 0–0.5)
LDLC SERPL CALC-MCNC: 77.4 MG/DL (ref 63–159)
LYMPHOCYTES # BLD AUTO: 1.2 K/UL (ref 1–4.8)
LYMPHOCYTES NFR BLD: 29.9 % (ref 18–48)
MCH RBC QN AUTO: 29.6 PG (ref 27–31)
MCHC RBC AUTO-ENTMCNC: 32.8 G/DL (ref 32–36)
MCV RBC AUTO: 90 FL (ref 82–98)
MONOCYTES # BLD AUTO: 0.3 K/UL (ref 0.3–1)
MONOCYTES NFR BLD: 8.3 % (ref 4–15)
NEUTROPHILS # BLD AUTO: 2.4 K/UL (ref 1.8–7.7)
NEUTROPHILS NFR BLD: 57.7 % (ref 38–73)
NONHDLC SERPL-MCNC: 85 MG/DL
NRBC BLD-RTO: 0 /100 WBC
PLATELET # BLD AUTO: 258 K/UL (ref 150–450)
PMV BLD AUTO: 11.8 FL (ref 9.2–12.9)
POTASSIUM SERPL-SCNC: 3.6 MMOL/L (ref 3.5–5.1)
PROT SERPL-MCNC: 6.4 G/DL (ref 6–8.4)
RBC # BLD AUTO: 3.89 M/UL (ref 4–5.4)
SODIUM SERPL-SCNC: 143 MMOL/L (ref 136–145)
TRIGL SERPL-MCNC: 38 MG/DL (ref 30–150)
TSH SERPL DL<=0.005 MIU/L-ACNC: 1.48 UIU/ML (ref 0.4–4)
WBC # BLD AUTO: 4.08 K/UL (ref 3.9–12.7)

## 2022-06-06 PROCEDURE — 84443 ASSAY THYROID STIM HORMONE: CPT | Performed by: INTERNAL MEDICINE

## 2022-06-06 PROCEDURE — 85025 COMPLETE CBC W/AUTO DIFF WBC: CPT | Performed by: INTERNAL MEDICINE

## 2022-06-06 PROCEDURE — 80061 LIPID PANEL: CPT | Performed by: INTERNAL MEDICINE

## 2022-06-06 PROCEDURE — 36415 COLL VENOUS BLD VENIPUNCTURE: CPT | Mod: PO | Performed by: INTERNAL MEDICINE

## 2022-06-06 PROCEDURE — 80053 COMPREHEN METABOLIC PANEL: CPT | Performed by: INTERNAL MEDICINE

## 2022-06-07 ENCOUNTER — TELEPHONE (OUTPATIENT)
Dept: NEUROSURGERY | Facility: CLINIC | Age: 67
End: 2022-06-07
Payer: MEDICARE

## 2022-06-07 ENCOUNTER — PATIENT MESSAGE (OUTPATIENT)
Dept: NEUROSURGERY | Facility: CLINIC | Age: 67
End: 2022-06-07
Payer: MEDICARE

## 2022-06-08 ENCOUNTER — TELEPHONE (OUTPATIENT)
Dept: RADIOLOGY | Facility: HOSPITAL | Age: 67
End: 2022-06-08
Payer: MEDICARE

## 2022-06-10 ENCOUNTER — OFFICE VISIT (OUTPATIENT)
Dept: FAMILY MEDICINE | Facility: CLINIC | Age: 67
End: 2022-06-10
Payer: MEDICARE

## 2022-06-10 VITALS
OXYGEN SATURATION: 98 % | HEART RATE: 65 BPM | RESPIRATION RATE: 12 BRPM | HEIGHT: 63 IN | DIASTOLIC BLOOD PRESSURE: 72 MMHG | TEMPERATURE: 98 F | BODY MASS INDEX: 22.86 KG/M2 | WEIGHT: 129 LBS | SYSTOLIC BLOOD PRESSURE: 124 MMHG

## 2022-06-10 DIAGNOSIS — Z12.31 ENCOUNTER FOR SCREENING MAMMOGRAM FOR MALIGNANT NEOPLASM OF BREAST: ICD-10-CM

## 2022-06-10 DIAGNOSIS — I10 PRIMARY HYPERTENSION: ICD-10-CM

## 2022-06-10 DIAGNOSIS — D32.9 MENINGIOMA: ICD-10-CM

## 2022-06-10 DIAGNOSIS — F33.0 MAJOR DEPRESSIVE DISORDER, RECURRENT, MILD: ICD-10-CM

## 2022-06-10 DIAGNOSIS — Z86.73 HISTORY OF TIA (TRANSIENT ISCHEMIC ATTACK): ICD-10-CM

## 2022-06-10 DIAGNOSIS — M19.049 HAND ARTHRITIS: ICD-10-CM

## 2022-06-10 DIAGNOSIS — Z85.72 HISTORY OF LYMPHOMA: ICD-10-CM

## 2022-06-10 DIAGNOSIS — G40.909 SEIZURE DISORDER: ICD-10-CM

## 2022-06-10 DIAGNOSIS — F41.1 GAD (GENERALIZED ANXIETY DISORDER): ICD-10-CM

## 2022-06-10 DIAGNOSIS — E78.5 HYPERLIPIDEMIA, UNSPECIFIED HYPERLIPIDEMIA TYPE: ICD-10-CM

## 2022-06-10 DIAGNOSIS — I25.10 CORONARY ARTERY DISEASE INVOLVING NATIVE CORONARY ARTERY OF NATIVE HEART WITHOUT ANGINA PECTORIS: Primary | ICD-10-CM

## 2022-06-10 DIAGNOSIS — I50.22 CHRONIC SYSTOLIC HEART FAILURE: ICD-10-CM

## 2022-06-10 DIAGNOSIS — M75.111 NONTRAUMATIC INCOMPLETE TEAR OF RIGHT ROTATOR CUFF: ICD-10-CM

## 2022-06-10 DIAGNOSIS — K76.0 NAFLD (NONALCOHOLIC FATTY LIVER DISEASE): ICD-10-CM

## 2022-06-10 PROCEDURE — 3288F PR FALLS RISK ASSESSMENT DOCUMENTED: ICD-10-PCS | Mod: CPTII,S$GLB,, | Performed by: INTERNAL MEDICINE

## 2022-06-10 PROCEDURE — 1159F PR MEDICATION LIST DOCUMENTED IN MEDICAL RECORD: ICD-10-PCS | Mod: CPTII,S$GLB,, | Performed by: INTERNAL MEDICINE

## 2022-06-10 PROCEDURE — 1126F PR PAIN SEVERITY QUANTIFIED, NO PAIN PRESENT: ICD-10-PCS | Mod: CPTII,S$GLB,, | Performed by: INTERNAL MEDICINE

## 2022-06-10 PROCEDURE — 99214 PR OFFICE/OUTPT VISIT, EST, LEVL IV, 30-39 MIN: ICD-10-PCS | Mod: S$GLB,,, | Performed by: INTERNAL MEDICINE

## 2022-06-10 PROCEDURE — 99214 OFFICE O/P EST MOD 30 MIN: CPT | Mod: S$GLB,,, | Performed by: INTERNAL MEDICINE

## 2022-06-10 PROCEDURE — 3288F FALL RISK ASSESSMENT DOCD: CPT | Mod: CPTII,S$GLB,, | Performed by: INTERNAL MEDICINE

## 2022-06-10 PROCEDURE — 1101F PR PT FALLS ASSESS DOC 0-1 FALLS W/OUT INJ PAST YR: ICD-10-PCS | Mod: CPTII,S$GLB,, | Performed by: INTERNAL MEDICINE

## 2022-06-10 PROCEDURE — 4010F ACE/ARB THERAPY RXD/TAKEN: CPT | Mod: CPTII,S$GLB,, | Performed by: INTERNAL MEDICINE

## 2022-06-10 PROCEDURE — 1101F PT FALLS ASSESS-DOCD LE1/YR: CPT | Mod: CPTII,S$GLB,, | Performed by: INTERNAL MEDICINE

## 2022-06-10 PROCEDURE — 3074F SYST BP LT 130 MM HG: CPT | Mod: CPTII,S$GLB,, | Performed by: INTERNAL MEDICINE

## 2022-06-10 PROCEDURE — 1160F PR REVIEW ALL MEDS BY PRESCRIBER/CLIN PHARMACIST DOCUMENTED: ICD-10-PCS | Mod: CPTII,S$GLB,, | Performed by: INTERNAL MEDICINE

## 2022-06-10 PROCEDURE — 3008F PR BODY MASS INDEX (BMI) DOCUMENTED: ICD-10-PCS | Mod: CPTII,S$GLB,, | Performed by: INTERNAL MEDICINE

## 2022-06-10 PROCEDURE — 1160F RVW MEDS BY RX/DR IN RCRD: CPT | Mod: CPTII,S$GLB,, | Performed by: INTERNAL MEDICINE

## 2022-06-10 PROCEDURE — 3074F PR MOST RECENT SYSTOLIC BLOOD PRESSURE < 130 MM HG: ICD-10-PCS | Mod: CPTII,S$GLB,, | Performed by: INTERNAL MEDICINE

## 2022-06-10 PROCEDURE — 1126F AMNT PAIN NOTED NONE PRSNT: CPT | Mod: CPTII,S$GLB,, | Performed by: INTERNAL MEDICINE

## 2022-06-10 PROCEDURE — 4010F PR ACE/ARB THEARPY RXD/TAKEN: ICD-10-PCS | Mod: CPTII,S$GLB,, | Performed by: INTERNAL MEDICINE

## 2022-06-10 PROCEDURE — 3078F DIAST BP <80 MM HG: CPT | Mod: CPTII,S$GLB,, | Performed by: INTERNAL MEDICINE

## 2022-06-10 PROCEDURE — 1159F MED LIST DOCD IN RCRD: CPT | Mod: CPTII,S$GLB,, | Performed by: INTERNAL MEDICINE

## 2022-06-10 PROCEDURE — 3078F PR MOST RECENT DIASTOLIC BLOOD PRESSURE < 80 MM HG: ICD-10-PCS | Mod: CPTII,S$GLB,, | Performed by: INTERNAL MEDICINE

## 2022-06-10 PROCEDURE — 3008F BODY MASS INDEX DOCD: CPT | Mod: CPTII,S$GLB,, | Performed by: INTERNAL MEDICINE

## 2022-06-10 NOTE — PROGRESS NOTES
Subjective:       Patient ID: Keena Banks is a 67 y.o. female.    Medication List with Changes/Refills   Current Medications    ASPIRIN (ECOTRIN) 81 MG EC TABLET    Take 81 mg by mouth. Every other day    CELECOXIB (CELEBREX) 200 MG CAPSULE    Take by mouth once daily.    FLUTICASONE (FLONASE) 50 MCG/ACTUATION NASAL SPRAY    2 sprays by Each Nare route once daily.    GABAPENTIN (NEURONTIN) 100 MG CAPSULE    Take by mouth once daily.    HYDROCHLOROTHIAZIDE (HYDRODIURIL) 25 MG TABLET    TAKE 1 TABLET DAILY    IBUPROFEN (ADVIL,MOTRIN) 200 MG TABLET    Take 400 mg by mouth every 6 (six) hours as needed for Pain.    LAMOTRIGINE XR (LAMICTAL XR) 300 MG XR TABLET    Take 300 mg by mouth once daily.    LOSARTAN (COZAAR) 50 MG TABLET    TAKE 1 TABLET DAILY    MULTIVITAMIN WITH MINERALS TABLET    Take 1 tablet by mouth once daily.    ROSUVASTATIN (CRESTOR) 20 MG TABLET    TAKE 1 TABLET BY MOUTH ONCE DAILY    VENLAFAXINE (EFFEXOR-XR) 150 MG CP24    Take 1 capsule (150 mg total) by mouth once daily.   Discontinued Medications    CLOPIDOGREL (PLAVIX) 75 MG TABLET    Take 75 mg by mouth once daily.    LEVETIRACETAM (KEPPRA) 500 MG TAB    Take 1 tablet (500 mg total) by mouth 2 (two) times daily.    MELOXICAM (MOBIC) 15 MG TABLET    Take by mouth.    VENLAFAXINE (EFFEXOR-XR) 75 MG 24 HR CAPSULE    Take 150 mg by mouth once daily.       Chief Complaint: Follow-up (6mo)  She is here today to f/u on her chronic medical issues. She had multiple admissions to hospital since her last visit.     She was admitted to [facility] in 2/2022 for grand mal seizure after switching her seizure medication from lamictal to keppra. She made this switch for concern that lamictal was causing her to be angry.  During her stay there was concern about her heart. She was seen as an outpatient and had an echo (EF 45% per patient) and cardiac cath that per patient showed non obstructive disease with 30% blockage in one vessel.  Advised to continue  statin and change diet.     She was admitted to [facility] on 5/9/2022 (we have records) for aphasia and elevated BP that lasted a day then resolved. MRI showed 0.8 x 0.4 cm extra-axial focus of enhancement along the inner table of the left parietal calvarium without associated mass effect and no acute intracranial pathology.  CTA of the head and  neck showed no hemodynamically significant stenosis or aneurysms.  CT head was unremarkable. She was d/c home on plavix for 21 days.       She has hypertension and is taking HCTZ 25 mg qday and losartan 50 mg qday. She has no known CAD. She denies chest pain or shortness of breath.      She has hyperlipidemia controlled on crestor 20 mg qday. Her lipids on 6/2022 were 166/38/81/77. She is taking aspirin daily.      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 She is taking lamictal  mg qday. She is doing very well. She has not had any further seizures since restarting her lamictal. She does say the lamictal affects her memory and she is more irritable but no other side effects. She had neuropsych testing on 7/2018 which showed no memory impairment but mild depression. She was seen by neurology on 5/2022.      She has a meningioma and is scheduled to see NS next month for evaluation. Seen on MRI on 5/2022. She denies any headaches, nausea or vision changes.      She had an ultrasound showing fatty infiltration of her liver. She has normal LFTs.       She had depression with anxiety and is taking venlafaxine 175 mg daily which she decreased about 3 weeks. She feels she is doing well this lower dose. She is sleeping well. She denies any anxiety symptoms.No suicidal ideations.      She has a history of NHL(stage III A diffuse large B cell lymphoma) s/p 8 cycles of chemotherapy with CHOP/rituxan. She was  seen by oncology annual and her last appt was on 9/2021.  She continues to be in remission and will  f/u in one year.     She has continues pain and arthritis in her hands. She was seen by rheumatology on 7/2019 and dx with erosive ostearthritis.   She reports increasing pain especially in the middle finger of her right hand. Her DIP joints remain swollen and painful. Xray of her hands on 11/2020 showed advanced degenerative change in the interphalangeal joints of both hands, mostly involving the DIP joints but also the PIP joints at several sites.  This has progressed involving the left 2nd DIP joint and right 3rd DIP joint.  She takes celebrex as needed with CBD oil.     She complains of pain in her left shoulder and was diagnosed with a rotator cuff tear by orthopedics on 5/2022. She received a steroid injection and started PT. She continues on celebrex with CBD oil.       She lives with her  and granddaughter (plays golf). Her  recently diagnosed with invasive bladder cancer.  She exercises regularly.  She tries to eat healthy.      Colonoscopy---8/2016 repeat in 8 years  Mammogram----6/2021 neg  DEXA-----11/2021 normal   Pap-----once since ANDREW neg  Tdap---5/2019  Influenza vaccine----12/2021  Pneumovax 23----11/2015  Prevnar 13----12/2016  Shingrex--- 6/2018 , 9/2020   Covid vaccine---UTD x 3 doses       Review of Systems   Constitutional: Negative for appetite change, fatigue, fever and unexpected weight change.   HENT: Negative for congestion, ear pain, hearing loss, sore throat and trouble swallowing.    Eyes: Negative for pain and visual disturbance.   Respiratory: Negative for cough, chest tightness, shortness of breath and wheezing.    Cardiovascular: Negative for chest pain, palpitations and leg swelling.   Gastrointestinal: Negative for abdominal pain, blood in stool, constipation, diarrhea, nausea and vomiting.   Endocrine: Negative for polyuria.   Genitourinary: Negative for dysuria and hematuria.   Musculoskeletal: Negative for arthralgias, back pain and myalgias.   Skin: Negative for  "rash.   Neurological: Negative for dizziness, weakness, numbness and headaches.   Hematological: Does not bruise/bleed easily.   Psychiatric/Behavioral: Positive for dysphoric mood. Negative for sleep disturbance and suicidal ideas. The patient is not nervous/anxious.        Objective:      Vitals:    06/10/22 1036   BP: 124/72   Pulse: 65   Resp: 12   Temp: 98.4 °F (36.9 °C)   TempSrc: Temporal   SpO2: 98%   Weight: 58.5 kg (128 lb 15.5 oz)   Height: 5' 3" (1.6 m)     Body mass index is 22.85 kg/m².  Physical Exam    General appearance: No acute distress, cooperative  Eyes: PERRL, EOMI, conjunctiva clear  Ears: normal external ear and pinna, tm clear without drainage, canals clear  Nose: Normal mucosa without drainage  Throat: no exudates or erythema, tonsils not enlarged  Mouth: no sores or lesions, moist mucous membranes  Neck: FROM, soft, supple, no thyromegaly, no bruits  Lymph: no anterior or posterior cervical adenopathy  Heart::  Regular rate and rhythm, no murmur  Lung: Clear to ascultation bilaterally, no wheezing, no rales, no rhonchi, no distress  Abdomen: Soft, nontender, no distention, no hepatosplenomegaly, bowel sounds normal, no guarding, no rebound, no peritoneal signs  Skin: no rashes, no lesions  Extremities: no edema, no cyanosis  Neuro: CN 2-12 intact, 5/5 muscle strength upper and lower extremity bilaterally, 2+ DTRs UE and LE bilaterally, normal gait, finger to nose intact, negative romberg  Peripheral pulses: 2+ pedal pulses bilaterally, good perfusion and color  Musculoskeletal: FROM, good strenth, no tenderness  Joint: normal appearance, no swelling, no warmth, deformity in bilateral hands     Assessment:       1. Coronary artery disease involving native coronary artery of native heart without angina pectoris    2. Chronic systolic heart failure    3. Primary hypertension    4. Hyperlipidemia, unspecified hyperlipidemia type    5. History of TIA (transient ischemic attack)    6. NAFLD " (nonalcoholic fatty liver disease)    7. Seizure disorder    8. History of lymphoma    9. Meningioma    10. Major depressive disorder, recurrent, mild    11. SORAYA (generalized anxiety disorder)    12. Hand arthritis    13. Nontraumatic incomplete tear of right rotator cuff    14. Encounter for screening mammogram for malignant neoplasm of breast        Plan:       Coronary artery disease involving native coronary artery of native heart without angina pectoris  Will get records from cardiology for echo and recent cath. Continue statin and aspirin.    Chronic systolic heart failure  Well compensated and self reported low EF. Will get records    Primary hypertension  Well controlled and continue current regimen.     Hyperlipidemia, unspecified hyperlipidemia type  Good control on crestor and aspirin.    History of TIA (transient ischemic attack)  No residual deficits. Continue statin and aspriin.    NAFLD (nonalcoholic fatty liver disease)  Stable and continue to monitor.    Seizure disorder  Doing well on lamictal.  Continue to follow with neurology.    History of lymphoma  No active disease    Meningioma  Asymptomatic and due to establish with NS    Major depressive disorder, recurrent, mild  Controlled on lower dose of effexor    SORAYA (generalized anxiety disorder)  Doing well on lower dose of effexor    Hand arthritis  STable and continue celebrex as needed    Nontraumatic incomplete tear of right rotator cuff  S/p steroid injection and continue to follow with orthopedics.    Encounter for screening mammogram for malignant neoplasm of breast  -     Mammo Digital Screening Bil[facility] w/ [place]; Future; Expected date: 06/10/2022    Follow up in about 6 months (around 12/10/2022) for chronic medical issues.

## 2022-06-28 ENCOUNTER — HOSPITAL ENCOUNTER (OUTPATIENT)
Dept: RADIOLOGY | Facility: HOSPITAL | Age: 67
Discharge: HOME | End: 2022-06-28
Attending: INTERNAL MEDICINE
Payer: MEDICARE

## 2022-06-28 ENCOUNTER — OFFICE VISIT (OUTPATIENT)
Dept: NEUROSURGERY | Facility: CLINIC | Age: 67
End: 2022-06-28
Payer: MEDICARE

## 2022-06-28 VITALS
DIASTOLIC BLOOD PRESSURE: 83 MMHG | WEIGHT: 129 LBS | RESPIRATION RATE: 18 BRPM | HEART RATE: 66 BPM | SYSTOLIC BLOOD PRESSURE: 139 MMHG | BODY MASS INDEX: 22.86 KG/M2 | HEIGHT: 63 IN

## 2022-06-28 DIAGNOSIS — Z12.31 ENCOUNTER FOR SCREENING MAMMOGRAM FOR MALIGNANT NEOPLASM OF BREAST: ICD-10-CM

## 2022-06-28 DIAGNOSIS — D32.9 MENINGIOMA: ICD-10-CM

## 2022-06-28 PROCEDURE — 3075F PR MOST RECENT SYSTOLIC BLOOD PRESS GE 130-139MM HG: ICD-10-PCS | Mod: CPTII,S$GLB,, | Performed by: NURSE PRACTITIONER

## 2022-06-28 PROCEDURE — 77067 MAMMO DIGITAL SCREENING BILAT WITH TOMO: ICD-10-PCS | Mod: 26,,, | Performed by: RADIOLOGY

## 2022-06-28 PROCEDURE — 77063 MAMMO DIGITAL SCREENING BILAT WITH TOMO: ICD-10-PCS | Mod: 26,,, | Performed by: RADIOLOGY

## 2022-06-28 PROCEDURE — 4010F ACE/ARB THERAPY RXD/TAKEN: CPT | Mod: CPTII,S$GLB,, | Performed by: NURSE PRACTITIONER

## 2022-06-28 PROCEDURE — 1126F PR PAIN SEVERITY QUANTIFIED, NO PAIN PRESENT: ICD-10-PCS | Mod: CPTII,S$GLB,, | Performed by: NURSE PRACTITIONER

## 2022-06-28 PROCEDURE — 3008F BODY MASS INDEX DOCD: CPT | Mod: CPTII,S$GLB,, | Performed by: NURSE PRACTITIONER

## 2022-06-28 PROCEDURE — 77063 BREAST TOMOSYNTHESIS BI: CPT | Mod: 26,,, | Performed by: RADIOLOGY

## 2022-06-28 PROCEDURE — 3288F PR FALLS RISK ASSESSMENT DOCUMENTED: ICD-10-PCS | Mod: CPTII,S$GLB,, | Performed by: NURSE PRACTITIONER

## 2022-06-28 PROCEDURE — 3079F DIAST BP 80-89 MM HG: CPT | Mod: CPTII,S$GLB,, | Performed by: NURSE PRACTITIONER

## 2022-06-28 PROCEDURE — 3075F SYST BP GE 130 - 139MM HG: CPT | Mod: CPTII,S$GLB,, | Performed by: NURSE PRACTITIONER

## 2022-06-28 PROCEDURE — 3288F FALL RISK ASSESSMENT DOCD: CPT | Mod: CPTII,S$GLB,, | Performed by: NURSE PRACTITIONER

## 2022-06-28 PROCEDURE — 99214 PR OFFICE/OUTPT VISIT, EST, LEVL IV, 30-39 MIN: ICD-10-PCS | Mod: S$GLB,,, | Performed by: NURSE PRACTITIONER

## 2022-06-28 PROCEDURE — 1159F PR MEDICATION LIST DOCUMENTED IN MEDICAL RECORD: ICD-10-PCS | Mod: CPTII,S$GLB,, | Performed by: NURSE PRACTITIONER

## 2022-06-28 PROCEDURE — 1126F AMNT PAIN NOTED NONE PRSNT: CPT | Mod: CPTII,S$GLB,, | Performed by: NURSE PRACTITIONER

## 2022-06-28 PROCEDURE — 77063 BREAST TOMOSYNTHESIS BI: CPT | Mod: TC,PO

## 2022-06-28 PROCEDURE — 1101F PR PT FALLS ASSESS DOC 0-1 FALLS W/OUT INJ PAST YR: ICD-10-PCS | Mod: CPTII,S$GLB,, | Performed by: NURSE PRACTITIONER

## 2022-06-28 PROCEDURE — 99214 OFFICE O/P EST MOD 30 MIN: CPT | Mod: S$GLB,,, | Performed by: NURSE PRACTITIONER

## 2022-06-28 PROCEDURE — 1159F MED LIST DOCD IN RCRD: CPT | Mod: CPTII,S$GLB,, | Performed by: NURSE PRACTITIONER

## 2022-06-28 PROCEDURE — 3008F PR BODY MASS INDEX (BMI) DOCUMENTED: ICD-10-PCS | Mod: CPTII,S$GLB,, | Performed by: NURSE PRACTITIONER

## 2022-06-28 PROCEDURE — 3079F PR MOST RECENT DIASTOLIC BLOOD PRESSURE 80-89 MM HG: ICD-10-PCS | Mod: CPTII,S$GLB,, | Performed by: NURSE PRACTITIONER

## 2022-06-28 PROCEDURE — 4010F PR ACE/ARB THEARPY RXD/TAKEN: ICD-10-PCS | Mod: CPTII,S$GLB,, | Performed by: NURSE PRACTITIONER

## 2022-06-28 PROCEDURE — 77067 SCR MAMMO BI INCL CAD: CPT | Mod: 26,,, | Performed by: RADIOLOGY

## 2022-06-28 PROCEDURE — 1101F PT FALLS ASSESS-DOCD LE1/YR: CPT | Mod: CPTII,S$GLB,, | Performed by: NURSE PRACTITIONER

## 2022-06-28 NOTE — PROGRESS NOTES
Neurosurgery History & Physical    Patient ID: Keena Banks is a 67 y.o. female.    Chief Complaint   Patient presents with    meningioma     Pt had stroke ~6 weeks ago and was diagnosed with meningioma after having an MRI. Pt states she is having no symptoms at this time.        History of Present Illness:   Ms. Banks is a 67 year old female with HTN, depression, seizures, NHL in remission since 2013 who presents today for evaluation of brain lesion, probable meningioma.  She was recently evaluated at [facility] in May 2022 following a hypertensive episode while at physical therapy.  Her BP was 180s over 100s.  During this time she experienced some expressive aphasia.  This gradually improved over the course of the day and by the next morning she was back to her baseline.  Imaging was negative for stroke or other acute abnormality.  It did however incidentally point out a small dural-based lesion.    She has followed up with Neurology since discharge who reviewed imaging and recommended neurosurgery follow-up to discuss options for the lesion.  She has occasional headache but not often. She denies weakness, numbness/tingling.    She is established with Dr. Lira for seizures.  She is maintained on Lamictal for seizure control.  She attempted to wean from the medication several months back and subsequently had an isolated seizure episode.  Since resuming her medication she denies further seizure activity.  Her seizures began in 2013 following treatment for non-Hodgkin's lymphoma.  It is unclear the etiology of his seizure onset.    Review of Systems   Constitutional: Negative for activity change, fatigue and fever.   HENT: Negative for hearing loss, trouble swallowing and voice change.    Eyes: Negative for photophobia and visual disturbance.   Respiratory: Negative for cough.    Cardiovascular: Negative for chest pain.   Gastrointestinal: Negative for nausea.   Endocrine: Negative for cold intolerance  and heat intolerance.   Genitourinary: Negative for difficulty urinating.   Musculoskeletal: Negative for back pain, gait problem and neck pain.   Skin: Negative for wound.   Allergic/Immunologic: Negative for immunocompromised state.   Neurological: Positive for seizures and headaches. Negative for weakness and numbness.   Psychiatric/Behavioral: Negative for confusion.       Past Medical History:   Diagnosis Date    Depression     anxiety and depression situational    Hypertension     Menopausal symptom     vaginal dryness and hot flashes    NHL (non-Hodgkin's lymphoma) 2013    s/p 8 cycles of CHOP/Rituxan    Osteoarthritis     both hands    Seasonal allergies     seasonal and animal    Seizures 2013    Started 11/2014 after treatment for NHL, MRI brain normal, Grand mal 10/2014 and 6/2014 started several months after chemo    Skin cancer 01/2020    TIA (transient ischemic attack) 05/09/2022    [facility]     Social History     Socioeconomic History    Marital status:    Occupational History    Occupation: works for [employer]   Tobacco Use    Smoking status: Never Smoker    Smokeless tobacco: Never Used   Substance and Sexual Activity    Alcohol use: No     Comment: Occasional, rare    Drug use: No    Sexual activity: Yes     Partners: Male     Birth control/protection: None, See Surgical Hx     Comment: hysterectomy     Family History   Problem Relation Age of Onset    Cancer Father         cardiac    Alzheimer's disease Mother     Cancer Paternal Uncle     No Known Problems Brother     No Known Problems Daughter     Alzheimer's disease Maternal Grandmother     Cancer Maternal Grandfather         prostate    Cancer Paternal Grandfather         lung    No Known Problems Daughter     Breast cancer Neg Hx      Review of patient's allergies indicates:   Allergen Reactions    Adhesive Rash    Sulfa (sulfonamide antibiotics) Rash       Current Outpatient  "Medications:     aspirin (ECOTRIN) 81 MG EC tablet, Take 81 mg by mouth. Every other day, Disp: , Rfl:     celecoxib (CELEBREX) 200 MG capsule, Take by mouth once daily., Disp: , Rfl:     fluticasone (FLONASE) 50 mcg/actuation nasal spray, 2 sprays by Each Nare route once daily., Disp: 1 Bottle, Rfl: 6    gabapentin (NEURONTIN) 100 MG capsule, Take by mouth once daily., Disp: , Rfl:     hydroCHLOROthiazide (HYDRODIURIL) 25 MG tablet, TAKE 1 TABLET DAILY, Disp: 90 tablet, Rfl: 3    ibuprofen (ADVIL,MOTRIN) 200 MG tablet, Take 400 mg by mouth every 6 (six) hours as needed for Pain., Disp: , Rfl:     lamotrigine XR (LAMICTAL XR) 300 mg XR tablet, Take 300 mg by mouth once daily., Disp: , Rfl:     losartan (COZAAR) 50 MG tablet, TAKE 1 TABLET DAILY, Disp: 90 tablet, Rfl: 3    multivitamin with minerals tablet, Take 1 tablet by mouth once daily., Disp: , Rfl:     rosuvastatin (CRESTOR) 20 MG tablet, TAKE 1 TABLET BY MOUTH ONCE DAILY, Disp: 90 tablet, Rfl: 1    venlafaxine (EFFEXOR-XR) 150 MG Cp24, Take 1 capsule (150 mg total) by mouth once daily., Disp: 90 capsule, Rfl: 1  Blood pressure 139/83, pulse 66, resp. rate 18, height 5' 3" (1.6 m), weight 58.5 kg (128 lb 15.5 oz).      Neurologic Exam     Mental Status   Oriented to person, place, and time.   Level of consciousness: alert    Cranial Nerves   Cranial nerves II through XII intact.     CN III, IV, VI   Pupils are equal, round, and reactive to light.    Motor Exam   Muscle bulk: normal  Overall muscle tone: normal  Right arm pronator drift: absent  Left arm pronator drift: absent    Strength   Strength 5/5 throughout.     Sensory Exam   Light touch normal.     Gait, Coordination, and Reflexes     Gait  Gait: normal    Coordination   Finger to nose coordination: normal    Reflexes   Right brachioradialis: 2+  Left brachioradialis: 2+  Right biceps: 2+  Left biceps: 2+  Right triceps: 2+  Left triceps: 2+  Right patellar: 2+  Left patellar: 2+  Right " achilles: 2+  Left achilles: 2+      Physical Exam  Vitals and nursing note reviewed.   Constitutional:       Appearance: She is well-developed.   HENT:      Head: Normocephalic and atraumatic.   Eyes:      Pupils: Pupils are equal, round, and reactive to light.   Pulmonary:      Effort: Pulmonary effort is normal.   Skin:     General: Skin is warm and dry.   Neurological:      Mental Status: She is alert and oriented to person, place, and time.      Cranial Nerves: Cranial nerves 2-12 are intact.      Motor: Motor strength is normal.      Coordination: Finger-Nose-Finger Test normal.      Gait: Gait is intact.      Deep Tendon Reflexes: Reflexes are normal and symmetric.      Reflex Scores:       Tricep reflexes are 2+ on the right side and 2+ on the left side.       Bicep reflexes are 2+ on the right side and 2+ on the left side.       Brachioradialis reflexes are 2+ on the right side and 2+ on the left side.       Patellar reflexes are 2+ on the right side and 2+ on the left side.       Achilles reflexes are 2+ on the right side and 2+ on the left side.        Imaging:   MRI brain w/wo contrast dated 5/2022 personally reviewed and discussed with the patient. There is a contrast enhancing dural based lesion in the left parietal region without surrounding edema or mass effect. This measures 8mm in greatest dimension. Upon review of historical imaging she had MRI brain without contrast in 2013. The lesion was visible at that time, however, it is difficult to compare with noncontrast images.    Assessment & Plan:   1. Meningioma  Ambulatory referral/consult to Neurosurgery       67 year old female with seizures, recent TIA episode and incidental left parietal lesion, likely meningioma, noted on imaging.  We discussed imaging at length as well as treatment options.  At this time I recommended monitoring with a repeat MRI brain with and without contrast in 1 years time.  The lesion was visible on imaging from 2013,  however in the absence of contrast on that scan would like to monitor closely initially.  We will plan to see her back in May of 2023 with a new MRI brain with without contrast.  She is agreeable to this plan and will contact our office with any questions or concerns in the meantime.

## 2022-06-29 DIAGNOSIS — I10 ESSENTIAL HYPERTENSION: ICD-10-CM

## 2022-06-29 RX ORDER — HYDROCHLOROTHIAZIDE 25 MG/1
25 TABLET ORAL DAILY
Qty: 90 TABLET | Refills: 3 | Status: SHIPPED | OUTPATIENT
Start: 2022-06-29 | End: 2023-05-29 | Stop reason: SDUPTHER

## 2022-06-29 NOTE — TELEPHONE ENCOUNTER
No new care gaps identified.  [facility] Embedded Care Gaps. Reference number: [id number]. 6/29/2022   12:19:45 AM CDT

## 2022-06-29 NOTE — TELEPHONE ENCOUNTER
Refill Decision Note   Keena Banks  is requesting a refill authorization.  Brief Assessment and Rationale for Refill:  Approve     Medication Therapy Plan:       Medication Reconciliation Completed: No   Comments:     No Care Gaps recommended.     Note composed:9:53 AM 06/29/2022

## 2022-07-08 DIAGNOSIS — E78.5 HYPERLIPIDEMIA, UNSPECIFIED HYPERLIPIDEMIA TYPE: ICD-10-CM

## 2022-07-08 RX ORDER — ROSUVASTATIN CALCIUM 20 MG/1
TABLET, COATED ORAL
Qty: 90 TABLET | Refills: 3 | Status: SHIPPED | OUTPATIENT
Start: 2022-07-08 | End: 2023-05-29 | Stop reason: SDUPTHER

## 2022-07-08 NOTE — TELEPHONE ENCOUNTER
No new care gaps identified.  [facility] Embedded Care Gaps. Reference number: [id number]. 7/08/2022   1:25:59 AM CDT

## 2022-07-08 NOTE — TELEPHONE ENCOUNTER
Refill Decision Note   Keena Banks  is requesting a refill authorization.  Brief Assessment and Rationale for Refill:  Approve     Medication Therapy Plan:       Medication Reconciliation Completed: No   Comments:     No Care Gaps recommended.     Note composed:9:23 AM 07/08/2022

## 2022-07-15 ENCOUNTER — OFFICE VISIT (OUTPATIENT)
Dept: NEUROLOGY | Facility: CLINIC | Age: 67
End: 2022-07-15
Payer: MEDICARE

## 2022-07-15 VITALS
BODY MASS INDEX: 23.28 KG/M2 | SYSTOLIC BLOOD PRESSURE: 127 MMHG | WEIGHT: 131.38 LBS | HEART RATE: 72 BPM | DIASTOLIC BLOOD PRESSURE: 64 MMHG | HEIGHT: 63 IN

## 2022-07-15 DIAGNOSIS — G40.909 SEIZURE DISORDER: Primary | ICD-10-CM

## 2022-07-15 PROCEDURE — 99213 PR OFFICE/OUTPT VISIT, EST, LEVL III, 20-29 MIN: ICD-10-PCS | Mod: S$GLB,,, | Performed by: NURSE PRACTITIONER

## 2022-07-15 PROCEDURE — 3078F PR MOST RECENT DIASTOLIC BLOOD PRESSURE < 80 MM HG: ICD-10-PCS | Mod: CPTII,S$GLB,, | Performed by: NURSE PRACTITIONER

## 2022-07-15 PROCEDURE — 1101F PT FALLS ASSESS-DOCD LE1/YR: CPT | Mod: CPTII,S$GLB,, | Performed by: NURSE PRACTITIONER

## 2022-07-15 PROCEDURE — 1160F RVW MEDS BY RX/DR IN RCRD: CPT | Mod: CPTII,S$GLB,, | Performed by: NURSE PRACTITIONER

## 2022-07-15 PROCEDURE — 3078F DIAST BP <80 MM HG: CPT | Mod: CPTII,S$GLB,, | Performed by: NURSE PRACTITIONER

## 2022-07-15 PROCEDURE — 99213 OFFICE O/P EST LOW 20 MIN: CPT | Mod: S$GLB,,, | Performed by: NURSE PRACTITIONER

## 2022-07-15 PROCEDURE — 3008F BODY MASS INDEX DOCD: CPT | Mod: CPTII,S$GLB,, | Performed by: NURSE PRACTITIONER

## 2022-07-15 PROCEDURE — 3288F FALL RISK ASSESSMENT DOCD: CPT | Mod: CPTII,S$GLB,, | Performed by: NURSE PRACTITIONER

## 2022-07-15 PROCEDURE — 1126F AMNT PAIN NOTED NONE PRSNT: CPT | Mod: CPTII,S$GLB,, | Performed by: NURSE PRACTITIONER

## 2022-07-15 PROCEDURE — 1159F PR MEDICATION LIST DOCUMENTED IN MEDICAL RECORD: ICD-10-PCS | Mod: CPTII,S$GLB,, | Performed by: NURSE PRACTITIONER

## 2022-07-15 PROCEDURE — 1159F MED LIST DOCD IN RCRD: CPT | Mod: CPTII,S$GLB,, | Performed by: NURSE PRACTITIONER

## 2022-07-15 PROCEDURE — 3074F PR MOST RECENT SYSTOLIC BLOOD PRESSURE < 130 MM HG: ICD-10-PCS | Mod: CPTII,S$GLB,, | Performed by: NURSE PRACTITIONER

## 2022-07-15 PROCEDURE — 3288F PR FALLS RISK ASSESSMENT DOCUMENTED: ICD-10-PCS | Mod: CPTII,S$GLB,, | Performed by: NURSE PRACTITIONER

## 2022-07-15 PROCEDURE — 1101F PR PT FALLS ASSESS DOC 0-1 FALLS W/OUT INJ PAST YR: ICD-10-PCS | Mod: CPTII,S$GLB,, | Performed by: NURSE PRACTITIONER

## 2022-07-15 PROCEDURE — 4010F PR ACE/ARB THEARPY RXD/TAKEN: ICD-10-PCS | Mod: CPTII,S$GLB,, | Performed by: NURSE PRACTITIONER

## 2022-07-15 PROCEDURE — 99999 PR PBB SHADOW E&M-EST. PATIENT-LVL III: ICD-10-PCS | Mod: PBBFAC,,, | Performed by: NURSE PRACTITIONER

## 2022-07-15 PROCEDURE — 99999 PR PBB SHADOW E&M-EST. PATIENT-LVL III: CPT | Mod: PBBFAC,,, | Performed by: NURSE PRACTITIONER

## 2022-07-15 PROCEDURE — 4010F ACE/ARB THERAPY RXD/TAKEN: CPT | Mod: CPTII,S$GLB,, | Performed by: NURSE PRACTITIONER

## 2022-07-15 PROCEDURE — 3074F SYST BP LT 130 MM HG: CPT | Mod: CPTII,S$GLB,, | Performed by: NURSE PRACTITIONER

## 2022-07-15 PROCEDURE — 1126F PR PAIN SEVERITY QUANTIFIED, NO PAIN PRESENT: ICD-10-PCS | Mod: CPTII,S$GLB,, | Performed by: NURSE PRACTITIONER

## 2022-07-15 PROCEDURE — 3008F PR BODY MASS INDEX (BMI) DOCUMENTED: ICD-10-PCS | Mod: CPTII,S$GLB,, | Performed by: NURSE PRACTITIONER

## 2022-07-15 PROCEDURE — 1160F PR REVIEW ALL MEDS BY PRESCRIBER/CLIN PHARMACIST DOCUMENTED: ICD-10-PCS | Mod: CPTII,S$GLB,, | Performed by: NURSE PRACTITIONER

## 2022-07-15 RX ORDER — LAMOTRIGINE 300 MG/1
300 TABLET, EXTENDED RELEASE ORAL DAILY
Qty: 90 TABLET | Refills: 3 | Status: SHIPPED | OUTPATIENT
Start: 2022-07-15 | End: 2022-07-21 | Stop reason: SDUPTHER

## 2022-07-15 NOTE — ASSESSMENT & PLAN NOTE
Pt follows Dr. Lira for seizure management.   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    She is maintained on Lamictal  mg daily and doing well.   Keppra has been discontinued.   Last known seizure 3/2022   Obtain updated Lamictal level   State law as it pertains to driving and seizure safety has previously been discussed.
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
## 2022-07-21 DIAGNOSIS — G40.909 SEIZURE DISORDER: ICD-10-CM

## 2022-07-21 RX ORDER — LAMOTRIGINE 300 MG/1
300 TABLET, EXTENDED RELEASE ORAL DAILY
Qty: 90 TABLET | Refills: 3 | Status: SHIPPED | OUTPATIENT
Start: 2022-07-21 | End: 2023-02-06

## 2022-07-21 NOTE — TELEPHONE ENCOUNTER
Spoke with patient regarding medication , patient ok with prescription being sent to the [facility]

## 2022-07-21 NOTE — TELEPHONE ENCOUNTER
----- Message from Zack Pelaez sent at 7/21/2022 10:54 AM CDT -----  Regarding: question  Type: Needs Medical Advice    Who Called:  Keena    Pharm:  [id number] - [place] - [address]  [address]  [place] [address]  Phone: [phone] Fax: [phone]    Best Call Back Number: [phone]     Additional Information: Lamictal Rx recently renewed through [facility]. PT needs hold over Rx until Med Arrived from mail order pharm. Will be 5-7 days before arrives. PT will be out of med today.

## 2022-08-02 DIAGNOSIS — I10 ESSENTIAL HYPERTENSION: ICD-10-CM

## 2022-08-03 RX ORDER — LOSARTAN POTASSIUM 50 MG/1
TABLET ORAL
Qty: 90 TABLET | Refills: 3 | Status: SHIPPED | OUTPATIENT
Start: 2022-08-03 | End: 2023-05-29 | Stop reason: SDUPTHER

## 2022-08-03 NOTE — TELEPHONE ENCOUNTER
No new care gaps identified.  [facility] Embedded Care Gaps. Reference number: [id number]. 8/02/2022   11:15:12 PM CDT

## 2022-08-03 NOTE — TELEPHONE ENCOUNTER
Refill Decision Note   Keena Banks  is requesting a refill authorization.  Brief Assessment and Rationale for Refill:  Approve     Medication Therapy Plan:       Medication Reconciliation Completed: No   Comments:     No Care Gaps recommended.     Note composed:7:37 AM 08/03/2022

## 2022-09-20 ENCOUNTER — LAB VISIT (OUTPATIENT)
Dept: LAB | Facility: HOSPITAL | Age: 67
End: 2022-09-20
Attending: INTERNAL MEDICINE
Payer: MEDICARE

## 2022-09-20 DIAGNOSIS — G40.909 SEIZURE DISORDER: ICD-10-CM

## 2022-09-20 DIAGNOSIS — Z85.72 HISTORY OF LYMPHOMA: ICD-10-CM

## 2022-09-20 LAB
ALBUMIN SERPL BCP-MCNC: 4.2 G/DL (ref 3.5–5.2)
ALP SERPL-CCNC: 77 U/L (ref 55–135)
ALT SERPL W/O P-5'-P-CCNC: 23 U/L (ref 10–44)
ANION GAP SERPL CALC-SCNC: 8 MMOL/L (ref 8–16)
AST SERPL-CCNC: 25 U/L (ref 10–40)
B2 MICROGLOB SERPL-MCNC: 1.2 UG/ML (ref 0–2.5)
BASOPHILS # BLD AUTO: 0.05 K/UL (ref 0–0.2)
BASOPHILS NFR BLD: 1.2 % (ref 0–1.9)
BILIRUB SERPL-MCNC: 0.5 MG/DL (ref 0.1–1)
BUN SERPL-MCNC: 15 MG/DL (ref 8–23)
CALCIUM SERPL-MCNC: 9.8 MG/DL (ref 8.7–10.5)
CHLORIDE SERPL-SCNC: 101 MMOL/L (ref 95–110)
CO2 SERPL-SCNC: 30 MMOL/L (ref 23–29)
CREAT SERPL-MCNC: 0.9 MG/DL (ref 0.5–1.4)
DIFFERENTIAL METHOD: ABNORMAL
EOSINOPHIL # BLD AUTO: 0.1 K/UL (ref 0–0.5)
EOSINOPHIL NFR BLD: 2.3 % (ref 0–8)
ERYTHROCYTE [DISTWIDTH] IN BLOOD BY AUTOMATED COUNT: 12 % (ref 11.5–14.5)
EST. GFR  (NO RACE VARIABLE): >60 ML/MIN/1.73 M^2
GLUCOSE SERPL-MCNC: 81 MG/DL (ref 70–110)
HCT VFR BLD AUTO: 35.4 % (ref 37–48.5)
HGB BLD-MCNC: 11.7 G/DL (ref 12–16)
IMM GRANULOCYTES # BLD AUTO: 0.01 K/UL (ref 0–0.04)
IMM GRANULOCYTES NFR BLD AUTO: 0.2 % (ref 0–0.5)
LDH SERPL L TO P-CCNC: 202 U/L (ref 110–260)
LYMPHOCYTES # BLD AUTO: 1.4 K/UL (ref 1–4.8)
LYMPHOCYTES NFR BLD: 31.9 % (ref 18–48)
MCH RBC QN AUTO: 30.7 PG (ref 27–31)
MCHC RBC AUTO-ENTMCNC: 33.1 G/DL (ref 32–36)
MCV RBC AUTO: 93 FL (ref 82–98)
MONOCYTES # BLD AUTO: 0.3 K/UL (ref 0.3–1)
MONOCYTES NFR BLD: 7.2 % (ref 4–15)
NEUTROPHILS # BLD AUTO: 2.5 K/UL (ref 1.8–7.7)
NEUTROPHILS NFR BLD: 57.2 % (ref 38–73)
NRBC BLD-RTO: 0 /100 WBC
PLATELET # BLD AUTO: 228 K/UL (ref 150–450)
PMV BLD AUTO: 9.4 FL (ref 9.2–12.9)
POTASSIUM SERPL-SCNC: 4.2 MMOL/L (ref 3.5–5.1)
PROT SERPL-MCNC: 6.8 G/DL (ref 6–8.4)
RBC # BLD AUTO: 3.81 M/UL (ref 4–5.4)
SODIUM SERPL-SCNC: 139 MMOL/L (ref 136–145)
WBC # BLD AUTO: 4.3 K/UL (ref 3.9–12.7)

## 2022-09-20 PROCEDURE — 80175 DRUG SCREEN QUAN LAMOTRIGINE: CPT | Performed by: NURSE PRACTITIONER

## 2022-09-20 PROCEDURE — 82232 ASSAY OF BETA-2 PROTEIN: CPT | Performed by: NURSE PRACTITIONER

## 2022-09-20 PROCEDURE — 80053 COMPREHEN METABOLIC PANEL: CPT | Mod: PN | Performed by: NURSE PRACTITIONER

## 2022-09-20 PROCEDURE — 83615 LACTATE (LD) (LDH) ENZYME: CPT | Mod: PN | Performed by: NURSE PRACTITIONER

## 2022-09-20 PROCEDURE — 85025 COMPLETE CBC W/AUTO DIFF WBC: CPT | Mod: PN | Performed by: NURSE PRACTITIONER

## 2022-09-20 PROCEDURE — 36415 COLL VENOUS BLD VENIPUNCTURE: CPT | Mod: PN | Performed by: NURSE PRACTITIONER

## 2022-09-23 LAB — LAMOTRIGINE SERPL-MCNC: 6.7 UG/ML (ref 2–15)

## 2022-09-27 ENCOUNTER — OFFICE VISIT (OUTPATIENT)
Dept: NEUROLOGY | Facility: CLINIC | Age: 67
End: 2022-09-27
Payer: MEDICARE

## 2022-09-27 ENCOUNTER — OFFICE VISIT (OUTPATIENT)
Dept: HEMATOLOGY/ONCOLOGY | Facility: CLINIC | Age: 67
End: 2022-09-27
Payer: MEDICARE

## 2022-09-27 VITALS
WEIGHT: 134.06 LBS | BODY MASS INDEX: 23.75 KG/M2 | HEART RATE: 63 BPM | TEMPERATURE: 98 F | DIASTOLIC BLOOD PRESSURE: 71 MMHG | SYSTOLIC BLOOD PRESSURE: 124 MMHG | HEIGHT: 63 IN | OXYGEN SATURATION: 98 %

## 2022-09-27 VITALS
SYSTOLIC BLOOD PRESSURE: 133 MMHG | HEART RATE: 65 BPM | WEIGHT: 134.13 LBS | HEIGHT: 63 IN | RESPIRATION RATE: 16 BRPM | BODY MASS INDEX: 23.77 KG/M2 | DIASTOLIC BLOOD PRESSURE: 74 MMHG

## 2022-09-27 DIAGNOSIS — G40.909 SEIZURE DISORDER: ICD-10-CM

## 2022-09-27 DIAGNOSIS — Z85.72 HISTORY OF LYMPHOMA: Primary | ICD-10-CM

## 2022-09-27 PROCEDURE — 3078F PR MOST RECENT DIASTOLIC BLOOD PRESSURE < 80 MM HG: ICD-10-PCS | Mod: CPTII,S$GLB,, | Performed by: NURSE PRACTITIONER

## 2022-09-27 PROCEDURE — 1160F RVW MEDS BY RX/DR IN RCRD: CPT | Mod: CPTII,S$GLB,, | Performed by: NURSE PRACTITIONER

## 2022-09-27 PROCEDURE — 3008F PR BODY MASS INDEX (BMI) DOCUMENTED: ICD-10-PCS | Mod: CPTII,S$GLB,, | Performed by: NURSE PRACTITIONER

## 2022-09-27 PROCEDURE — 1101F PT FALLS ASSESS-DOCD LE1/YR: CPT | Mod: CPTII,S$GLB,, | Performed by: NURSE PRACTITIONER

## 2022-09-27 PROCEDURE — 3074F SYST BP LT 130 MM HG: CPT | Mod: CPTII,S$GLB,, | Performed by: NURSE PRACTITIONER

## 2022-09-27 PROCEDURE — 1126F PR PAIN SEVERITY QUANTIFIED, NO PAIN PRESENT: ICD-10-PCS | Mod: CPTII,S$GLB,, | Performed by: NURSE PRACTITIONER

## 2022-09-27 PROCEDURE — 1159F PR MEDICATION LIST DOCUMENTED IN MEDICAL RECORD: ICD-10-PCS | Mod: CPTII,S$GLB,, | Performed by: NURSE PRACTITIONER

## 2022-09-27 PROCEDURE — 3288F PR FALLS RISK ASSESSMENT DOCUMENTED: ICD-10-PCS | Mod: CPTII,S$GLB,, | Performed by: NURSE PRACTITIONER

## 2022-09-27 PROCEDURE — 4010F PR ACE/ARB THEARPY RXD/TAKEN: ICD-10-PCS | Mod: CPTII,S$GLB,, | Performed by: NURSE PRACTITIONER

## 2022-09-27 PROCEDURE — 99999 PR PBB SHADOW E&M-EST. PATIENT-LVL III: ICD-10-PCS | Mod: PBBFAC,,, | Performed by: NURSE PRACTITIONER

## 2022-09-27 PROCEDURE — 1160F PR REVIEW ALL MEDS BY PRESCRIBER/CLIN PHARMACIST DOCUMENTED: ICD-10-PCS | Mod: CPTII,S$GLB,, | Performed by: NURSE PRACTITIONER

## 2022-09-27 PROCEDURE — 3288F FALL RISK ASSESSMENT DOCD: CPT | Mod: CPTII,S$GLB,, | Performed by: NURSE PRACTITIONER

## 2022-09-27 PROCEDURE — 3078F DIAST BP <80 MM HG: CPT | Mod: CPTII,S$GLB,, | Performed by: NURSE PRACTITIONER

## 2022-09-27 PROCEDURE — 4010F ACE/ARB THERAPY RXD/TAKEN: CPT | Mod: CPTII,S$GLB,, | Performed by: NURSE PRACTITIONER

## 2022-09-27 PROCEDURE — 99213 OFFICE O/P EST LOW 20 MIN: CPT | Mod: S$GLB,,, | Performed by: NURSE PRACTITIONER

## 2022-09-27 PROCEDURE — 99212 PR OFFICE/OUTPT VISIT, EST, LEVL II, 10-19 MIN: ICD-10-PCS | Mod: S$GLB,,, | Performed by: NURSE PRACTITIONER

## 2022-09-27 PROCEDURE — 99999 PR PBB SHADOW E&M-EST. PATIENT-LVL III: CPT | Mod: PBBFAC,,, | Performed by: NURSE PRACTITIONER

## 2022-09-27 PROCEDURE — 99212 OFFICE O/P EST SF 10 MIN: CPT | Mod: S$GLB,,, | Performed by: NURSE PRACTITIONER

## 2022-09-27 PROCEDURE — 3008F BODY MASS INDEX DOCD: CPT | Mod: CPTII,S$GLB,, | Performed by: NURSE PRACTITIONER

## 2022-09-27 PROCEDURE — 1159F MED LIST DOCD IN RCRD: CPT | Mod: CPTII,S$GLB,, | Performed by: NURSE PRACTITIONER

## 2022-09-27 PROCEDURE — 3074F PR MOST RECENT SYSTOLIC BLOOD PRESSURE < 130 MM HG: ICD-10-PCS | Mod: CPTII,S$GLB,, | Performed by: NURSE PRACTITIONER

## 2022-09-27 PROCEDURE — 1101F PR PT FALLS ASSESS DOC 0-1 FALLS W/OUT INJ PAST YR: ICD-10-PCS | Mod: CPTII,S$GLB,, | Performed by: NURSE PRACTITIONER

## 2022-09-27 PROCEDURE — 3075F SYST BP GE 130 - 139MM HG: CPT | Mod: CPTII,S$GLB,, | Performed by: NURSE PRACTITIONER

## 2022-09-27 PROCEDURE — 3075F PR MOST RECENT SYSTOLIC BLOOD PRESS GE 130-139MM HG: ICD-10-PCS | Mod: CPTII,S$GLB,, | Performed by: NURSE PRACTITIONER

## 2022-09-27 PROCEDURE — 99213 PR OFFICE/OUTPT VISIT, EST, LEVL III, 20-29 MIN: ICD-10-PCS | Mod: S$GLB,,, | Performed by: NURSE PRACTITIONER

## 2022-09-27 PROCEDURE — 1126F AMNT PAIN NOTED NONE PRSNT: CPT | Mod: CPTII,S$GLB,, | Performed by: NURSE PRACTITIONER
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
## 2022-09-27 NOTE — ASSESSMENT & PLAN NOTE
Patient is a 66 y/o female that presents for f/u   -  follows Dr. Lira for seizure management.   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    She is maintained on Lamictal  mg daily and doing well.   Previously taking Keppra which gave her side effects. This has been discontinued  Last known seizure 2/2022  Recent Lamictal level therapeutic   State law as it pertains to driving and seizure safety has previously been discussed.

## 2022-09-27 NOTE — PROGRESS NOTES
CC:  Annual lymphoma surveillance    HISTORY OF PRESENT ILLNESS:    This is a 67-year-old  female known to Dr. Campos for Stage IIIA diffuse large B-cell non-Hodgkin lymphoma that presented with adenopathy in the left supraclavicular region.  She was treated with 8 cycles of CHOP/Rituxan and went into complete remission.      She presents to the clinic today for her 120-month (10 yr) post-therapy evaluation.    She remains active and well.    She reports a seizure when changing medication from Lamictal to Keppra in February/22.  She has resumed Lamictal without incident.  She reports a TIA in May/2022.  CT & MRI done; on ASA EC 81 mg daily.  Found to have a meningioma in the brain that has been stable.  She denies any recurrent illness, fevers, chills, drenching night sweats, painful lymphadenopathy, unexplained weight loss, rashes, pruritus, abdominal discomfort/bloating, nausea, vomiting, constipation, diarrhea, irregular heartbeat, chest pain, bleeding, etc. She follows with dermatology annually and as needed. No other new complaints or pertinent findings on 14-point review of systems.    Past Medical History:   Diagnosis Date    Depression     anxiety and depression situational    Hypertension     Menopausal symptom     vaginal dryness and hot flashes    NHL (non-Hodgkin's lymphoma) 2013    s/p 8 cycles of CHOP/Rituxan    Osteoarthritis     both hands    Seasonal allergies     seasonal and animal    Seizures 2013    Started 11/2014 after treatment for NHL, MRI brain normal, Grand mal 10/2014 and 6/2014 started several months after chemo    Skin cancer 01/2020    TIA (transient ischemic attack) 05/09/2022    [facility]     Past Surgical History:   Procedure Laterality Date    BONE MARROW ASPIRATION      CARPAL TUNNEL RELEASE Right     CARPAL TUNNEL RELEASE      COLONOSCOPY  09/15/2006    CATHLEEN.   One 2-3 mm polyp in the hepatic flexure.  HYPERPLASTIC POLYP.  Small internal hemorrhoids.     COLONOSCOPY N/A 8/10/2016    Procedure: COLONOSCOPY;  Surgeon: Patel Foster Jr., MD;  Location: [facility];  Service: Endoscopy;  Laterality: N/A;    HYSTERECTOMY      total, including ovaries secondary to endometriosis, fibroids    LYMPH NODE BIOPSY  01/24/12    Left cervical lymph node    OOPHORECTOMY      PORTACATH PLACEMENT      portacath removal  2015    SALIVARY GLAND SURGERY Right     removed for large stone    TONSILLECTOMY      VAGINAL DELIVERY      times 2     Current Outpatient Medications on File Prior to Visit   Medication Sig Dispense Refill    aspirin (ECOTRIN) 81 MG EC tablet Take 81 mg by mouth. Every other day      celecoxib (CELEBREX) 200 MG capsule Take by mouth once daily.      fluticasone (FLONASE) 50 mcg/actuation nasal spray 2 sprays by Each Nare route once daily. 1 Bottle 6    gabapentin (NEURONTIN) 100 MG capsule Take by mouth once daily.      hydroCHLOROthiazide (HYDRODIURIL) 25 MG tablet Take 1 tablet (25 mg total) by mouth once daily. 90 tablet 3    ibuprofen (ADVIL,MOTRIN) 200 MG tablet Take 400 mg by mouth every 6 (six) hours as needed for Pain.      lamotrigine XR (LAMICTAL XR) 300 mg XR tablet Take 1 tablet (300 mg total) by mouth once daily. 90 tablet 3    losartan (COZAAR) 50 MG tablet TAKE 1 TABLET DAILY 90 tablet 3    multivitamin with minerals tablet Take 1 tablet by mouth once daily.      rosuvastatin (CRESTOR) 20 MG tablet TAKE 1 TABLET BY MOUTH EVERY DAY 90 tablet 3    venlafaxine (EFFEXOR-XR) 150 MG Cp24 TAKE 1 CAPSULE (150 MG TOTAL) BY MOUTH ONCE DAILY. 90 capsule 3     No current facility-administered medications on file prior to visit.       PHYSICAL EXAMINATION:  GENERAL:  Well-developed, well-nourished white female in no acute distress.  Alert and oriented x3.  VITAL SIGNS:  Weight:  Gain of 2 1/2 pounds in 1 year   Wt Readings from Last 3 Encounters:   09/27/22 60.8 kg (134 lb 0.6 oz)   09/27/22 60.9 kg (134 lb 2.4 oz)   07/15/22 59.6 kg (131 lb 6.3 oz)     Temp  Readings from Last 3 Encounters:   09/27/22 98.3 °F (36.8 °C)   06/10/22 98.4 °F (36.9 °C) (Temporal)   03/31/22 98.3 °F (36.8 °C)     BP Readings from Last 3 Encounters:   09/27/22 124/71   09/27/22 133/74   07/15/22 127/64     Pulse Readings from Last 3 Encounters:   09/27/22 63   09/27/22 65   07/15/22 72     HEENT:  Normocephalic, atraumatic.  Oral mucosa pink and moist.  Lips without lesions.  Tongue midline.  Oropharynx clear.  Nonicteric sclerae.  NECK:  Supple, no adenopathy.  No carotid bruits, thyromegaly or thyroid nodule.  HEART:  Regular rate and rhythm without murmur, gallop or rub.  LUNGS:  Clear to auscultation bilaterally.  Normal respiratory effort.  ABDOMEN:  Soft, nontender, nondistended with positive normoactive bowel sounds, no hepatosplenomegaly.  EXTREMITIES:  No cyanosis, clubbing or edema.  Distal pulses are intact.  AXILLAE AND GROIN:  No palpable pathologic lymphadenopathy is appreciated.  SKIN:  Intact/turgor normal.  NEUROLOGIC:  Cranial nerves II-XII grossly intact.  Motor:  Good muscle bulk and tone.  Strength/sensory 5/5 throughout.  Gait stable.    LABORATORY:    Lab Results   Component Value Date    WBC 4.30 09/20/2022    RBC 3.81 (L) 09/20/2022    HGB 11.7 (L) 09/20/2022    HCT 35.4 (L) 09/20/2022    MCV 93 09/20/2022    MCH 30.7 09/20/2022    MCHC 33.1 09/20/2022    RDW 12.0 09/20/2022     09/20/2022    MPV 9.4 09/20/2022    GRAN 2.5 09/20/2022    GRAN 57.2 09/20/2022    LYMPH 1.4 09/20/2022    LYMPH 31.9 09/20/2022    MONO 0.3 09/20/2022    MONO 7.2 09/20/2022    EOS 0.1 09/20/2022    BASO 0.05 09/20/2022    EOSINOPHIL 2.3 09/20/2022    BASOPHIL 1.2 09/20/2022     CMP  Sodium   Date Value Ref Range Status   09/20/2022 139 136 - 145 mmol/L Final     Potassium   Date Value Ref Range Status   09/20/2022 4.2 3.5 - 5.1 mmol/L Final     Chloride   Date Value Ref Range Status   09/20/2022 101 95 - 110 mmol/L Final     CO2   Date Value Ref Range Status   09/20/2022 30 (H) 23 - 29  mmol/L Final     Glucose   Date Value Ref Range Status   09/20/2022 81 70 - 110 mg/dL Final     BUN   Date Value Ref Range Status   09/20/2022 15 8 - 23 mg/dL Final     Creatinine   Date Value Ref Range Status   09/20/2022 0.9 0.5 - 1.4 mg/dL Final     Calcium   Date Value Ref Range Status   09/20/2022 9.8 8.7 - 10.5 mg/dL Final     Total Protein   Date Value Ref Range Status   09/20/2022 6.8 6.0 - 8.4 g/dL Final     Albumin   Date Value Ref Range Status   09/20/2022 4.2 3.5 - 5.2 g/dL Final     Total Bilirubin   Date Value Ref Range Status   09/20/2022 0.5 0.1 - 1.0 mg/dL Final     Comment:     For infants and newborns, interpretation of results should be based  on gestational age, weight and in agreement with clinical  observations.    Premature Infant recommended reference ranges:  Up to 24 hours.............<8.0 mg/dL  Up to 48 hours............<12.0 mg/dL  3-5 days..................<15.0 mg/dL  6-29 days.................<15.0 mg/dL       Alkaline Phosphatase   Date Value Ref Range Status   09/20/2022 77 55 - 135 U/L Final     AST   Date Value Ref Range Status   09/20/2022 25 10 - 40 U/L Final     ALT   Date Value Ref Range Status   09/20/2022 23 10 - 44 U/L Final     Anion Gap   Date Value Ref Range Status   09/20/2022 8 8 - 16 mmol/L Final     eGFR if    Date Value Ref Range Status   06/06/2022 >60.0 >60 mL/min/1.73 m^2 Final     eGFR if non    Date Value Ref Range Status   06/06/2022 >60.0 >60 mL/min/1.73 m^2 Final     Comment:     Calculation used to obtain the estimated glomerular filtration  rate (eGFR) is the CKD-EPI equation.           [id number] 1.2    IMPRESSION:  1.  Stage IIIA diffuse large B-cell non-Hodgkin lymphoma - MUNIR @ 120 month post.  2.  Idiopathic seizure disorder - stable.  3.  Elevated liver function test - fatty liver, remains stable.  4.  Skin cancer - follows Dr. Mena   5.  Anemia - mild, asymptomatic  6.  TIA - followed in  Neurology    PLAN: Return in one year with interval CBC, CMP, LDH, and beta-2 microglobulin for annual surveillance (11 year post).    Assessment/plan reviewed and approved by Dr. Sharp.    Route Chart for Scheduling    Med Onc Chart Routing      Follow up with physician    Follow up with BULL 1 year. f/u in 1 year with CBC, CMP, LDH, BETA2 prior   Infusion scheduling note    Injection scheduling note    Labs    Imaging    Pharmacy appointment No pharmacy appointment needed      Other referrals No additional referrals needed

## 2022-11-29 ENCOUNTER — PATIENT OUTREACH (OUTPATIENT)
Dept: ADMINISTRATIVE | Facility: HOSPITAL | Age: 67
End: 2022-11-29
Payer: MEDICARE

## 2022-11-29 NOTE — PROGRESS NOTES
2022 Care Everywhere updates requested and reviewed.  Immunizations reconciled. Media reports reviewed.  Duplicate HM overrides and  orders removed.  Overdue HM topic chart audit and/or requested.  Overdue lab testing linked to upcoming lab appointments if applies.    Health Maintenance Due   Topic Date Due    COVID-19 Vaccine (4 - Booster for Pfizer series) 2022    Influenza Vaccine (1) 2022

## 2022-12-12 ENCOUNTER — OFFICE VISIT (OUTPATIENT)
Dept: FAMILY MEDICINE | Facility: CLINIC | Age: 67
End: 2022-12-12
Payer: MEDICARE

## 2022-12-12 VITALS
HEIGHT: 63 IN | DIASTOLIC BLOOD PRESSURE: 70 MMHG | TEMPERATURE: 98 F | BODY MASS INDEX: 24.84 KG/M2 | WEIGHT: 140.19 LBS | OXYGEN SATURATION: 96 % | RESPIRATION RATE: 16 BRPM | HEART RATE: 69 BPM | SYSTOLIC BLOOD PRESSURE: 122 MMHG

## 2022-12-12 DIAGNOSIS — E78.5 HYPERLIPIDEMIA, UNSPECIFIED HYPERLIPIDEMIA TYPE: ICD-10-CM

## 2022-12-12 DIAGNOSIS — M75.111 NONTRAUMATIC INCOMPLETE TEAR OF RIGHT ROTATOR CUFF: ICD-10-CM

## 2022-12-12 DIAGNOSIS — I25.10 CORONARY ARTERY DISEASE INVOLVING NATIVE CORONARY ARTERY OF NATIVE HEART WITHOUT ANGINA PECTORIS: Primary | ICD-10-CM

## 2022-12-12 DIAGNOSIS — D32.9 MENINGIOMA: ICD-10-CM

## 2022-12-12 DIAGNOSIS — K76.0 NAFLD (NONALCOHOLIC FATTY LIVER DISEASE): ICD-10-CM

## 2022-12-12 DIAGNOSIS — Z85.72 HISTORY OF LYMPHOMA: ICD-10-CM

## 2022-12-12 DIAGNOSIS — Z86.73 HISTORY OF TIA (TRANSIENT ISCHEMIC ATTACK): ICD-10-CM

## 2022-12-12 DIAGNOSIS — F33.0 MAJOR DEPRESSIVE DISORDER, RECURRENT, MILD: ICD-10-CM

## 2022-12-12 DIAGNOSIS — D64.9 ANEMIA, UNSPECIFIED TYPE: ICD-10-CM

## 2022-12-12 DIAGNOSIS — Z23 NEED FOR IMMUNIZATION AGAINST INFLUENZA: ICD-10-CM

## 2022-12-12 DIAGNOSIS — G40.909 SEIZURE DISORDER: ICD-10-CM

## 2022-12-12 DIAGNOSIS — I10 PRIMARY HYPERTENSION: ICD-10-CM

## 2022-12-12 DIAGNOSIS — M19.049 HAND ARTHRITIS: ICD-10-CM

## 2022-12-12 DIAGNOSIS — F41.1 GAD (GENERALIZED ANXIETY DISORDER): ICD-10-CM

## 2022-12-12 PROCEDURE — 99214 PR OFFICE/OUTPT VISIT, EST, LEVL IV, 30-39 MIN: ICD-10-PCS | Mod: S$GLB,,, | Performed by: INTERNAL MEDICINE

## 2022-12-12 PROCEDURE — 1101F PT FALLS ASSESS-DOCD LE1/YR: CPT | Mod: CPTII,S$GLB,, | Performed by: INTERNAL MEDICINE

## 2022-12-12 PROCEDURE — 3008F PR BODY MASS INDEX (BMI) DOCUMENTED: ICD-10-PCS | Mod: CPTII,S$GLB,, | Performed by: INTERNAL MEDICINE

## 2022-12-12 PROCEDURE — 1160F PR REVIEW ALL MEDS BY PRESCRIBER/CLIN PHARMACIST DOCUMENTED: ICD-10-PCS | Mod: CPTII,S$GLB,, | Performed by: INTERNAL MEDICINE

## 2022-12-12 PROCEDURE — 3288F PR FALLS RISK ASSESSMENT DOCUMENTED: ICD-10-PCS | Mod: CPTII,S$GLB,, | Performed by: INTERNAL MEDICINE

## 2022-12-12 PROCEDURE — 3074F PR MOST RECENT SYSTOLIC BLOOD PRESSURE < 130 MM HG: ICD-10-PCS | Mod: CPTII,S$GLB,, | Performed by: INTERNAL MEDICINE

## 2022-12-12 PROCEDURE — 1159F PR MEDICATION LIST DOCUMENTED IN MEDICAL RECORD: ICD-10-PCS | Mod: CPTII,S$GLB,, | Performed by: INTERNAL MEDICINE

## 2022-12-12 PROCEDURE — 90694 FLU VACCINE - QUADRIVALENT - ADJUVANTED: ICD-10-PCS | Mod: S$GLB,,, | Performed by: INTERNAL MEDICINE

## 2022-12-12 PROCEDURE — 3078F DIAST BP <80 MM HG: CPT | Mod: CPTII,S$GLB,, | Performed by: INTERNAL MEDICINE

## 2022-12-12 PROCEDURE — 3288F FALL RISK ASSESSMENT DOCD: CPT | Mod: CPTII,S$GLB,, | Performed by: INTERNAL MEDICINE

## 2022-12-12 PROCEDURE — 90694 VACC AIIV4 NO PRSRV 0.5ML IM: CPT | Mod: S$GLB,,, | Performed by: INTERNAL MEDICINE

## 2022-12-12 PROCEDURE — G0008 ADMIN INFLUENZA VIRUS VAC: HCPCS | Mod: S$GLB,,, | Performed by: INTERNAL MEDICINE

## 2022-12-12 PROCEDURE — 1159F MED LIST DOCD IN RCRD: CPT | Mod: CPTII,S$GLB,, | Performed by: INTERNAL MEDICINE

## 2022-12-12 PROCEDURE — 1126F PR PAIN SEVERITY QUANTIFIED, NO PAIN PRESENT: ICD-10-PCS | Mod: CPTII,S$GLB,, | Performed by: INTERNAL MEDICINE

## 2022-12-12 PROCEDURE — 3074F SYST BP LT 130 MM HG: CPT | Mod: CPTII,S$GLB,, | Performed by: INTERNAL MEDICINE

## 2022-12-12 PROCEDURE — 3008F BODY MASS INDEX DOCD: CPT | Mod: CPTII,S$GLB,, | Performed by: INTERNAL MEDICINE

## 2022-12-12 PROCEDURE — 4010F PR ACE/ARB THEARPY RXD/TAKEN: ICD-10-PCS | Mod: CPTII,S$GLB,, | Performed by: INTERNAL MEDICINE

## 2022-12-12 PROCEDURE — 1160F RVW MEDS BY RX/DR IN RCRD: CPT | Mod: CPTII,S$GLB,, | Performed by: INTERNAL MEDICINE

## 2022-12-12 PROCEDURE — 99214 OFFICE O/P EST MOD 30 MIN: CPT | Mod: S$GLB,,, | Performed by: INTERNAL MEDICINE

## 2022-12-12 PROCEDURE — G0008 FLU VACCINE - QUADRIVALENT - ADJUVANTED: ICD-10-PCS | Mod: S$GLB,,, | Performed by: INTERNAL MEDICINE

## 2022-12-12 PROCEDURE — 1126F AMNT PAIN NOTED NONE PRSNT: CPT | Mod: CPTII,S$GLB,, | Performed by: INTERNAL MEDICINE

## 2022-12-12 PROCEDURE — 4010F ACE/ARB THERAPY RXD/TAKEN: CPT | Mod: CPTII,S$GLB,, | Performed by: INTERNAL MEDICINE

## 2022-12-12 PROCEDURE — 3078F PR MOST RECENT DIASTOLIC BLOOD PRESSURE < 80 MM HG: ICD-10-PCS | Mod: CPTII,S$GLB,, | Performed by: INTERNAL MEDICINE

## 2022-12-12 PROCEDURE — 1101F PR PT FALLS ASSESS DOC 0-1 FALLS W/OUT INJ PAST YR: ICD-10-PCS | Mod: CPTII,S$GLB,, | Performed by: INTERNAL MEDICINE

## 2022-12-12 RX ORDER — ASPIRIN 81 MG/1
81 TABLET ORAL DAILY
COMMUNITY

## 2022-12-12 NOTE — PROGRESS NOTES
Subjective:       Patient ID: Keena Banks is a 67 y.o. female.    Medication List with Changes/Refills   Current Medications    ASPIRIN (ECOTRIN) 81 MG EC TABLET    Take 81 mg by mouth once daily.    CELECOXIB (CELEBREX) 200 MG CAPSULE    Take by mouth once daily.    FLUTICASONE (FLONASE) 50 MCG/ACTUATION NASAL SPRAY    2 sprays by Each Nare route once daily.    HYDROCHLOROTHIAZIDE (HYDRODIURIL) 25 MG TABLET    Take 1 tablet (25 mg total) by mouth once daily.    LAMOTRIGINE XR (LAMICTAL XR) 300 MG XR TABLET    Take 1 tablet (300 mg total) by mouth once daily.    LOSARTAN (COZAAR) 50 MG TABLET    TAKE 1 TABLET DAILY    MULTIVITAMIN WITH MINERALS TABLET    Take 1 tablet by mouth once daily.    ROSUVASTATIN (CRESTOR) 20 MG TABLET    TAKE 1 TABLET BY MOUTH EVERY DAY    VENLAFAXINE (EFFEXOR-XR) 150 MG CP24    TAKE 1 CAPSULE (150 MG TOTAL) BY MOUTH ONCE DAILY.   Discontinued Medications    ASPIRIN (ECOTRIN) 81 MG EC TABLET    Take 81 mg by mouth. Every other day    GABAPENTIN (NEURONTIN) 100 MG CAPSULE    Take by mouth once daily.    IBUPROFEN (ADVIL,MOTRIN) 200 MG TABLET    Take 400 mg by mouth every 6 (six) hours as needed for Pain.       Chief Complaint: Follow-up  She is here today to f/u on her chronic medical issues.      She was admitted to [facility] in 2/2022 for grand mal seizure after switching her seizure medication from lamictal to keppra. She made this switch for concern that lamictal was causing her to be angry.  During her stay there was concern about her heart. S.      She was admitted to [facility] on 5/9/2022 (we have records) for aphasia and elevated BP that lasted a day then resolved. MRI showed 0.8 x 0.4 cm extra-axial focus of enhancement along the inner table of the left parietal calvarium without associated mass effect and no acute intracranial pathology.  CTA of the head and  neck showed no hemodynamically significant stenosis or aneurysms.  CT head was unremarkable. She was d/c  home on plavix for 21 days.       She has hypertension and is taking HCTZ 25 mg qday and losartan 50 mg qday. She denies chest pain or shortness of breath. She had a workup with cardiology with an echo (EF 45% per patient) and cardiac cath that per patient showed non obstructive disease with 30% blockage in one vessel.  Advised to continue statin and change diet     She has hyperlipidemia controlled on crestor 20 mg qday. Her lipids on 6/2022 were 166/38/81/77. She is taking aspirin daily.      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 She is taking lamictal  mg qday. She is doing very well. She has not had any further seizures since restarting her lamictal on 2/2022.  She does say the lamictal affects her memory and she is more irritable but no other side effects. She had neuropsych testing on 7/2018 which showed no memory impairment but mild depression. She was seen by neurology on 5/2022.       She has a meningioma seen on MRI on 5/2022. She denies any headaches, nausea or vision changes. She was seen by neurosurgery on 6/2022 and advised to repeat imaging in one year and then f/u.      She had an ultrasound showing fatty infiltration of her liver. She has normal LFTs.       She had depression with anxiety and is taking venlafaxine 150 mg daily. She feels this dose controls her symptoms. She is sleeping well. She denies any anxiety symptoms.No suicidal ideations.      She has a history of NHL(stage III A diffuse large B cell lymphoma) s/p 8 cycles of chemotherapy with CHOP/rituxan. She was  seen by oncology annual and her last appt was on 9/2022.  She continues to be in remission and will f/u in one year.     She has continues pain and arthritis in her hands. She was seen by rheumatology on 7/2019 and dx with erosive ostearthritis.   She reports increasing pain especially in the middle finger of her  right hand. Her DIP joints remain swollen and painful. Xray of her hands on 11/2020 showed advanced degenerative change in the interphalangeal joints of both hands, mostly involving the DIP joints but also the PIP joints at several sites.  This has progressed involving the left 2nd DIP joint and right 3rd DIP joint.  She takes celebrex as needed with CBD oil.      She complains of pain in her left shoulder and was diagnosed with a rotator cuff tear by orthopedics on 5/2022. She received a steroid injection and completed  PT. She feels this improved her pain.     She lives with her  and granddaughter (plays golf). Her  recently diagnosed with invasive bladder cancer.  She exercises regularly.  She tries to eat healthy.      Colonoscopy---8/2016 repeat in 8 years  Mammogram----6/2022 neg  DEXA-----11/2021 normal   Pap-----once since ANDREW neg  Tdap---5/2019  Influenza vaccine----12/2021---due   Pneumovax 23----11/2015  Prevnar 13----12/2016  Shingrex--- 6/2018 , 9/2020   Covid vaccine---3 doses      Review of Systems   Constitutional:  Negative for appetite change, fatigue, fever and unexpected weight change.   HENT:  Negative for congestion, ear pain, hearing loss, sore throat and trouble swallowing.    Eyes:  Negative for pain and visual disturbance.   Respiratory:  Negative for cough, chest tightness, shortness of breath and wheezing.    Cardiovascular:  Negative for chest pain, palpitations and leg swelling.   Gastrointestinal:  Negative for abdominal pain, blood in stool, constipation, diarrhea, nausea and vomiting.   Endocrine: Negative for polyuria.   Genitourinary:  Negative for dysuria and hematuria.   Musculoskeletal:  Positive for arthralgias. Negative for back pain and myalgias.   Skin:  Negative for rash.   Neurological:  Negative for dizziness, weakness, numbness and headaches.   Hematological:  Does not bruise/bleed easily.   Psychiatric/Behavioral:  Negative for dysphoric mood, sleep  "disturbance and suicidal ideas. The patient is not nervous/anxious.      Objective:      Vitals:    12/12/22 1255   BP: 122/70   Pulse: 69   Resp: 16   Temp: 98.1 °F (36.7 °C)   TempSrc: Temporal   SpO2: 96%   Weight: 63.6 kg (140 lb 3.4 oz)   Height: 5' 3" (1.6 m)     Body mass index is 24.84 kg/m².  Physical Exam    General appearance: No acute distress, cooperative  Eyes: PERRL, EOMI, conjunctiva clear  Ears: normal external ear and pinna, tm clear without drainage, canals clear  Nose: Normal mucosa without drainage  Throat: no exudates or erythema, tonsils not enlarged  Mouth: no sores or lesions, moist mucous membranes  Neck: FROM, soft, supple, no thyromegaly, no bruits  Lymph: no anterior or posterior cervical adenopathy  Heart::  Regular rate and rhythm, no murmur  Lung: Clear to ascultation bilaterally, no wheezing, no rales, no rhonchi, no distress  Abdomen: Soft, nontender, no distention, no hepatosplenomegaly, bowel sounds normal, no guarding, no rebound, no peritoneal signs  Skin: no rashes, no lesions  Extremities: no edema, no cyanosis  Neuro: CN 2-12 intact, 5/5 muscle strength upper and lower extremity bilaterally, 2+ DTRs UE and LE bilaterally, normal gait  Peripheral pulses: 2+ pedal pulses bilaterally, good perfusion and color  Musculoskeletal: FROM, good strenth, no tenderness  Joint: normal appearance, no swelling, no warmth, deformity with nodules on DIP joints on fingers.     Assessment:       1. Coronary artery disease involving native coronary artery of native heart without angina pectoris    2. Primary hypertension    3. Hyperlipidemia, unspecified hyperlipidemia type    4. History of TIA (transient ischemic attack)    5. NAFLD (nonalcoholic fatty liver disease)    6. Anemia, unspecified type    7. Seizure disorder    8. History of lymphoma    9. Meningioma    10. Major depressive disorder, recurrent, mild    11. SORAYA (generalized anxiety disorder)    12. Hand arthritis    13. Nontraumatic " incomplete tear of right rotator cuff    14. Need for immunization against influenza        Plan:       Coronary artery disease involving native coronary artery of native heart without angina pectoris  Mild and non-obstructive.  Continue to follow with cardiology    Primary hypertension  Well controlled and continue current regimen.   -     CBC Auto Differential; Future; Expected date: 12/12/2022  -     Comprehensive Metabolic Panel; Future; Expected date: 12/12/2022  -     Lipid Panel; Future; Expected date: 12/12/2022  -     TSH; Future; Expected date: 12/12/2022    Hyperlipidemia, unspecified hyperlipidemia type  Good control on crestor. Continue aspirin daily.    History of TIA (transient ischemic attack)  Continue statin and aspirin.    NAFLD (nonalcoholic fatty liver disease)  STable and continue to monitor.    Anemia, unspecified type  Noted for about a year and intermittently prior. She is followed by hematology. Will recheck with next labs.   -     Iron and TIBC; Future; Expected date: 12/12/2022  -     Ferritin; Future; Expected date: 12/12/2022    Seizure disorder  Good control on lamictal.    History of lymphoma  No active disease.     Meningioma  Small and due to recheck MRI in 5/2023 for stability. Will follow with NS after MRI complete.   -     MRI Brain W WO Contrast; Future; Expected date: 12/12/2022    Major depressive disorder, recurrent, mild  Well controlled and continue current regimen.     SORAYA (generalized anxiety disorder)  Doing well on effexor.     Hand arthritis  Stable and continue celebrex. Okay to use tyelnol in addition if needed.    Nontraumatic incomplete tear of right rotator cuff  Improved after steroid injection and PT    Need for immunization against influenza  -     Influenza - Quadrivalent (Adjuvanted)    Follow up in about 6 months (around 6/12/2023) for chronic medical issues.

## 2023-01-03 ENCOUNTER — TELEPHONE (OUTPATIENT)
Dept: NEUROLOGY | Facility: CLINIC | Age: 68
End: 2023-01-03
Payer: MEDICARE

## 2023-01-03 NOTE — TELEPHONE ENCOUNTER
Pt thinks she may have had a TIA Saturday.  Pt had a h/a that started on 12/30/22. The h/a lasted 12/31 until 1/1/23.  It went away around noon. On the morning of the 12/31, her  was speaking to her and she could not answer him for about 30 seconds.  It took about 30 minutes for her to understand what he was asking.  BP was elevated during that time.  Pt was very tired 12/31/22 and 1/1/23.  She feels better since 1/1/23 at around noon.  Informed the pt if she thought she was having a TIA she should got to ED.    12/31/22--BP-148/79 , 160/90,   1/1/23--167/87  1/2//86   1/3//76    I

## 2023-01-03 NOTE — TELEPHONE ENCOUNTER
----- Message from Srinivasa Reinoso sent at 1/3/2023  8:17 AM CST -----  Regarding: appt  Contact: ERIKA PEREZ [[id number]]  Type:  Same Day Appointment Request    Caller is requesting a same day appointment.      Name of Caller:  Erika    When is the first available appointment?  Dept book    Symptoms:  na    Best Call Back Number:  [phone]     Additional Information:   Pt feels like she had a TIA over the weekend and needs to be seen today. Please call to advise

## 2023-01-04 ENCOUNTER — TELEPHONE (OUTPATIENT)
Dept: FAMILY MEDICINE | Facility: CLINIC | Age: 68
End: 2023-01-04
Payer: MEDICARE

## 2023-01-04 NOTE — TELEPHONE ENCOUNTER
----- Message from Analy Payne sent at 1/4/2023  1:59 PM CST -----  Contact: Patient  Type: Needs Medical Advice  Who Called: Patient  Symptoms (please be specific): blood pressure high  How long has patient had these symptoms: n/a  Best Call Back Number: [phone]  Additional Information:Please contact patient to advise on symptoms

## 2023-01-05 ENCOUNTER — PATIENT MESSAGE (OUTPATIENT)
Dept: FAMILY MEDICINE | Facility: CLINIC | Age: 68
End: 2023-01-05
Payer: MEDICARE

## 2023-01-05 ENCOUNTER — PATIENT MESSAGE (OUTPATIENT)
Dept: NEUROLOGY | Facility: CLINIC | Age: 68
End: 2023-01-05
Payer: MEDICARE

## 2023-01-05 NOTE — TELEPHONE ENCOUNTER
Called and discussed with patient. She has appt with neurology tomorrow    Sounds like a seizure after episode of hypertension and elevated BP. She may need imaging with CT to r/o a cause for headaches and breakthrough seizures.     Headache resolved at this time and BP normalized

## 2023-01-05 NOTE — TELEPHONE ENCOUNTER
Pt notified and verbalized understanding to go to the ED if this happens again.  Appt scheduled 1/6/23 with Erin Ontiveros NP.
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
## 2023-01-06 ENCOUNTER — OFFICE VISIT (OUTPATIENT)
Dept: NEUROLOGY | Facility: CLINIC | Age: 68
End: 2023-01-06
Payer: MEDICARE

## 2023-01-06 VITALS
BODY MASS INDEX: 24.55 KG/M2 | WEIGHT: 138.56 LBS | HEART RATE: 72 BPM | DIASTOLIC BLOOD PRESSURE: 74 MMHG | HEIGHT: 63 IN | SYSTOLIC BLOOD PRESSURE: 138 MMHG

## 2023-01-06 DIAGNOSIS — G40.909 SEIZURE DISORDER: Primary | ICD-10-CM

## 2023-01-06 DIAGNOSIS — G45.9 TIA (TRANSIENT ISCHEMIC ATTACK): ICD-10-CM

## 2023-01-06 DIAGNOSIS — D32.9 MENINGIOMA: ICD-10-CM

## 2023-01-06 PROCEDURE — 3078F PR MOST RECENT DIASTOLIC BLOOD PRESSURE < 80 MM HG: ICD-10-PCS | Mod: CPTII,S$GLB,, | Performed by: NURSE PRACTITIONER

## 2023-01-06 PROCEDURE — 99999 PR PBB SHADOW E&M-EST. PATIENT-LVL IV: ICD-10-PCS | Mod: PBBFAC,,, | Performed by: NURSE PRACTITIONER

## 2023-01-06 PROCEDURE — 3008F PR BODY MASS INDEX (BMI) DOCUMENTED: ICD-10-PCS | Mod: CPTII,S$GLB,, | Performed by: NURSE PRACTITIONER

## 2023-01-06 PROCEDURE — 1160F PR REVIEW ALL MEDS BY PRESCRIBER/CLIN PHARMACIST DOCUMENTED: ICD-10-PCS | Mod: CPTII,S$GLB,, | Performed by: NURSE PRACTITIONER

## 2023-01-06 PROCEDURE — 99215 OFFICE O/P EST HI 40 MIN: CPT | Mod: S$GLB,,, | Performed by: NURSE PRACTITIONER

## 2023-01-06 PROCEDURE — 1101F PT FALLS ASSESS-DOCD LE1/YR: CPT | Mod: CPTII,S$GLB,, | Performed by: NURSE PRACTITIONER

## 2023-01-06 PROCEDURE — 1159F PR MEDICATION LIST DOCUMENTED IN MEDICAL RECORD: ICD-10-PCS | Mod: CPTII,S$GLB,, | Performed by: NURSE PRACTITIONER

## 2023-01-06 PROCEDURE — 1160F RVW MEDS BY RX/DR IN RCRD: CPT | Mod: CPTII,S$GLB,, | Performed by: NURSE PRACTITIONER

## 2023-01-06 PROCEDURE — 3288F PR FALLS RISK ASSESSMENT DOCUMENTED: ICD-10-PCS | Mod: CPTII,S$GLB,, | Performed by: NURSE PRACTITIONER

## 2023-01-06 PROCEDURE — 1126F AMNT PAIN NOTED NONE PRSNT: CPT | Mod: CPTII,S$GLB,, | Performed by: NURSE PRACTITIONER

## 2023-01-06 PROCEDURE — 99215 PR OFFICE/OUTPT VISIT, EST, LEVL V, 40-54 MIN: ICD-10-PCS | Mod: S$GLB,,, | Performed by: NURSE PRACTITIONER

## 2023-01-06 PROCEDURE — 3078F DIAST BP <80 MM HG: CPT | Mod: CPTII,S$GLB,, | Performed by: NURSE PRACTITIONER

## 2023-01-06 PROCEDURE — 3008F BODY MASS INDEX DOCD: CPT | Mod: CPTII,S$GLB,, | Performed by: NURSE PRACTITIONER

## 2023-01-06 PROCEDURE — 99999 PR PBB SHADOW E&M-EST. PATIENT-LVL IV: CPT | Mod: PBBFAC,,, | Performed by: NURSE PRACTITIONER

## 2023-01-06 PROCEDURE — 3075F PR MOST RECENT SYSTOLIC BLOOD PRESS GE 130-139MM HG: ICD-10-PCS | Mod: CPTII,S$GLB,, | Performed by: NURSE PRACTITIONER

## 2023-01-06 PROCEDURE — 3288F FALL RISK ASSESSMENT DOCD: CPT | Mod: CPTII,S$GLB,, | Performed by: NURSE PRACTITIONER

## 2023-01-06 PROCEDURE — 3075F SYST BP GE 130 - 139MM HG: CPT | Mod: CPTII,S$GLB,, | Performed by: NURSE PRACTITIONER

## 2023-01-06 PROCEDURE — 1159F MED LIST DOCD IN RCRD: CPT | Mod: CPTII,S$GLB,, | Performed by: NURSE PRACTITIONER

## 2023-01-06 PROCEDURE — 1126F PR PAIN SEVERITY QUANTIFIED, NO PAIN PRESENT: ICD-10-PCS | Mod: CPTII,S$GLB,, | Performed by: NURSE PRACTITIONER

## 2023-01-06 PROCEDURE — 1101F PR PT FALLS ASSESS DOC 0-1 FALLS W/OUT INJ PAST YR: ICD-10-PCS | Mod: CPTII,S$GLB,, | Performed by: NURSE PRACTITIONER

## 2023-01-06 RX ORDER — LAMOTRIGINE 100 MG/1
100 TABLET, EXTENDED RELEASE ORAL DAILY
Qty: 90 TABLET | Refills: 3 | Status: SHIPPED | OUTPATIENT
Start: 2023-01-06 | End: 2023-02-06

## 2023-01-06 NOTE — ASSESSMENT & PLAN NOTE
Patient is a 66 y/o female that presents for recent transient symptoms  -  follows Dr. Lira for seizure management.   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    Pt reports recent episode of staring and unresponsiveness with associated rhythmic chewing motions of her mouth that are consistent with seizures  Pt and spouse also report previous episodes of word finding difficulty with associated high blood pressure   - Neuro w/u thus far has been unrevealing  Pt reports compliance with AED   - Increase Lamictal to 400 mg XR QD   - Previously taking Keppra which gave her side effects. This has been discontinued   - she is also on CBD oil with THC as per pain MD  Obtain updated EEG and AED level  Obtain updated MRI brain OUT    State law as it pertains to driving and seizure safety has previously been discussed.

## 2023-01-06 NOTE — ASSESSMENT & PLAN NOTE
Left parietal lesion without mass effect likely representative of meningioma   Pt evaluated by neurosurgery. Surgical intervention not indicated   Obtain updated imaging

## 2023-01-20 ENCOUNTER — HOSPITAL ENCOUNTER (OUTPATIENT)
Dept: RADIOLOGY | Facility: HOSPITAL | Age: 68
Discharge: HOME | End: 2023-01-20
Attending: NURSE PRACTITIONER
Payer: MEDICARE

## 2023-01-20 DIAGNOSIS — D32.9 MENINGIOMA: ICD-10-CM

## 2023-01-20 DIAGNOSIS — G45.9 TIA (TRANSIENT ISCHEMIC ATTACK): ICD-10-CM

## 2023-01-20 PROCEDURE — 25500020 PHARM REV CODE 255: Mod: PO | Performed by: NURSE PRACTITIONER

## 2023-01-20 PROCEDURE — 70553 MRI BRAIN STEM W/O & W/DYE: CPT | Mod: TC,PO

## 2023-01-20 PROCEDURE — 70553 MRI BRAIN W WO CONTRAST: ICD-10-PCS | Mod: 26,,, | Performed by: RADIOLOGY

## 2023-01-20 PROCEDURE — A9585 GADOBUTROL INJECTION: HCPCS | Mod: PO | Performed by: NURSE PRACTITIONER

## 2023-01-20 PROCEDURE — 70553 MRI BRAIN STEM W/O & W/DYE: CPT | Mod: 26,,, | Performed by: RADIOLOGY

## 2023-01-20 RX ORDER — GADOBUTROL 604.72 MG/ML
6 INJECTION INTRAVENOUS
Status: COMPLETED | OUTPATIENT
Start: 2023-01-20 | End: 2023-01-20

## 2023-01-20 RX ADMIN — GADOBUTROL 6 ML: 604.72 INJECTION INTRAVENOUS at 10:01

## 2023-02-06 ENCOUNTER — OFFICE VISIT (OUTPATIENT)
Dept: NEUROLOGY | Facility: CLINIC | Age: 68
End: 2023-02-06
Payer: MEDICARE

## 2023-02-06 VITALS
DIASTOLIC BLOOD PRESSURE: 70 MMHG | BODY MASS INDEX: 24.92 KG/M2 | SYSTOLIC BLOOD PRESSURE: 137 MMHG | WEIGHT: 140.63 LBS | HEART RATE: 68 BPM

## 2023-02-06 DIAGNOSIS — D32.9 MENINGIOMA: ICD-10-CM

## 2023-02-06 DIAGNOSIS — G40.909 SEIZURE DISORDER: Primary | ICD-10-CM

## 2023-02-06 DIAGNOSIS — T88.7XXA MEDICATION SIDE EFFECT: ICD-10-CM

## 2023-02-06 PROCEDURE — 1159F MED LIST DOCD IN RCRD: CPT | Mod: CPTII,S$GLB,, | Performed by: PSYCHIATRY & NEUROLOGY

## 2023-02-06 PROCEDURE — 3078F PR MOST RECENT DIASTOLIC BLOOD PRESSURE < 80 MM HG: ICD-10-PCS | Mod: CPTII,S$GLB,, | Performed by: PSYCHIATRY & NEUROLOGY

## 2023-02-06 PROCEDURE — 3008F PR BODY MASS INDEX (BMI) DOCUMENTED: ICD-10-PCS | Mod: CPTII,S$GLB,, | Performed by: PSYCHIATRY & NEUROLOGY

## 2023-02-06 PROCEDURE — 1126F PR PAIN SEVERITY QUANTIFIED, NO PAIN PRESENT: ICD-10-PCS | Mod: CPTII,S$GLB,, | Performed by: PSYCHIATRY & NEUROLOGY

## 2023-02-06 PROCEDURE — 99215 PR OFFICE/OUTPT VISIT, EST, LEVL V, 40-54 MIN: ICD-10-PCS | Mod: S$GLB,,, | Performed by: PSYCHIATRY & NEUROLOGY

## 2023-02-06 PROCEDURE — 3008F BODY MASS INDEX DOCD: CPT | Mod: CPTII,S$GLB,, | Performed by: PSYCHIATRY & NEUROLOGY

## 2023-02-06 PROCEDURE — 99999 PR PBB SHADOW E&M-EST. PATIENT-LVL III: ICD-10-PCS | Mod: PBBFAC,,, | Performed by: PSYCHIATRY & NEUROLOGY

## 2023-02-06 PROCEDURE — 4010F ACE/ARB THERAPY RXD/TAKEN: CPT | Mod: CPTII,S$GLB,, | Performed by: PSYCHIATRY & NEUROLOGY

## 2023-02-06 PROCEDURE — 3288F PR FALLS RISK ASSESSMENT DOCUMENTED: ICD-10-PCS | Mod: CPTII,S$GLB,, | Performed by: PSYCHIATRY & NEUROLOGY

## 2023-02-06 PROCEDURE — 99999 PR PBB SHADOW E&M-EST. PATIENT-LVL III: CPT | Mod: PBBFAC,,, | Performed by: PSYCHIATRY & NEUROLOGY

## 2023-02-06 PROCEDURE — 3075F PR MOST RECENT SYSTOLIC BLOOD PRESS GE 130-139MM HG: ICD-10-PCS | Mod: CPTII,S$GLB,, | Performed by: PSYCHIATRY & NEUROLOGY

## 2023-02-06 PROCEDURE — 1126F AMNT PAIN NOTED NONE PRSNT: CPT | Mod: CPTII,S$GLB,, | Performed by: PSYCHIATRY & NEUROLOGY

## 2023-02-06 PROCEDURE — 3288F FALL RISK ASSESSMENT DOCD: CPT | Mod: CPTII,S$GLB,, | Performed by: PSYCHIATRY & NEUROLOGY

## 2023-02-06 PROCEDURE — 1101F PR PT FALLS ASSESS DOC 0-1 FALLS W/OUT INJ PAST YR: ICD-10-PCS | Mod: CPTII,S$GLB,, | Performed by: PSYCHIATRY & NEUROLOGY

## 2023-02-06 PROCEDURE — 1159F PR MEDICATION LIST DOCUMENTED IN MEDICAL RECORD: ICD-10-PCS | Mod: CPTII,S$GLB,, | Performed by: PSYCHIATRY & NEUROLOGY

## 2023-02-06 PROCEDURE — 3078F DIAST BP <80 MM HG: CPT | Mod: CPTII,S$GLB,, | Performed by: PSYCHIATRY & NEUROLOGY

## 2023-02-06 PROCEDURE — 99215 OFFICE O/P EST HI 40 MIN: CPT | Mod: S$GLB,,, | Performed by: PSYCHIATRY & NEUROLOGY

## 2023-02-06 PROCEDURE — 3075F SYST BP GE 130 - 139MM HG: CPT | Mod: CPTII,S$GLB,, | Performed by: PSYCHIATRY & NEUROLOGY

## 2023-02-06 PROCEDURE — 4010F PR ACE/ARB THEARPY RXD/TAKEN: ICD-10-PCS | Mod: CPTII,S$GLB,, | Performed by: PSYCHIATRY & NEUROLOGY

## 2023-02-06 PROCEDURE — 1101F PT FALLS ASSESS-DOCD LE1/YR: CPT | Mod: CPTII,S$GLB,, | Performed by: PSYCHIATRY & NEUROLOGY

## 2023-02-06 RX ORDER — LAMOTRIGINE 200 MG/1
400 TABLET, EXTENDED RELEASE ORAL DAILY
Qty: 60 TABLET | Refills: 11 | Status: SHIPPED | OUTPATIENT
Start: 2023-02-06 | End: 2024-03-27 | Stop reason: SDUPTHER

## 2023-02-10 ENCOUNTER — TELEPHONE (OUTPATIENT)
Dept: NEUROLOGY | Facility: CLINIC | Age: 68
End: 2023-02-10
Payer: MEDICARE

## 2023-02-10 DIAGNOSIS — G40.909 SEIZURE DISORDER: Primary | ICD-10-CM

## 2023-02-10 RX ORDER — ZONISAMIDE 100 MG/1
CAPSULE ORAL
Qty: 90 CAPSULE | Refills: 2 | Status: SHIPPED | OUTPATIENT
Start: 2023-02-10 | End: 2023-05-12 | Stop reason: SDUPTHER

## 2023-02-10 NOTE — TELEPHONE ENCOUNTER
LVM giving pt number to [facility] to see if there is any assistance that would help her with medication affordability. Informed her to give us a call back if that does not work for her.

## 2023-02-10 NOTE — TELEPHONE ENCOUNTER
OK.  I've written for Zonegran.  Verify that she does not have a history of kidney stones or glaucoma.

## 2023-02-10 NOTE — TELEPHONE ENCOUNTER
Spoke to pt. Pt unable to afford Briviact that was prescribed on 2/06/23. States has talked to someone at [facility] to see if Briviact would be cheaper or if pt would qualify for assistance with med. Pt states that did not work out for her and is requesting an alternative medication to Briviact. Please advise.

## 2023-02-10 NOTE — TELEPHONE ENCOUNTER
----- Message from Tosin Deutsch, Patient Care Assistant sent at 2/10/2023  9:25 AM CST -----  Contact: Pt  Type: Needs Medical Advice    Who Called: Pt  Best Call Back Number: [phone]  Inquiry/Question: Pt is calling to get an alternative medication for the new anti-seizure medication. Pt states it is too costly for her to purchase. Please advise. Thank you~

## 2023-02-10 NOTE — TELEPHONE ENCOUNTER
----- Message from Ramakrishna Rudd sent at 2/10/2023 12:33 PM CST -----  Regarding: Return call  Contact: Patient  Type:  Patient Returning Call    Who Called:patient  Who Left Message for Patient:Cortez  Does the patient know what this is regarding?:returning a call  Would the patient rather a call back or a response via [facility]? call  Best Call Back Number:[phone]    Additional Information: Patient was returning a call from [facility]. Please call patient back.

## 2023-02-13 ENCOUNTER — TELEPHONE (OUTPATIENT)
Dept: NEUROLOGY | Facility: CLINIC | Age: 68
End: 2023-02-13
Payer: MEDICARE

## 2023-02-13 NOTE — TELEPHONE ENCOUNTER
Pt notified, that I am sending the message to Dr. Lira.  Pt went to fill the Zonegran an was told by [facility] in contains sulfa.  She is allergic to Sulfa.  She was told she was allergic to sulfa in childhood.  She does not know what kind of reaction she had.

## 2023-02-13 NOTE — TELEPHONE ENCOUNTER
----- Message from Zack Pelaez sent at 2023  8:36 AM CST -----  Regarding: med change  Type:  Pharmacy Calling to Clarify an RX    Name of Caller:  Keena    Pharmacy Name:    [id number] - [place] [address]   [place] [address]  Phone: [phone] Fax: [phone]    Prescription Name:    zonisamide (ZONEGRAN) 100 MG Cap 90 capsule 2 2/10/2023  --  Si cap PO nightly x1 week, then 2 caps PO nightly x1 week, then 3 caps PO nightly thereafter  Sent to pharmacy as: zonisamide (ZONEGRAN) 100 MG Cap  Class: Normal  Order: [id number]  Date/Time Signed: 2/10/2023 16:54      E-Prescribing Status: Receipt confirmed by pharmacy (2/10/2023  4:54 PM CST)    What do they need to clarify?:  PT is allergic to SULFUR. Pharm told pt med contains Sulfur and did not want to fill.     Best Call Back Number:  [phone]    Additional Information:  please call with ok to fill or alt med

## 2023-03-01 ENCOUNTER — PES CALL (OUTPATIENT)
Dept: ADMINISTRATIVE | Facility: CLINIC | Age: 68
End: 2023-03-01
Payer: MEDICARE

## 2023-03-09 ENCOUNTER — TELEPHONE (OUTPATIENT)
Dept: NEUROLOGY | Facility: CLINIC | Age: 68
End: 2023-03-09
Payer: MEDICARE

## 2023-03-09 DIAGNOSIS — G40.909 SEIZURE DISORDER: Primary | ICD-10-CM

## 2023-03-09 NOTE — TELEPHONE ENCOUNTER
----- Message from Zack Pelaez sent at 3/9/2023  8:11 AM CST -----  Regarding: trough lab  Type: Needs Medical Advice    Who Called:  Keena Spain Call Back Number: [phone]    Additional Information: pt needs orders for fasting lab for zonisamide (ZONEGRAN) 100 MG Cap. Please call pt when labs are on chart so pt can schedule.

## 2023-03-10 ENCOUNTER — PES CALL (OUTPATIENT)
Dept: ADMINISTRATIVE | Facility: CLINIC | Age: 68
End: 2023-03-10
Payer: MEDICARE

## 2023-03-23 ENCOUNTER — OFFICE VISIT (OUTPATIENT)
Dept: NEUROLOGY | Facility: CLINIC | Age: 68
End: 2023-03-23
Payer: MEDICARE

## 2023-03-23 VITALS
WEIGHT: 137.38 LBS | DIASTOLIC BLOOD PRESSURE: 73 MMHG | SYSTOLIC BLOOD PRESSURE: 129 MMHG | BODY MASS INDEX: 24.34 KG/M2 | HEART RATE: 63 BPM | HEIGHT: 63 IN

## 2023-03-23 DIAGNOSIS — G40.909 SEIZURE DISORDER: ICD-10-CM

## 2023-03-23 PROCEDURE — 99213 OFFICE O/P EST LOW 20 MIN: CPT | Mod: S$GLB,,, | Performed by: NURSE PRACTITIONER

## 2023-03-23 PROCEDURE — 3008F BODY MASS INDEX DOCD: CPT | Mod: CPTII,S$GLB,, | Performed by: NURSE PRACTITIONER

## 2023-03-23 PROCEDURE — 1126F AMNT PAIN NOTED NONE PRSNT: CPT | Mod: CPTII,S$GLB,, | Performed by: NURSE PRACTITIONER

## 2023-03-23 PROCEDURE — 3074F PR MOST RECENT SYSTOLIC BLOOD PRESSURE < 130 MM HG: ICD-10-PCS | Mod: CPTII,S$GLB,, | Performed by: NURSE PRACTITIONER

## 2023-03-23 PROCEDURE — 3008F PR BODY MASS INDEX (BMI) DOCUMENTED: ICD-10-PCS | Mod: CPTII,S$GLB,, | Performed by: NURSE PRACTITIONER

## 2023-03-23 PROCEDURE — 4010F PR ACE/ARB THEARPY RXD/TAKEN: ICD-10-PCS | Mod: CPTII,S$GLB,, | Performed by: NURSE PRACTITIONER

## 2023-03-23 PROCEDURE — 99999 PR PBB SHADOW E&M-EST. PATIENT-LVL IV: CPT | Mod: PBBFAC,,, | Performed by: NURSE PRACTITIONER

## 2023-03-23 PROCEDURE — 99213 PR OFFICE/OUTPT VISIT, EST, LEVL III, 20-29 MIN: ICD-10-PCS | Mod: S$GLB,,, | Performed by: NURSE PRACTITIONER

## 2023-03-23 PROCEDURE — 3078F PR MOST RECENT DIASTOLIC BLOOD PRESSURE < 80 MM HG: ICD-10-PCS | Mod: CPTII,S$GLB,, | Performed by: NURSE PRACTITIONER

## 2023-03-23 PROCEDURE — 1159F MED LIST DOCD IN RCRD: CPT | Mod: CPTII,S$GLB,, | Performed by: NURSE PRACTITIONER

## 2023-03-23 PROCEDURE — 1160F RVW MEDS BY RX/DR IN RCRD: CPT | Mod: CPTII,S$GLB,, | Performed by: NURSE PRACTITIONER

## 2023-03-23 PROCEDURE — 3074F SYST BP LT 130 MM HG: CPT | Mod: CPTII,S$GLB,, | Performed by: NURSE PRACTITIONER

## 2023-03-23 PROCEDURE — 99999 PR PBB SHADOW E&M-EST. PATIENT-LVL IV: ICD-10-PCS | Mod: PBBFAC,,, | Performed by: NURSE PRACTITIONER

## 2023-03-23 PROCEDURE — 4010F ACE/ARB THERAPY RXD/TAKEN: CPT | Mod: CPTII,S$GLB,, | Performed by: NURSE PRACTITIONER

## 2023-03-23 PROCEDURE — 1159F PR MEDICATION LIST DOCUMENTED IN MEDICAL RECORD: ICD-10-PCS | Mod: CPTII,S$GLB,, | Performed by: NURSE PRACTITIONER

## 2023-03-23 PROCEDURE — 3078F DIAST BP <80 MM HG: CPT | Mod: CPTII,S$GLB,, | Performed by: NURSE PRACTITIONER

## 2023-03-23 PROCEDURE — 1160F PR REVIEW ALL MEDS BY PRESCRIBER/CLIN PHARMACIST DOCUMENTED: ICD-10-PCS | Mod: CPTII,S$GLB,, | Performed by: NURSE PRACTITIONER

## 2023-03-23 PROCEDURE — 1126F PR PAIN SEVERITY QUANTIFIED, NO PAIN PRESENT: ICD-10-PCS | Mod: CPTII,S$GLB,, | Performed by: NURSE PRACTITIONER
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
## 2023-03-23 NOTE — ASSESSMENT & PLAN NOTE
Patient is a 66 y/o female that presents for seizure f/u  -  she follows Dr. Lira for seizure management.   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    Pt reports recent episodes of staring and unresponsiveness with associated rhythmic chewing motions of her mouth that are consistent with seizures. Last episode 1/29/2023    Pt reports compliance with AED   - cont Lamictal to 400 mg XR QD   - Briviact initiated at her last appt but too costly. She was then initiated on Zonegran 300 mg QHS and tolerating well   - Previously taking Keppra which gave her side effects.    - she is also on CBD oil with THC as per pain MD  Serologies noted   - follow LFT trends  MRI brain scan neg acute   - stable meningioma  OK to take B12 supplement for energy support  State law as it pertains to driving and seizure safety has previously been discussed.

## 2023-03-23 NOTE — PATIENT INSTRUCTIONS
Seizure education.      No driving for now, no swimming alone, no climbing high areas, no operation of heavy machinery or working with high risk electricity equipment.    Continue to take AEDs as prescribed. If any major side effects from neurological medications go to the ED including mood changes and severe depression.      Patient and/or next of kin informed.      Medication side effects discussed with the patient and/or caregiver.   ------------------------------------------------------------------------------------------------------------------------------------------------  Avoid:  - Benadryl (diphenhydramine)  - estrogens  - Tramadol (Ultram)  - Certain antibiotics in the fluoroquinolone class (levaquin or cipro)  - Wellbutrin   - Chantix  - Avoid more than 2 drinks of alcohol  - Drugs or stimulant medications

## 2023-05-12 ENCOUNTER — TELEPHONE (OUTPATIENT)
Dept: NEUROLOGY | Facility: CLINIC | Age: 68
End: 2023-05-12
Payer: MEDICARE

## 2023-05-12 DIAGNOSIS — G40.909 SEIZURE DISORDER: ICD-10-CM

## 2023-05-12 DIAGNOSIS — D32.9 MENINGIOMA: Primary | ICD-10-CM

## 2023-05-12 RX ORDER — ZONISAMIDE 100 MG/1
300 CAPSULE ORAL NIGHTLY
Qty: 90 CAPSULE | Refills: 5 | Status: SHIPPED | OUTPATIENT
Start: 2023-05-12 | End: 2023-11-07

## 2023-05-12 NOTE — TELEPHONE ENCOUNTER
----- Message from Srinivasa Reinoso sent at 5/12/2023  2:45 PM CDT -----  Regarding: advice  Contact: ERIKA PEREZ [[id number]]  Type: Needs Medical Advice  Who Called:  Erika    Symptoms (please be specific):  na    How long has patient had these symptoms:  na    Pharmacy name and phone #:    [id number] [place] - [address]  [address]  [place] [address]  Phone: [phone] Fax: [phone]    Best Call Back Number: [phone]    Additional Information: [facility] did not fill as written for Zonisamide 100 mg. About to run out, weeks worth left. Please call to advise.

## 2023-05-15 ENCOUNTER — HOSPITAL ENCOUNTER (OUTPATIENT)
Dept: RADIOLOGY | Facility: HOSPITAL | Age: 68
Discharge: HOME | End: 2023-05-15
Attending: INTERNAL MEDICINE
Payer: MEDICARE

## 2023-05-15 DIAGNOSIS — D32.9 MENINGIOMA: ICD-10-CM

## 2023-05-15 PROCEDURE — 25500020 PHARM REV CODE 255: Mod: PO | Performed by: INTERNAL MEDICINE

## 2023-05-15 PROCEDURE — 70553 MRI BRAIN STEM W/O & W/DYE: CPT | Mod: 26,,, | Performed by: RADIOLOGY

## 2023-05-15 PROCEDURE — 70553 MRI BRAIN W WO CONTRAST: ICD-10-PCS | Mod: 26,,, | Performed by: RADIOLOGY

## 2023-05-15 PROCEDURE — A9585 GADOBUTROL INJECTION: HCPCS | Mod: PO | Performed by: INTERNAL MEDICINE

## 2023-05-15 PROCEDURE — 70553 MRI BRAIN STEM W/O & W/DYE: CPT | Mod: TC,PO

## 2023-05-15 RX ORDER — GADOBUTROL 604.72 MG/ML
7.5 INJECTION INTRAVENOUS
Status: COMPLETED | OUTPATIENT
Start: 2023-05-15 | End: 2023-05-15

## 2023-05-15 RX ADMIN — GADOBUTROL 6 ML: 604.72 INJECTION INTRAVENOUS at 09:05

## 2023-05-19 ENCOUNTER — PATIENT MESSAGE (OUTPATIENT)
Dept: FAMILY MEDICINE | Facility: CLINIC | Age: 68
End: 2023-05-19
Payer: MEDICARE

## 2023-05-29 ENCOUNTER — OFFICE VISIT (OUTPATIENT)
Dept: FAMILY MEDICINE | Facility: CLINIC | Age: 68
End: 2023-05-29
Payer: MEDICARE

## 2023-05-29 ENCOUNTER — OFFICE VISIT (OUTPATIENT)
Dept: NEUROLOGY | Facility: CLINIC | Age: 68
End: 2023-05-29
Payer: MEDICARE

## 2023-05-29 VITALS
HEART RATE: 62 BPM | DIASTOLIC BLOOD PRESSURE: 63 MMHG | WEIGHT: 137.38 LBS | SYSTOLIC BLOOD PRESSURE: 128 MMHG | BODY MASS INDEX: 24.33 KG/M2

## 2023-05-29 VITALS
WEIGHT: 136.25 LBS | OXYGEN SATURATION: 97 % | HEIGHT: 63 IN | HEART RATE: 75 BPM | BODY MASS INDEX: 24.14 KG/M2 | SYSTOLIC BLOOD PRESSURE: 118 MMHG | DIASTOLIC BLOOD PRESSURE: 66 MMHG

## 2023-05-29 DIAGNOSIS — K76.0 NAFLD (NONALCOHOLIC FATTY LIVER DISEASE): ICD-10-CM

## 2023-05-29 DIAGNOSIS — I10 ESSENTIAL HYPERTENSION: ICD-10-CM

## 2023-05-29 DIAGNOSIS — I10 PRIMARY HYPERTENSION: ICD-10-CM

## 2023-05-29 DIAGNOSIS — G40.909 SEIZURE DISORDER: Primary | ICD-10-CM

## 2023-05-29 DIAGNOSIS — Z12.31 ENCOUNTER FOR SCREENING MAMMOGRAM FOR MALIGNANT NEOPLASM OF BREAST: ICD-10-CM

## 2023-05-29 DIAGNOSIS — F43.20 ADJUSTMENT DISORDER, UNSPECIFIED TYPE: ICD-10-CM

## 2023-05-29 DIAGNOSIS — F33.0 MAJOR DEPRESSIVE DISORDER, RECURRENT, MILD: ICD-10-CM

## 2023-05-29 DIAGNOSIS — Z00.00 ENCOUNTER FOR PREVENTIVE HEALTH EXAMINATION: Primary | ICD-10-CM

## 2023-05-29 DIAGNOSIS — G40.909 SEIZURE DISORDER: ICD-10-CM

## 2023-05-29 DIAGNOSIS — E78.5 HYPERLIPIDEMIA, UNSPECIFIED HYPERLIPIDEMIA TYPE: ICD-10-CM

## 2023-05-29 PROCEDURE — 3078F PR MOST RECENT DIASTOLIC BLOOD PRESSURE < 80 MM HG: ICD-10-PCS | Mod: CPTII,S$GLB,, | Performed by: NURSE PRACTITIONER

## 2023-05-29 PROCEDURE — 4010F PR ACE/ARB THEARPY RXD/TAKEN: ICD-10-PCS | Mod: CPTII,S$GLB,, | Performed by: PSYCHIATRY & NEUROLOGY

## 2023-05-29 PROCEDURE — 1126F AMNT PAIN NOTED NONE PRSNT: CPT | Mod: CPTII,S$GLB,, | Performed by: PSYCHIATRY & NEUROLOGY

## 2023-05-29 PROCEDURE — 1160F RVW MEDS BY RX/DR IN RCRD: CPT | Mod: CPTII,S$GLB,, | Performed by: NURSE PRACTITIONER

## 2023-05-29 PROCEDURE — 1159F MED LIST DOCD IN RCRD: CPT | Mod: CPTII,S$GLB,, | Performed by: NURSE PRACTITIONER

## 2023-05-29 PROCEDURE — 3288F PR FALLS RISK ASSESSMENT DOCUMENTED: ICD-10-PCS | Mod: CPTII,S$GLB,, | Performed by: PSYCHIATRY & NEUROLOGY

## 2023-05-29 PROCEDURE — 4010F ACE/ARB THERAPY RXD/TAKEN: CPT | Mod: CPTII,S$GLB,, | Performed by: PSYCHIATRY & NEUROLOGY

## 2023-05-29 PROCEDURE — 3074F SYST BP LT 130 MM HG: CPT | Mod: CPTII,S$GLB,, | Performed by: PSYCHIATRY & NEUROLOGY

## 2023-05-29 PROCEDURE — 3074F SYST BP LT 130 MM HG: CPT | Mod: CPTII,S$GLB,, | Performed by: NURSE PRACTITIONER

## 2023-05-29 PROCEDURE — 1170F FXNL STATUS ASSESSED: CPT | Mod: CPTII,S$GLB,, | Performed by: NURSE PRACTITIONER

## 2023-05-29 PROCEDURE — 3288F PR FALLS RISK ASSESSMENT DOCUMENTED: ICD-10-PCS | Mod: CPTII,S$GLB,, | Performed by: NURSE PRACTITIONER

## 2023-05-29 PROCEDURE — 3008F BODY MASS INDEX DOCD: CPT | Mod: CPTII,S$GLB,, | Performed by: PSYCHIATRY & NEUROLOGY

## 2023-05-29 PROCEDURE — 1101F PT FALLS ASSESS-DOCD LE1/YR: CPT | Mod: CPTII,S$GLB,, | Performed by: PSYCHIATRY & NEUROLOGY

## 2023-05-29 PROCEDURE — 1101F PR PT FALLS ASSESS DOC 0-1 FALLS W/OUT INJ PAST YR: ICD-10-PCS | Mod: CPTII,S$GLB,, | Performed by: PSYCHIATRY & NEUROLOGY

## 2023-05-29 PROCEDURE — 1160F PR REVIEW ALL MEDS BY PRESCRIBER/CLIN PHARMACIST DOCUMENTED: ICD-10-PCS | Mod: CPTII,S$GLB,, | Performed by: NURSE PRACTITIONER

## 2023-05-29 PROCEDURE — 3288F FALL RISK ASSESSMENT DOCD: CPT | Mod: CPTII,S$GLB,, | Performed by: NURSE PRACTITIONER

## 2023-05-29 PROCEDURE — 1159F PR MEDICATION LIST DOCUMENTED IN MEDICAL RECORD: ICD-10-PCS | Mod: CPTII,S$GLB,, | Performed by: NURSE PRACTITIONER

## 2023-05-29 PROCEDURE — 4010F ACE/ARB THERAPY RXD/TAKEN: CPT | Mod: CPTII,S$GLB,, | Performed by: NURSE PRACTITIONER

## 2023-05-29 PROCEDURE — 1101F PT FALLS ASSESS-DOCD LE1/YR: CPT | Mod: CPTII,S$GLB,, | Performed by: NURSE PRACTITIONER

## 2023-05-29 PROCEDURE — 3078F DIAST BP <80 MM HG: CPT | Mod: CPTII,S$GLB,, | Performed by: NURSE PRACTITIONER

## 2023-05-29 PROCEDURE — 3078F DIAST BP <80 MM HG: CPT | Mod: CPTII,S$GLB,, | Performed by: PSYCHIATRY & NEUROLOGY

## 2023-05-29 PROCEDURE — 1126F PR PAIN SEVERITY QUANTIFIED, NO PAIN PRESENT: ICD-10-PCS | Mod: CPTII,S$GLB,, | Performed by: PSYCHIATRY & NEUROLOGY

## 2023-05-29 PROCEDURE — 99999 PR PBB SHADOW E&M-EST. PATIENT-LVL III: ICD-10-PCS | Mod: PBBFAC,,, | Performed by: PSYCHIATRY & NEUROLOGY

## 2023-05-29 PROCEDURE — 1159F MED LIST DOCD IN RCRD: CPT | Mod: CPTII,S$GLB,, | Performed by: PSYCHIATRY & NEUROLOGY

## 2023-05-29 PROCEDURE — 3008F PR BODY MASS INDEX (BMI) DOCUMENTED: ICD-10-PCS | Mod: CPTII,S$GLB,, | Performed by: PSYCHIATRY & NEUROLOGY

## 2023-05-29 PROCEDURE — 3288F FALL RISK ASSESSMENT DOCD: CPT | Mod: CPTII,S$GLB,, | Performed by: PSYCHIATRY & NEUROLOGY

## 2023-05-29 PROCEDURE — 1159F PR MEDICATION LIST DOCUMENTED IN MEDICAL RECORD: ICD-10-PCS | Mod: CPTII,S$GLB,, | Performed by: PSYCHIATRY & NEUROLOGY

## 2023-05-29 PROCEDURE — 1126F PR PAIN SEVERITY QUANTIFIED, NO PAIN PRESENT: ICD-10-PCS | Mod: CPTII,S$GLB,, | Performed by: NURSE PRACTITIONER

## 2023-05-29 PROCEDURE — 99999 PR PBB SHADOW E&M-EST. PATIENT-LVL IV: ICD-10-PCS | Mod: PBBFAC,,, | Performed by: NURSE PRACTITIONER

## 2023-05-29 PROCEDURE — 4010F PR ACE/ARB THEARPY RXD/TAKEN: ICD-10-PCS | Mod: CPTII,S$GLB,, | Performed by: NURSE PRACTITIONER

## 2023-05-29 PROCEDURE — 3008F BODY MASS INDEX DOCD: CPT | Mod: CPTII,S$GLB,, | Performed by: NURSE PRACTITIONER

## 2023-05-29 PROCEDURE — 3078F PR MOST RECENT DIASTOLIC BLOOD PRESSURE < 80 MM HG: ICD-10-PCS | Mod: CPTII,S$GLB,, | Performed by: PSYCHIATRY & NEUROLOGY

## 2023-05-29 PROCEDURE — 3008F PR BODY MASS INDEX (BMI) DOCUMENTED: ICD-10-PCS | Mod: CPTII,S$GLB,, | Performed by: NURSE PRACTITIONER

## 2023-05-29 PROCEDURE — 1126F AMNT PAIN NOTED NONE PRSNT: CPT | Mod: CPTII,S$GLB,, | Performed by: NURSE PRACTITIONER

## 2023-05-29 PROCEDURE — 3074F PR MOST RECENT SYSTOLIC BLOOD PRESSURE < 130 MM HG: ICD-10-PCS | Mod: CPTII,S$GLB,, | Performed by: PSYCHIATRY & NEUROLOGY

## 2023-05-29 PROCEDURE — 3074F PR MOST RECENT SYSTOLIC BLOOD PRESSURE < 130 MM HG: ICD-10-PCS | Mod: CPTII,S$GLB,, | Performed by: NURSE PRACTITIONER

## 2023-05-29 PROCEDURE — 1101F PR PT FALLS ASSESS DOC 0-1 FALLS W/OUT INJ PAST YR: ICD-10-PCS | Mod: CPTII,S$GLB,, | Performed by: NURSE PRACTITIONER

## 2023-05-29 PROCEDURE — G0439 PPPS, SUBSEQ VISIT: HCPCS | Mod: S$GLB,,, | Performed by: NURSE PRACTITIONER

## 2023-05-29 PROCEDURE — 99999 PR PBB SHADOW E&M-EST. PATIENT-LVL III: CPT | Mod: PBBFAC,,, | Performed by: PSYCHIATRY & NEUROLOGY

## 2023-05-29 PROCEDURE — G0439 PR MEDICARE ANNUAL WELLNESS SUBSEQUENT VISIT: ICD-10-PCS | Mod: S$GLB,,, | Performed by: NURSE PRACTITIONER

## 2023-05-29 PROCEDURE — 99213 PR OFFICE/OUTPT VISIT, EST, LEVL III, 20-29 MIN: ICD-10-PCS | Mod: S$GLB,,, | Performed by: PSYCHIATRY & NEUROLOGY

## 2023-05-29 PROCEDURE — 1170F PR FUNCTIONAL STATUS ASSESSED: ICD-10-PCS | Mod: CPTII,S$GLB,, | Performed by: NURSE PRACTITIONER

## 2023-05-29 PROCEDURE — 99999 PR PBB SHADOW E&M-EST. PATIENT-LVL IV: CPT | Mod: PBBFAC,,, | Performed by: NURSE PRACTITIONER

## 2023-05-29 PROCEDURE — 99213 OFFICE O/P EST LOW 20 MIN: CPT | Mod: S$GLB,,, | Performed by: PSYCHIATRY & NEUROLOGY

## 2023-05-29 RX ORDER — VENLAFAXINE HYDROCHLORIDE 150 MG/1
150 CAPSULE, EXTENDED RELEASE ORAL DAILY
Qty: 90 CAPSULE | Refills: 3 | Status: SHIPPED | OUTPATIENT
Start: 2023-05-29 | End: 2023-12-13

## 2023-05-29 RX ORDER — HYDROCHLOROTHIAZIDE 25 MG/1
25 TABLET ORAL DAILY
Qty: 90 TABLET | Refills: 3 | Status: SHIPPED | OUTPATIENT
Start: 2023-05-29 | End: 2024-01-08

## 2023-05-29 RX ORDER — LOSARTAN POTASSIUM 50 MG/1
50 TABLET ORAL DAILY
Qty: 90 TABLET | Refills: 3 | Status: SHIPPED | OUTPATIENT
Start: 2023-05-29 | End: 2024-01-08

## 2023-05-29 RX ORDER — ROSUVASTATIN CALCIUM 20 MG/1
20 TABLET, COATED ORAL DAILY
Qty: 90 TABLET | Refills: 3 | Status: SHIPPED | OUTPATIENT
Start: 2023-05-29

## 2023-05-29 NOTE — PATIENT INSTRUCTIONS
Counseling and Referral of Other Preventative  (Italic type indicates deductible and co-insurance are waived)    Patient Name: Keena Banks  Today's Date: 5/29/2023    Health Maintenance       Date Due Completion Date    COVID-19 Vaccine (4 - Booster for Pfizer series) 02/04/2022 12/10/2021    Mammogram 06/28/2023 6/28/2022    Lipid Panel 06/06/2023 6/6/2022    High Dose Statin 03/23/2024 3/23/2023    Aspirin/Antiplatelet Therapy 03/23/2024 3/23/2023    Colorectal Cancer Screening 08/10/2024 8/10/2016    DEXA Scan 11/10/2024 11/10/2021    TETANUS VACCINE 05/22/2029 5/22/2019        No orders of the defined types were placed in this encounter.    The following information is provided to all patients.  This information is to help you find resources for any of the problems found today that may be affecting your health:                Living healthy guide: www.[facility].[place].[facility]      Understanding Diabetes: www.diabetes.org      Eating healthy: www.cdc.gov/healthyweight      [facility] home safety checklist: www.cdc.gov/steadi/patient.html      Agency on Aging: www.goea.[place].[facility]      Alcoholics anonymous (AA): www.aa.org      Physical Activity: www.stacey.nih.gov/sy9jtus      Tobacco use: www.quitwithusla.org
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
## 2023-05-29 NOTE — PROGRESS NOTES
"Keena Banks presented for a  Medicare AWV and comprehensive Health Risk Assessment today. The following components were reviewed and updated:    Medical history  Family History  Social history  Allergies and Current Medications  Health Risk Assessment  Health Maintenance  Care Team     ** See Completed Assessments for Annual Wellness Visit within the encounter summary.**     The following assessments were completed:  Living Situation  CAGE  Depression Screening  Timed Get Up and Go  Whisper Test  Cognitive Function Screening      Nutrition Screening  ADL Screening  PAQ Screening    Review for Opioid Screening: Pt does not have Rx for Opioids  Review for Substance Use Disorders: Patient does not use substance      Vitals:    05/29/23 0901   BP: 118/66   BP Location: Left arm   Patient Position: Sitting   BP Method: Medium (Manual)   Pulse: 75   SpO2: 97%   Weight: 61.8 kg (136 lb 3.9 oz)   Height: 5' 3" (1.6 m)     Body mass index is 24.13 kg/m².  Physical Exam  Vitals reviewed.   Constitutional:       General: She is not in acute distress.  Pulmonary:      Effort: Pulmonary effort is normal. No respiratory distress.   Neurological:      Mental Status: She is alert and oriented to person, place, and time.   Psychiatric:         Mood and Affect: Mood normal.         Behavior: Behavior normal.         Thought Content: Thought content normal.         Judgment: Judgment normal.     Diagnoses and health risks identified today and associated recommendations/orders:    1. Encounter for preventive health examination  Reviewed and discussed health maintenance.    - Mammo Digital Screening Bilat w/ Eugene; Future    2. Encounter for screening mammogram for malignant neoplasm of breast  - Mammo Digital Screening Bilat w/ Eugene; Future    3. Seizure disorder  Stable- continue current treatment and follow up routinely with PCP and neurology ()    4. Major depressive disorder, recurrent, mild  Stable- continue current " treatment and follow up routinely with PCP   - venlafaxine (EFFEXOR-XR) 150 MG Cp24; Take 1 capsule (150 mg total) by mouth once daily.  Dispense: 90 capsule; Refill: 3    5. Adjustment disorder, unspecified type  Stable- continue current treatment and follow up routinely with PCP     6. Hyperlipidemia, unspecified hyperlipidemia type  Stable- continue current treatment and follow up routinely with PCP and cardiology ()  - rosuvastatin (CRESTOR) 20 MG tablet; Take 1 tablet (20 mg total) by mouth once daily.  Dispense: 90 tablet; Refill: 3    7. Primary hypertension  Stable- continue current treatment and follow up routinely with PCP and cardiology ()    8. NAFLD (nonalcoholic fatty liver disease)  Stable- continue current treatment and follow up routinely with PCP   Encouraged healthy eating, weight loss and routine exercise     9. Essential hypertension  Stable- continue current treatment and follow up routinely with PCP   - hydroCHLOROthiazide (HYDRODIURIL) 25 MG tablet; Take 1 tablet (25 mg total) by mouth once daily.  Dispense: 90 tablet; Refill: 3  - losartan (COZAAR) 50 MG tablet; Take 1 tablet (50 mg total) by mouth once daily.  Dispense: 90 tablet; Refill: 3    Provided Keena with a 5-10 year written screening schedule and personal prevention plan. Recommendations were developed using the USPSTF age appropriate recommendations. Education, counseling, and referrals were provided as needed. After Visit Summary printed and given to patient which includes a list of additional screenings\tests needed.    I offered to discuss advanced care planning, including how to pick a person who would make decisions for you if you were unable to make them for yourself, called a health care power of , and what kind of decisions you might make such as use of life sustaining treatments such as ventilators and tube feeding when faced with a life limiting illness recorded on a living will that they will  need to know. (How you want to be cared for as you near the end of your natural life)   X Patient is interested in learning more about how to make advanced directives.  I provided them paperwork and offered to discuss this with them.  Ana Xavier, NP

## 2023-06-06 ENCOUNTER — TELEPHONE (OUTPATIENT)
Dept: FAMILY MEDICINE | Facility: CLINIC | Age: 68
End: 2023-06-06

## 2023-06-06 DIAGNOSIS — R39.9 UTI SYMPTOMS: Primary | ICD-10-CM

## 2023-06-06 NOTE — TELEPHONE ENCOUNTER
KELLY      Pt returned call to reschedule appt.  Appt was rescheduled and pt is aware of the time and date.  Pt also reports uti symptoms.  For a couple of days she has been having spasms on completion and now has started having burning.  Pt is going to get UA c reflex.  Advised not to take any otc meds until after urine sample dropped off.

## 2023-06-11 ENCOUNTER — PATIENT MESSAGE (OUTPATIENT)
Dept: FAMILY MEDICINE | Facility: CLINIC | Age: 68
End: 2023-06-11
Payer: MEDICARE

## 2023-06-11 RX ORDER — AMOXICILLIN 875 MG/1
875 TABLET, FILM COATED ORAL 2 TIMES DAILY
Qty: 20 TABLET | Refills: 0 | Status: SHIPPED | OUTPATIENT
Start: 2023-06-11 | End: 2023-12-13

## 2023-06-12 ENCOUNTER — OFFICE VISIT (OUTPATIENT)
Dept: FAMILY MEDICINE | Facility: CLINIC | Age: 68
End: 2023-06-12
Payer: MEDICARE

## 2023-06-12 VITALS
BODY MASS INDEX: 23.12 KG/M2 | OXYGEN SATURATION: 97 % | WEIGHT: 130.5 LBS | SYSTOLIC BLOOD PRESSURE: 124 MMHG | HEIGHT: 63 IN | DIASTOLIC BLOOD PRESSURE: 80 MMHG | TEMPERATURE: 98 F | HEART RATE: 66 BPM

## 2023-06-12 DIAGNOSIS — Z78.0 ASYMPTOMATIC MENOPAUSAL STATE: ICD-10-CM

## 2023-06-12 DIAGNOSIS — F33.0 MAJOR DEPRESSIVE DISORDER, RECURRENT, MILD: ICD-10-CM

## 2023-06-12 DIAGNOSIS — F41.1 GAD (GENERALIZED ANXIETY DISORDER): ICD-10-CM

## 2023-06-12 DIAGNOSIS — M19.049 HAND ARTHRITIS: ICD-10-CM

## 2023-06-12 DIAGNOSIS — B95.1 URINARY TRACT INFECTION DUE TO STREPTOCOCCUS AGALACTIAE: ICD-10-CM

## 2023-06-12 DIAGNOSIS — K76.0 NAFLD (NONALCOHOLIC FATTY LIVER DISEASE): ICD-10-CM

## 2023-06-12 DIAGNOSIS — Z86.73 HISTORY OF TIA (TRANSIENT ISCHEMIC ATTACK): ICD-10-CM

## 2023-06-12 DIAGNOSIS — E78.5 HYPERLIPIDEMIA, UNSPECIFIED HYPERLIPIDEMIA TYPE: ICD-10-CM

## 2023-06-12 DIAGNOSIS — D32.9 MENINGIOMA: ICD-10-CM

## 2023-06-12 DIAGNOSIS — J30.9 CHRONIC ALLERGIC RHINITIS: ICD-10-CM

## 2023-06-12 DIAGNOSIS — I10 PRIMARY HYPERTENSION: ICD-10-CM

## 2023-06-12 DIAGNOSIS — G40.909 SEIZURE DISORDER: ICD-10-CM

## 2023-06-12 DIAGNOSIS — D64.9 ANEMIA, UNSPECIFIED TYPE: ICD-10-CM

## 2023-06-12 DIAGNOSIS — N39.0 URINARY TRACT INFECTION DUE TO STREPTOCOCCUS AGALACTIAE: ICD-10-CM

## 2023-06-12 DIAGNOSIS — Z85.72 HISTORY OF LYMPHOMA: ICD-10-CM

## 2023-06-12 DIAGNOSIS — I25.10 CORONARY ARTERY DISEASE INVOLVING NATIVE CORONARY ARTERY OF NATIVE HEART WITHOUT ANGINA PECTORIS: Primary | ICD-10-CM

## 2023-06-12 PROCEDURE — 1100F PTFALLS ASSESS-DOCD GE2>/YR: CPT | Mod: CPTII,S$GLB,, | Performed by: INTERNAL MEDICINE

## 2023-06-12 PROCEDURE — 4010F PR ACE/ARB THEARPY RXD/TAKEN: ICD-10-PCS | Mod: CPTII,S$GLB,, | Performed by: INTERNAL MEDICINE

## 2023-06-12 PROCEDURE — 4010F ACE/ARB THERAPY RXD/TAKEN: CPT | Mod: CPTII,S$GLB,, | Performed by: INTERNAL MEDICINE

## 2023-06-12 PROCEDURE — 1159F MED LIST DOCD IN RCRD: CPT | Mod: CPTII,S$GLB,, | Performed by: INTERNAL MEDICINE

## 2023-06-12 PROCEDURE — 3288F FALL RISK ASSESSMENT DOCD: CPT | Mod: CPTII,S$GLB,, | Performed by: INTERNAL MEDICINE

## 2023-06-12 PROCEDURE — 99214 OFFICE O/P EST MOD 30 MIN: CPT | Mod: S$GLB,,, | Performed by: INTERNAL MEDICINE

## 2023-06-12 PROCEDURE — 1160F RVW MEDS BY RX/DR IN RCRD: CPT | Mod: CPTII,S$GLB,, | Performed by: INTERNAL MEDICINE

## 2023-06-12 PROCEDURE — 3074F PR MOST RECENT SYSTOLIC BLOOD PRESSURE < 130 MM HG: ICD-10-PCS | Mod: CPTII,S$GLB,, | Performed by: INTERNAL MEDICINE

## 2023-06-12 PROCEDURE — 99214 PR OFFICE/OUTPT VISIT, EST, LEVL IV, 30-39 MIN: ICD-10-PCS | Mod: S$GLB,,, | Performed by: INTERNAL MEDICINE

## 2023-06-12 PROCEDURE — 1126F PR PAIN SEVERITY QUANTIFIED, NO PAIN PRESENT: ICD-10-PCS | Mod: CPTII,S$GLB,, | Performed by: INTERNAL MEDICINE

## 2023-06-12 PROCEDURE — 3079F PR MOST RECENT DIASTOLIC BLOOD PRESSURE 80-89 MM HG: ICD-10-PCS | Mod: CPTII,S$GLB,, | Performed by: INTERNAL MEDICINE

## 2023-06-12 PROCEDURE — 1160F PR REVIEW ALL MEDS BY PRESCRIBER/CLIN PHARMACIST DOCUMENTED: ICD-10-PCS | Mod: CPTII,S$GLB,, | Performed by: INTERNAL MEDICINE

## 2023-06-12 PROCEDURE — 3074F SYST BP LT 130 MM HG: CPT | Mod: CPTII,S$GLB,, | Performed by: INTERNAL MEDICINE

## 2023-06-12 PROCEDURE — 3008F PR BODY MASS INDEX (BMI) DOCUMENTED: ICD-10-PCS | Mod: CPTII,S$GLB,, | Performed by: INTERNAL MEDICINE

## 2023-06-12 PROCEDURE — 1159F PR MEDICATION LIST DOCUMENTED IN MEDICAL RECORD: ICD-10-PCS | Mod: CPTII,S$GLB,, | Performed by: INTERNAL MEDICINE

## 2023-06-12 PROCEDURE — 1126F AMNT PAIN NOTED NONE PRSNT: CPT | Mod: CPTII,S$GLB,, | Performed by: INTERNAL MEDICINE

## 2023-06-12 PROCEDURE — 3079F DIAST BP 80-89 MM HG: CPT | Mod: CPTII,S$GLB,, | Performed by: INTERNAL MEDICINE

## 2023-06-12 PROCEDURE — 3288F PR FALLS RISK ASSESSMENT DOCUMENTED: ICD-10-PCS | Mod: CPTII,S$GLB,, | Performed by: INTERNAL MEDICINE

## 2023-06-12 PROCEDURE — 3008F BODY MASS INDEX DOCD: CPT | Mod: CPTII,S$GLB,, | Performed by: INTERNAL MEDICINE

## 2023-06-12 PROCEDURE — 1100F PR PT FALLS ASSESS DOC 2+ FALLS/FALL W/INJURY/YR: ICD-10-PCS | Mod: CPTII,S$GLB,, | Performed by: INTERNAL MEDICINE

## 2023-06-12 RX ORDER — FLUTICASONE PROPIONATE 50 MCG
2 SPRAY, SUSPENSION (ML) NASAL DAILY
Qty: 16 G | Refills: 6 | Status: SHIPPED | OUTPATIENT
Start: 2023-06-12

## 2023-06-12 NOTE — PROGRESS NOTES
Subjective:       Patient ID: Keena Banks is a 68 y.o. female.    Medication List with Changes/Refills   New Medications    FLUTICASONE PROPIONATE (FLONASE) 50 MCG/ACTUATION NASAL SPRAY    2 sprays (100 mcg total) by Each Nostril route once daily.   Current Medications    AMOXICILLIN (AMOXIL) 875 MG TABLET    Take 1 tablet (875 mg total) by mouth 2 (two) times daily.    ASPIRIN (ECOTRIN) 81 MG EC TABLET    Take 81 mg by mouth once daily.    CELECOXIB (CELEBREX) 200 MG CAPSULE    Take by mouth once daily.    HYDROCHLOROTHIAZIDE (HYDRODIURIL) 25 MG TABLET    Take 1 tablet (25 mg total) by mouth once daily.    LAMOTRIGINE XR (LAMICTAL XR) 200 MG TR24 XR TABLET    Take 2 tablets (400 mg total) by mouth once daily.    LOSARTAN (COZAAR) 50 MG TABLET    Take 1 tablet (50 mg total) by mouth once daily.    MULTIVITAMIN WITH MINERALS TABLET    Take 1 tablet by mouth once daily.    ROSUVASTATIN (CRESTOR) 20 MG TABLET    Take 1 tablet (20 mg total) by mouth once daily.    VENLAFAXINE (EFFEXOR-XR) 150 MG CP24    Take 1 capsule (150 mg total) by mouth once daily.    ZONISAMIDE (ZONEGRAN) 100 MG CAP    Take 3 capsules (300 mg total) by mouth every evening.       Chief Complaint: Follow-up  She is here today to f/u on her chronic medical issues.     She has one week of pain with urination, urgency and frequency. She has bladder spasms at the end of voiding. No fevers. No low back pain. No blood in urine. Urine culture done with labs last week grew group B strep infection. She was given amoxicillin and will pickup today.       She has hypertension and is taking HCTZ 25 mg qday and losartan 50 mg qday. She denies chest pain or shortness of breath. She had a workup with cardiology with an echo (EF 45% per patient) and cardiac cath that per patient showed non obstructive disease with 30% blockage in one vessel.      She has hyperlipidemia controlled on crestor 20 mg qday. Her lipids on 6/2023 were 166/76/86/64. She is taking aspirin  daily.     She does report intermittent sinus congestion with PND.  No coughing. No sinus pressure. No fevers. She will have a clear runny nose at times. No ear pain or sore throat.      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 She is taking lamictal  mg qday and zonegran 300 mg daily.  Zonegran was added after a breakthrough seizure in 1/2023. EEG on 1/2023 was normal. MRI on 1/2023 showed a stable small left parietal meningioma unchanged and mildly expanded empty sella.  She is followed by neurology and was seen on 5/2023.  No changes were made. She is tolerating this regimen with only mild bowel changes and fatigue. She is allowed to drive in July if she is seizure free.      She has a meningioma seen on MRI on 5/2022. She denies any headaches, nausea or vision changes. She was seen by neurosurgery on 6/2022 and advised to repeat imaging in one year and then f/u. Repeat MRI on 1/2023 was unchanged.      She had an ultrasound showing fatty infiltration of her liver. She has normal LFTs.       She had depression with anxiety and is taking venlafaxine 150 mg daily. She feels this dose controls her symptoms. She is sleeping well. She denies any anxiety symptoms.No suicidal ideations.      She has a history of NHL(stage III A diffuse large B cell lymphoma) s/p 8 cycles of chemotherapy with CHOP/rituxan. She was  seen by oncology annual and her last appt was on 9/2022.  She continues to be in remission and will f/u in one year.     She has continues pain and arthritis in her hands. She was seen by rheumatology on 7/2019 and dx with erosive ostearthritis.   She reports increasing pain especially in the middle finger of her right hand. Her DIP joints remain swollen and painful. Xray of her hands on 11/2020 showed advanced degenerative change in the interphalangeal joints of both hands, mostly involving the  DIP joints but also the PIP joints at several sites.  This has progressed involving the left 2nd DIP joint and right 3rd DIP joint.  She takes celebrex as needed with CBD oil.       She lives with her  and granddaughter (plays golf). She exercises regularly.  She tries to eat healthy. She denies any balance issues but did have a fall 2 weeks ago when she misstepped.  She has mild bruising of her left forearm but it is improving.      Colonoscopy---8/2016 repeat in 8 years  Mammogram----6/2022 neg---scheduled   DEXA-----11/2021 normal   Pap-----once since ANDREW neg  Tdap---5/2019  Influenza vaccine----12/2022  Pneumovax 23----11/2015  Prevnar 13----12/2016  Shingrex--- 6/2018 , 9/2020   Covid vaccine---3 doses      Review of Systems   Constitutional:  Positive for fatigue. Negative for appetite change, fever and unexpected weight change.   HENT:  Negative for congestion, ear pain, hearing loss, sore throat and trouble swallowing.    Eyes:  Negative for pain and visual disturbance.   Respiratory:  Negative for cough, chest tightness, shortness of breath and wheezing.    Cardiovascular:  Negative for chest pain, palpitations and leg swelling.   Gastrointestinal:  Negative for abdominal pain, blood in stool, constipation, diarrhea, nausea and vomiting.   Endocrine: Negative for polyuria.   Genitourinary:  Positive for dysuria, frequency and urgency. Negative for hematuria.   Musculoskeletal:  Positive for arthralgias. Negative for back pain and myalgias.   Skin:  Negative for rash.   Neurological:  Negative for dizziness, weakness, numbness and headaches.   Hematological:  Does not bruise/bleed easily.   Psychiatric/Behavioral:  Negative for dysphoric mood, sleep disturbance and suicidal ideas. The patient is not nervous/anxious.      Objective:      Vitals:    06/12/23 1025   BP: 124/80   BP Location: Left arm   Patient Position: Sitting   BP Method: Medium (Manual)   Pulse: 66   Temp: 98.2 °F (36.8 °C)   SpO2:  "97%   Weight: 59.2 kg (130 lb 8.2 oz)   Height: 5' 3" (1.6 m)     Body mass index is 23.12 kg/m².  Physical Exam    General appearance: No acute distress, cooperative  Eyes: PERRL, EOMI, conjunctiva clear  Ears: normal external ear and pinna, tm clear without drainage, canals clear  Nose: boggy erythematous mucosa with clear drainage  Throat: no exudates or erythema, tonsils not enlarged  Mouth: no sores or lesions, moist mucous membranes  Neck: FROM, soft, supple, no thyromegaly, no bruits  Lymph: no anterior or posterior cervical adenopathy  Heart::  Regular rate and rhythm, no murmur  Lung: Clear to ascultation bilaterally, no wheezing, no rales, no rhonchi, no distress  Abdomen: Soft, nontender, no distention, no hepatosplenomegaly, bowel sounds normal, no guarding, no rebound, no peritoneal signs  Skin: no rashes, no lesions, are on left forearm with swelling and tenderness  Extremities: no edema, no cyanosis  Neuro: CN 2-12 intact, 5/5 muscle strength upper and lower extremity bilaterally, 2+ DTRs UE and LE bilaterally, normal gait  Peripheral pulses: 2+ pedal pulses bilaterally, good perfusion and color  Musculoskeletal: FROM, good strenth, no tenderness  Joint: normal appearance, no swelling, no warmth, no deformity in all joints    Assessment:       1. Coronary artery disease involving native coronary artery of native heart without angina pectoris    2. Primary hypertension    3. Hyperlipidemia, unspecified hyperlipidemia type    4. History of TIA (transient ischemic attack)    5. Chronic allergic rhinitis    6. Urinary tract infection due to Streptococcus agalactiae    7. NAFLD (nonalcoholic fatty liver disease)    8. Meningioma    9. Seizure disorder    10. Anemia, unspecified type    11. Major depressive disorder, recurrent, mild    12. SORAYA (generalized anxiety disorder)    13. History of lymphoma    14. Hand arthritis    15. Asymptomatic menopausal state        Plan:       Coronary artery disease " involving native coronary artery of native heart without angina pectoris  Stable and no active symptoms.     Primary hypertension  Well controlled and continue current regimen.   -     CBC Auto Differential; Future; Expected date: 06/12/2023  -     Comprehensive Metabolic Panel; Future; Expected date: 06/12/2023    Hyperlipidemia, unspecified hyperlipidemia type  Good control on crestor and aspirin    History of TIA (transient ischemic attack)  Continue statin and aspirin    Chronic allergic rhinitis  Uncontrolled and will restart her flonase nightly for 3 weeks.   -     fluticasone propionate (FLONASE) 50 mcg/actuation nasal spray; 2 sprays (100 mcg total) by Each Nostril route once daily.  Dispense: 16 g; Refill: 6    Urinary tract infection due to Streptococcus agalactiae  Advised to start amoxicillin for 10 days    NAFLD (nonalcoholic fatty liver disease)  STable and continue to monitor LFTs    Meningioma  STable and very small. Continue to monitor    Seizure disorder  Improved on dual treatment with zonegran and lamictal. Continue to follow with neurology. She is clear to drive in July if no further seizures    Anemia, unspecified type  STable and mild    Major depressive disorder, recurrent, mild  Well controlled and continue current regimen.     SORAYA (generalized anxiety disorder)  Good control on effexor    History of lymphoma  No recurrence. Continue to follow with oncology    Hand arthritis  At baseline and continues to use celebrex as needed    Asymptomatic menopausal state  -     DXA Bone Density Axial Skeleton 1 or more sites; Future; Expected date: 06/12/2023    Follow up in about 6 months (around 12/12/2023) for chronic medical issues.

## 2023-06-20 ENCOUNTER — TELEPHONE (OUTPATIENT)
Dept: FAMILY MEDICINE | Facility: CLINIC | Age: 68
End: 2023-06-20
Payer: MEDICARE

## 2023-06-20 DIAGNOSIS — N30.01 ACUTE CYSTITIS WITH HEMATURIA: Primary | ICD-10-CM

## 2023-06-20 NOTE — TELEPHONE ENCOUNTER
Pt was seen last week and prescribed Amoxicillin. Pt still has 2 days left of the antibiotic . Pt says on Saturday , she started with urinary urgency , spasms . Pt leaving town next week on vacation and does not want to have another UTI . Please advise if pt needs to come in for another urine specimen at the end of week . Pls advise .--lp

## 2023-06-20 NOTE — TELEPHONE ENCOUNTER
----- Message from Kristal Best LPN sent at 6/19/2023  6:13 PM CDT -----  Contact: self    ----- Message -----  From: Alondra Hutchins  Sent: 6/19/2023   1:44 PM CDT  To: Fabián [facility]GOST[facility] Staff    Type: Needs Medical Advice  Who Called:  pt  Symptoms (please be specific):  uti    How long has patient had these symptoms:  2 days  Pharmacy name and phone #:    [id number] - [place] - [address]  [address]  [place] [address]  Phone: [phone] Fax: [phone]        Best Call Back Number: [phone]    Additional Information: Pt states she has 3 days left of rx for uti left and started 2 days ago feeling the onset of uti again.  Frequent urination and spasms. Pt states she did not have symptoms today only yesterday. Pt would like to speak with staff.    Thank you

## 2023-06-20 NOTE — TELEPHONE ENCOUNTER
Called pt and informed her that Dr. Lockwood wants a urine sample for U/A and cx. Offered to schedule pt lab appt and she said she will just walk in to the [facility] in [place].  Pt verbalizes understanding to go with full bladder.

## 2023-06-26 ENCOUNTER — PATIENT MESSAGE (OUTPATIENT)
Dept: FAMILY MEDICINE | Facility: CLINIC | Age: 68
End: 2023-06-26
Payer: MEDICARE

## 2023-06-26 RX ORDER — POTASSIUM CHLORIDE 20 MEQ/1
20 TABLET, EXTENDED RELEASE ORAL DAILY
Qty: 90 TABLET | Refills: 2 | Status: SHIPPED | OUTPATIENT
Start: 2023-06-26 | End: 2024-01-08

## 2023-07-06 ENCOUNTER — TELEPHONE (OUTPATIENT)
Dept: NEUROLOGY | Facility: CLINIC | Age: 68
End: 2023-07-06
Payer: MEDICARE

## 2023-07-20 ENCOUNTER — OFFICE VISIT (OUTPATIENT)
Dept: NEUROLOGY | Facility: CLINIC | Age: 68
End: 2023-07-20
Payer: MEDICARE

## 2023-07-20 VITALS
WEIGHT: 137.81 LBS | SYSTOLIC BLOOD PRESSURE: 124 MMHG | DIASTOLIC BLOOD PRESSURE: 71 MMHG | RESPIRATION RATE: 17 BRPM | BODY MASS INDEX: 24.41 KG/M2 | HEART RATE: 72 BPM

## 2023-07-20 DIAGNOSIS — G40.909 SEIZURE DISORDER: ICD-10-CM

## 2023-07-20 PROCEDURE — 1159F PR MEDICATION LIST DOCUMENTED IN MEDICAL RECORD: ICD-10-PCS | Mod: CPTII,S$GLB,, | Performed by: NURSE PRACTITIONER

## 2023-07-20 PROCEDURE — 3288F PR FALLS RISK ASSESSMENT DOCUMENTED: ICD-10-PCS | Mod: CPTII,S$GLB,, | Performed by: NURSE PRACTITIONER

## 2023-07-20 PROCEDURE — 1160F PR REVIEW ALL MEDS BY PRESCRIBER/CLIN PHARMACIST DOCUMENTED: ICD-10-PCS | Mod: CPTII,S$GLB,, | Performed by: NURSE PRACTITIONER

## 2023-07-20 PROCEDURE — 3008F BODY MASS INDEX DOCD: CPT | Mod: CPTII,S$GLB,, | Performed by: NURSE PRACTITIONER

## 2023-07-20 PROCEDURE — 1126F AMNT PAIN NOTED NONE PRSNT: CPT | Mod: CPTII,S$GLB,, | Performed by: NURSE PRACTITIONER

## 2023-07-20 PROCEDURE — 3074F PR MOST RECENT SYSTOLIC BLOOD PRESSURE < 130 MM HG: ICD-10-PCS | Mod: CPTII,S$GLB,, | Performed by: NURSE PRACTITIONER

## 2023-07-20 PROCEDURE — 1160F RVW MEDS BY RX/DR IN RCRD: CPT | Mod: CPTII,S$GLB,, | Performed by: NURSE PRACTITIONER

## 2023-07-20 PROCEDURE — 3078F PR MOST RECENT DIASTOLIC BLOOD PRESSURE < 80 MM HG: ICD-10-PCS | Mod: CPTII,S$GLB,, | Performed by: NURSE PRACTITIONER

## 2023-07-20 PROCEDURE — 3008F PR BODY MASS INDEX (BMI) DOCUMENTED: ICD-10-PCS | Mod: CPTII,S$GLB,, | Performed by: NURSE PRACTITIONER

## 2023-07-20 PROCEDURE — 1101F PR PT FALLS ASSESS DOC 0-1 FALLS W/OUT INJ PAST YR: ICD-10-PCS | Mod: CPTII,S$GLB,, | Performed by: NURSE PRACTITIONER

## 2023-07-20 PROCEDURE — 4010F PR ACE/ARB THEARPY RXD/TAKEN: ICD-10-PCS | Mod: CPTII,S$GLB,, | Performed by: NURSE PRACTITIONER

## 2023-07-20 PROCEDURE — 99213 OFFICE O/P EST LOW 20 MIN: CPT | Mod: S$GLB,,, | Performed by: NURSE PRACTITIONER

## 2023-07-20 PROCEDURE — 1159F MED LIST DOCD IN RCRD: CPT | Mod: CPTII,S$GLB,, | Performed by: NURSE PRACTITIONER

## 2023-07-20 PROCEDURE — 3074F SYST BP LT 130 MM HG: CPT | Mod: CPTII,S$GLB,, | Performed by: NURSE PRACTITIONER

## 2023-07-20 PROCEDURE — 4010F ACE/ARB THERAPY RXD/TAKEN: CPT | Mod: CPTII,S$GLB,, | Performed by: NURSE PRACTITIONER

## 2023-07-20 PROCEDURE — 99213 PR OFFICE/OUTPT VISIT, EST, LEVL III, 20-29 MIN: ICD-10-PCS | Mod: S$GLB,,, | Performed by: NURSE PRACTITIONER

## 2023-07-20 PROCEDURE — 99999 PR PBB SHADOW E&M-EST. PATIENT-LVL III: ICD-10-PCS | Mod: PBBFAC,,, | Performed by: NURSE PRACTITIONER

## 2023-07-20 PROCEDURE — 1126F PR PAIN SEVERITY QUANTIFIED, NO PAIN PRESENT: ICD-10-PCS | Mod: CPTII,S$GLB,, | Performed by: NURSE PRACTITIONER

## 2023-07-20 PROCEDURE — 3288F FALL RISK ASSESSMENT DOCD: CPT | Mod: CPTII,S$GLB,, | Performed by: NURSE PRACTITIONER

## 2023-07-20 PROCEDURE — 1101F PT FALLS ASSESS-DOCD LE1/YR: CPT | Mod: CPTII,S$GLB,, | Performed by: NURSE PRACTITIONER

## 2023-07-20 PROCEDURE — 99999 PR PBB SHADOW E&M-EST. PATIENT-LVL III: CPT | Mod: PBBFAC,,, | Performed by: NURSE PRACTITIONER

## 2023-07-20 PROCEDURE — 3078F DIAST BP <80 MM HG: CPT | Mod: CPTII,S$GLB,, | Performed by: NURSE PRACTITIONER

## 2023-07-20 NOTE — ASSESSMENT & PLAN NOTE
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    episodes of staring and unresponsiveness with associated rhythmic chewing motions of her mouth that are consistent with seizures. Last episode 1/29/2023     Pt reports compliance with AED   - cont Lamictal to 400 mg XR QD   - Briviact initiated at her last appt but too costly. She was then initiated on Zonegran 300 mg QHS and tolerating well. This dose was broken up to 100 mg in AM and 200 mg QHS for increased drowsiness.    - Previously taking Keppra which gave her side effects.    - she is also on CBD oil with THC as per pain MD  Serologies noted   - LFTs stabilized   MRI brain scan neg acute   - stable meningioma  OK to take B12 supplement for energy support  OK for pt drive as she has event free x 6 months
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
## 2023-07-24 ENCOUNTER — HOSPITAL ENCOUNTER (OUTPATIENT)
Dept: RADIOLOGY | Facility: HOSPITAL | Age: 68
Discharge: HOME | End: 2023-07-24
Attending: NURSE PRACTITIONER
Payer: MEDICARE

## 2023-07-24 DIAGNOSIS — Z12.31 ENCOUNTER FOR SCREENING MAMMOGRAM FOR MALIGNANT NEOPLASM OF BREAST: ICD-10-CM

## 2023-07-24 DIAGNOSIS — Z00.00 ENCOUNTER FOR PREVENTIVE HEALTH EXAMINATION: ICD-10-CM

## 2023-07-24 PROCEDURE — 77063 MAMMO DIGITAL SCREENING BILAT WITH TOMO: ICD-10-PCS | Mod: 26,GA,, | Performed by: RADIOLOGY

## 2023-07-24 PROCEDURE — 77067 MAMMO DIGITAL SCREENING BILAT WITH TOMO: ICD-10-PCS | Mod: 26,GA,, | Performed by: RADIOLOGY

## 2023-07-24 PROCEDURE — 77067 SCR MAMMO BI INCL CAD: CPT | Mod: 26,GA,, | Performed by: RADIOLOGY

## 2023-07-24 PROCEDURE — 77067 SCR MAMMO BI INCL CAD: CPT | Mod: GA,TC,PO

## 2023-07-24 PROCEDURE — 77063 BREAST TOMOSYNTHESIS BI: CPT | Mod: 26,GA,, | Performed by: RADIOLOGY

## 2023-09-22 ENCOUNTER — LAB VISIT (OUTPATIENT)
Dept: LAB | Facility: HOSPITAL | Age: 68
End: 2023-09-22
Attending: NURSE PRACTITIONER
Payer: MEDICARE

## 2023-09-22 DIAGNOSIS — Z85.72 HISTORY OF LYMPHOMA: ICD-10-CM

## 2023-09-22 LAB
ALBUMIN SERPL BCP-MCNC: 4.5 G/DL (ref 3.5–5.2)
ALP SERPL-CCNC: 111 U/L (ref 55–135)
ALT SERPL W/O P-5'-P-CCNC: 21 U/L (ref 10–44)
ANION GAP SERPL CALC-SCNC: 12 MMOL/L (ref 8–16)
AST SERPL-CCNC: 25 U/L (ref 10–40)
B2 MICROGLOB SERPL-MCNC: 1.9 UG/ML (ref 0–2.5)
BASOPHILS # BLD AUTO: 0.07 K/UL (ref 0–0.2)
BASOPHILS NFR BLD: 1.4 % (ref 0–1.9)
BILIRUB SERPL-MCNC: 0.5 MG/DL (ref 0.1–1)
BUN SERPL-MCNC: 18 MG/DL (ref 8–23)
CALCIUM SERPL-MCNC: 10.2 MG/DL (ref 8.7–10.5)
CHLORIDE SERPL-SCNC: 100 MMOL/L (ref 95–110)
CO2 SERPL-SCNC: 26 MMOL/L (ref 23–29)
CREAT SERPL-MCNC: 1.1 MG/DL (ref 0.5–1.4)
DIFFERENTIAL METHOD: ABNORMAL
EOSINOPHIL # BLD AUTO: 0.2 K/UL (ref 0–0.5)
EOSINOPHIL NFR BLD: 3.6 % (ref 0–8)
ERYTHROCYTE [DISTWIDTH] IN BLOOD BY AUTOMATED COUNT: 12 % (ref 11.5–14.5)
EST. GFR  (NO RACE VARIABLE): 54.4 ML/MIN/1.73 M^2
GLUCOSE SERPL-MCNC: 95 MG/DL (ref 70–110)
HCT VFR BLD AUTO: 38 % (ref 37–48.5)
HGB BLD-MCNC: 12.6 G/DL (ref 12–16)
IMM GRANULOCYTES # BLD AUTO: 0.03 K/UL (ref 0–0.04)
IMM GRANULOCYTES NFR BLD AUTO: 0.6 % (ref 0–0.5)
LDH SERPL L TO P-CCNC: 183 U/L (ref 110–260)
LYMPHOCYTES # BLD AUTO: 1.6 K/UL (ref 1–4.8)
LYMPHOCYTES NFR BLD: 32.1 % (ref 18–48)
MCH RBC QN AUTO: 30.7 PG (ref 27–31)
MCHC RBC AUTO-ENTMCNC: 33.2 G/DL (ref 32–36)
MCV RBC AUTO: 93 FL (ref 82–98)
MONOCYTES # BLD AUTO: 0.4 K/UL (ref 0.3–1)
MONOCYTES NFR BLD: 7.6 % (ref 4–15)
NEUTROPHILS # BLD AUTO: 2.7 K/UL (ref 1.8–7.7)
NEUTROPHILS NFR BLD: 54.7 % (ref 38–73)
NRBC BLD-RTO: 0 /100 WBC
PLATELET # BLD AUTO: 248 K/UL (ref 150–450)
PMV BLD AUTO: 9.4 FL (ref 9.2–12.9)
POTASSIUM SERPL-SCNC: 3.5 MMOL/L (ref 3.5–5.1)
PROT SERPL-MCNC: 7.3 G/DL (ref 6–8.4)
RBC # BLD AUTO: 4.11 M/UL (ref 4–5.4)
SODIUM SERPL-SCNC: 138 MMOL/L (ref 136–145)
WBC # BLD AUTO: 4.99 K/UL (ref 3.9–12.7)

## 2023-09-22 PROCEDURE — 83615 LACTATE (LD) (LDH) ENZYME: CPT | Mod: PN | Performed by: NURSE PRACTITIONER

## 2023-09-22 PROCEDURE — 36415 COLL VENOUS BLD VENIPUNCTURE: CPT | Mod: PN | Performed by: NURSE PRACTITIONER

## 2023-09-22 PROCEDURE — 85025 COMPLETE CBC W/AUTO DIFF WBC: CPT | Mod: PN | Performed by: NURSE PRACTITIONER

## 2023-09-22 PROCEDURE — 82232 ASSAY OF BETA-2 PROTEIN: CPT | Performed by: NURSE PRACTITIONER

## 2023-09-22 PROCEDURE — 80053 COMPREHEN METABOLIC PANEL: CPT | Mod: PN | Performed by: NURSE PRACTITIONER

## 2023-09-27 ENCOUNTER — OFFICE VISIT (OUTPATIENT)
Dept: HEMATOLOGY/ONCOLOGY | Facility: CLINIC | Age: 68
End: 2023-09-27
Payer: MEDICARE

## 2023-09-27 VITALS
WEIGHT: 138.44 LBS | SYSTOLIC BLOOD PRESSURE: 107 MMHG | RESPIRATION RATE: 18 BRPM | HEIGHT: 63 IN | DIASTOLIC BLOOD PRESSURE: 71 MMHG | TEMPERATURE: 98 F | HEART RATE: 71 BPM | BODY MASS INDEX: 24.53 KG/M2 | OXYGEN SATURATION: 97 %

## 2023-09-27 DIAGNOSIS — Z85.72 HISTORY OF LYMPHOMA: Primary | ICD-10-CM

## 2023-09-27 PROCEDURE — 1126F AMNT PAIN NOTED NONE PRSNT: CPT | Mod: CPTII,S$GLB,, | Performed by: NURSE PRACTITIONER

## 2023-09-27 PROCEDURE — 3288F FALL RISK ASSESSMENT DOCD: CPT | Mod: CPTII,S$GLB,, | Performed by: NURSE PRACTITIONER

## 2023-09-27 PROCEDURE — 99999 PR PBB SHADOW E&M-EST. PATIENT-LVL IV: ICD-10-PCS | Mod: PBBFAC,,, | Performed by: NURSE PRACTITIONER

## 2023-09-27 PROCEDURE — 99999 PR PBB SHADOW E&M-EST. PATIENT-LVL IV: CPT | Mod: PBBFAC,,, | Performed by: NURSE PRACTITIONER

## 2023-09-27 PROCEDURE — 3008F PR BODY MASS INDEX (BMI) DOCUMENTED: ICD-10-PCS | Mod: CPTII,S$GLB,, | Performed by: NURSE PRACTITIONER

## 2023-09-27 PROCEDURE — 1159F PR MEDICATION LIST DOCUMENTED IN MEDICAL RECORD: ICD-10-PCS | Mod: CPTII,S$GLB,, | Performed by: NURSE PRACTITIONER

## 2023-09-27 PROCEDURE — 99214 OFFICE O/P EST MOD 30 MIN: CPT | Mod: S$GLB,,, | Performed by: NURSE PRACTITIONER

## 2023-09-27 PROCEDURE — 3074F SYST BP LT 130 MM HG: CPT | Mod: CPTII,S$GLB,, | Performed by: NURSE PRACTITIONER

## 2023-09-27 PROCEDURE — 1101F PT FALLS ASSESS-DOCD LE1/YR: CPT | Mod: CPTII,S$GLB,, | Performed by: NURSE PRACTITIONER

## 2023-09-27 PROCEDURE — 1160F RVW MEDS BY RX/DR IN RCRD: CPT | Mod: CPTII,S$GLB,, | Performed by: NURSE PRACTITIONER

## 2023-09-27 PROCEDURE — 1101F PR PT FALLS ASSESS DOC 0-1 FALLS W/OUT INJ PAST YR: ICD-10-PCS | Mod: CPTII,S$GLB,, | Performed by: NURSE PRACTITIONER

## 2023-09-27 PROCEDURE — 3288F PR FALLS RISK ASSESSMENT DOCUMENTED: ICD-10-PCS | Mod: CPTII,S$GLB,, | Performed by: NURSE PRACTITIONER

## 2023-09-27 PROCEDURE — 1126F PR PAIN SEVERITY QUANTIFIED, NO PAIN PRESENT: ICD-10-PCS | Mod: CPTII,S$GLB,, | Performed by: NURSE PRACTITIONER

## 2023-09-27 PROCEDURE — 3078F DIAST BP <80 MM HG: CPT | Mod: CPTII,S$GLB,, | Performed by: NURSE PRACTITIONER

## 2023-09-27 PROCEDURE — 1159F MED LIST DOCD IN RCRD: CPT | Mod: CPTII,S$GLB,, | Performed by: NURSE PRACTITIONER

## 2023-09-27 PROCEDURE — 4010F ACE/ARB THERAPY RXD/TAKEN: CPT | Mod: CPTII,S$GLB,, | Performed by: NURSE PRACTITIONER

## 2023-09-27 PROCEDURE — 3078F PR MOST RECENT DIASTOLIC BLOOD PRESSURE < 80 MM HG: ICD-10-PCS | Mod: CPTII,S$GLB,, | Performed by: NURSE PRACTITIONER

## 2023-09-27 PROCEDURE — 3074F PR MOST RECENT SYSTOLIC BLOOD PRESSURE < 130 MM HG: ICD-10-PCS | Mod: CPTII,S$GLB,, | Performed by: NURSE PRACTITIONER

## 2023-09-27 PROCEDURE — 3008F BODY MASS INDEX DOCD: CPT | Mod: CPTII,S$GLB,, | Performed by: NURSE PRACTITIONER

## 2023-09-27 PROCEDURE — 99214 PR OFFICE/OUTPT VISIT, EST, LEVL IV, 30-39 MIN: ICD-10-PCS | Mod: S$GLB,,, | Performed by: NURSE PRACTITIONER

## 2023-09-27 PROCEDURE — 1160F PR REVIEW ALL MEDS BY PRESCRIBER/CLIN PHARMACIST DOCUMENTED: ICD-10-PCS | Mod: CPTII,S$GLB,, | Performed by: NURSE PRACTITIONER

## 2023-09-27 PROCEDURE — 4010F PR ACE/ARB THEARPY RXD/TAKEN: ICD-10-PCS | Mod: CPTII,S$GLB,, | Performed by: NURSE PRACTITIONER

## 2023-09-27 NOTE — PROGRESS NOTES
CC:  Annual lymphoma surveillance    HISTORY OF PRESENT ILLNESS:    This is a 68-year-old  female known to Dr. Campos for Stage IIIA diffuse large B-cell non-Hodgkin lymphoma that presented with adenopathy in the left supraclavicular region.  She was treated with 8 cycles of CHOP/Rituxan and went into complete remission.      Seizure when changing medication from Lamictal to Keppra in February/22.  She has resumed Lamictal without incident.  TIA like in May/2022.  CT & MRI done; on ASA EC 81 mg daily.  Found to have a meningioma in the brain that has been stable (9 yrs old).    She presents to the clinic today for her 132-month (11 yr) post-therapy evaluation.    She remains active and well.  Fatigued with medication.  She denies any recurrent illness, fevers, chills, drenching night sweats, painful lymphadenopathy, unexplained weight loss, rashes, pruritus, abdominal discomfort/bloating, nausea, vomiting, constipation, diarrhea, irregular heartbeat, chest pain, bleeding, etc.   No other new complaints or pertinent findings on 14-point review of systems.    Past Medical History:   Diagnosis Date    Depression     anxiety and depression situational    Hypertension     Menopausal symptom     vaginal dryness and hot flashes    NHL (non-Hodgkin's lymphoma) 2013    s/p 8 cycles of CHOP/Rituxan    Osteoarthritis     both hands    Seasonal allergies     seasonal and animal    Seizures 2013    Started 11/2014 after treatment for NHL, MRI brain normal, Grand mal 10/2014 and 6/2014 started several months after chemo    Skin cancer 01/2020    TIA (transient ischemic attack) 05/09/2022    [facility]     Past Surgical History:   Procedure Laterality Date    BONE MARROW ASPIRATION      CARPAL TUNNEL RELEASE Right     CARPAL TUNNEL RELEASE      CATARACT EXTRACTION Bilateral 10/01/2022    oct left eye. nov right eye    CATARACT EXTRACTION, BILATERAL      COLONOSCOPY  09/15/2006    CATHLEEN.   One 2-3 mm polyp  in the hepatic flexure.  HYPERPLASTIC POLYP.  Small internal hemorrhoids.    COLONOSCOPY N/A 08/10/2016    Procedure: COLONOSCOPY;  Surgeon: Patel Foster Jr., MD;  Location: [facility];  Service: Endoscopy;  Laterality: N/A;    HYSTERECTOMY      total, including ovaries secondary to endometriosis, fibroids    LYMPH NODE BIOPSY  01/24/2012    Left cervical lymph node    OOPHORECTOMY      PORTACATH PLACEMENT      portacath removal  2015    SALIVARY GLAND SURGERY Right     removed for large stone    TONSILLECTOMY      VAGINAL DELIVERY      times 2     Current Outpatient Medications on File Prior to Visit   Medication Sig Dispense Refill    amoxicillin (AMOXIL) 875 MG tablet Take 1 tablet (875 mg total) by mouth 2 (two) times daily. (Patient not taking: Reported on 7/20/2023) 20 tablet 0    aspirin (ECOTRIN) 81 MG EC tablet Take 81 mg by mouth once daily.      celecoxib (CELEBREX) 200 MG capsule Take by mouth once daily.      fluticasone propionate (FLONASE) 50 mcg/actuation nasal spray 2 sprays (100 mcg total) by Each Nostril route once daily. 16 g 6    hydroCHLOROthiazide (HYDRODIURIL) 25 MG tablet Take 1 tablet (25 mg total) by mouth once daily. 90 tablet 3    lamotrigine XR (LAMICTAL XR) 200 mg TR24 XR tablet Take 2 tablets (400 mg total) by mouth once daily. 60 tablet 11    losartan (COZAAR) 50 MG tablet Take 1 tablet (50 mg total) by mouth once daily. 90 tablet 3    multivitamin with minerals tablet Take 1 tablet by mouth once daily.      potassium chloride SA (K-DUR,KLOR-CON) 20 MEQ tablet Take 1 tablet (20 mEq total) by mouth once daily. 90 tablet 2    rosuvastatin (CRESTOR) 20 MG tablet Take 1 tablet (20 mg total) by mouth once daily. 90 tablet 3    venlafaxine (EFFEXOR-XR) 150 MG Cp24 Take 1 capsule (150 mg total) by mouth once daily. 90 capsule 3    zonisamide (ZONEGRAN) 100 MG Cap Take 3 capsules (300 mg total) by mouth every evening. 90 capsule 5     No current facility-administered medications on file  prior to visit.       PHYSICAL EXAMINATION:  GENERAL:  Well-developed, well-nourished white female in no acute distress.  Alert and oriented x3.  VITAL SIGNS:  Weight:  Gain of 4 1/2 pounds in 1 year   Wt Readings from Last 3 Encounters:   07/20/23 62.5 kg (137 lb 12.6 oz)   06/12/23 59.2 kg (130 lb 8.2 oz)   05/29/23 62.3 kg (137 lb 5.6 oz)     Temp Readings from Last 3 Encounters:   06/12/23 98.2 °F (36.8 °C)   12/12/22 98.1 °F (36.7 °C) (Temporal)   09/27/22 98.3 °F (36.8 °C)     BP Readings from Last 3 Encounters:   07/20/23 124/71   06/12/23 124/80   05/29/23 128/63     Pulse Readings from Last 3 Encounters:   07/20/23 72   06/12/23 66   05/29/23 62     HEENT:  Normocephalic, atraumatic.  Oral mucosa pink and moist.  Lips without lesions.  Tongue midline.  Oropharynx clear.  Nonicteric sclerae.  NECK:  Supple, no adenopathy.  No carotid bruits, thyromegaly or thyroid nodule.  HEART:  Regular rate and rhythm without murmur, gallop or rub.  LUNGS:  Clear to auscultation bilaterally.  Normal respiratory effort.  ABDOMEN:  Soft, nontender, nondistended with positive normoactive bowel sounds, no hepatosplenomegaly.  EXTREMITIES:  No cyanosis, clubbing or edema.  Distal pulses are intact.  AXILLAE AND GROIN:  No palpable pathologic lymphadenopathy is appreciated.  SKIN:  Intact/turgor normal.  NEUROLOGIC:  Cranial nerves II-XII grossly intact.  Motor:  Good muscle bulk and tone.  Strength/sensory 5/5 throughout.  Gait stable.    LABORATORY:    Lab Results   Component Value Date    WBC 4.99 09/22/2023    RBC 4.11 09/22/2023    HGB 12.6 09/22/2023    HCT 38.0 09/22/2023    MCV 93 09/22/2023    MCH 30.7 09/22/2023    MCHC 33.2 09/22/2023    RDW 12.0 09/22/2023     09/22/2023    MPV 9.4 09/22/2023    GRAN 2.7 09/22/2023    GRAN 54.7 09/22/2023    LYMPH 1.6 09/22/2023    LYMPH 32.1 09/22/2023    MONO 0.4 09/22/2023    MONO 7.6 09/22/2023    EOS 0.2 09/22/2023    BASO 0.07 09/22/2023    EOSINOPHIL 3.6 09/22/2023     BASOPHIL 1.4 09/22/2023     CMP  Sodium   Date Value Ref Range Status   09/22/2023 138 136 - 145 mmol/L Final     Potassium   Date Value Ref Range Status   09/22/2023 3.5 3.5 - 5.1 mmol/L Final     Chloride   Date Value Ref Range Status   09/22/2023 100 95 - 110 mmol/L Final     CO2   Date Value Ref Range Status   09/22/2023 26 23 - 29 mmol/L Final     Glucose   Date Value Ref Range Status   09/22/2023 95 70 - 110 mg/dL Final     BUN   Date Value Ref Range Status   09/22/2023 18 8 - 23 mg/dL Final     Creatinine   Date Value Ref Range Status   09/22/2023 1.1 0.5 - 1.4 mg/dL Final     Calcium   Date Value Ref Range Status   09/22/2023 10.2 8.7 - 10.5 mg/dL Final     Total Protein   Date Value Ref Range Status   09/22/2023 7.3 6.0 - 8.4 g/dL Final     Albumin   Date Value Ref Range Status   09/22/2023 4.5 3.5 - 5.2 g/dL Final     Total Bilirubin   Date Value Ref Range Status   09/22/2023 0.5 0.1 - 1.0 mg/dL Final     Comment:     For infants and newborns, interpretation of results should be based  on gestational age, weight and in agreement with clinical  observations.    Premature Infant recommended reference ranges:  Up to 24 hours.............<8.0 mg/dL  Up to 48 hours............<12.0 mg/dL  3-5 days..................<15.0 mg/dL  6-29 days.................<15.0 mg/dL       Alkaline Phosphatase   Date Value Ref Range Status   09/22/2023 111 55 - 135 U/L Final     AST   Date Value Ref Range Status   09/22/2023 25 10 - 40 U/L Final     ALT   Date Value Ref Range Status   09/22/2023 21 10 - 44 U/L Final     Anion Gap   Date Value Ref Range Status   09/22/2023 12 8 - 16 mmol/L Final     eGFR if    Date Value Ref Range Status   06/06/2022 >60.0 >60 mL/min/1.73 m^2 Final     eGFR if non    Date Value Ref Range Status   06/06/2022 >60.0 >60 mL/min/1.73 m^2 Final     Comment:     Calculation used to obtain the estimated glomerular filtration  rate (eGFR) is the CKD-EPI equation.        LDH  183   [id number] 1.9    IMPRESSION:  1.  Stage IIIA diffuse large B-cell non-Hodgkin lymphoma - MUNIR @ 132 month post.  2.  Idiopathic seizure disorder - stable; follows with Neurology.  3.  Elevated liver function test - fatty liver, remains stable.  4.  Skin cancer - follows Dr. Mena   5.  Anemia - mild, asymptomatic       PLAN: Return in one year with interval CBC, CMP, LDH, and beta-2 microglobulin for annual surveillance (12 year post).    Assessment/plan reviewed and approved by Dr. Garcia.    Route Chart for Scheduling    Med Onc Chart Routing      Follow up with physician    Follow up with [facility]ULL 1 year. f/u in 1 year with CBC, CMP, LDH, BETA2 prior   Infusion scheduling note    Injection scheduling note    Labs    Imaging    Pharmacy appointment    Other referrals            30 minutes were spent in coordination of patient's care, record review and counseling.

## 2023-11-07 DIAGNOSIS — G40.909 SEIZURE DISORDER: ICD-10-CM

## 2023-11-07 RX ORDER — ZONISAMIDE 100 MG/1
CAPSULE ORAL
Qty: 270 CAPSULE | Refills: 1 | Status: SHIPPED | OUTPATIENT
Start: 2023-11-07

## 2023-11-13 ENCOUNTER — PATIENT MESSAGE (OUTPATIENT)
Dept: FAMILY MEDICINE | Facility: CLINIC | Age: 68
End: 2023-11-13
Payer: MEDICARE

## 2023-11-13 ENCOUNTER — TELEPHONE (OUTPATIENT)
Dept: FAMILY MEDICINE | Facility: CLINIC | Age: 68
End: 2023-11-13
Payer: MEDICARE

## 2023-11-13 ENCOUNTER — HOSPITAL ENCOUNTER (OUTPATIENT)
Dept: RADIOLOGY | Facility: HOSPITAL | Age: 68
Discharge: HOME | End: 2023-11-13
Attending: INTERNAL MEDICINE
Payer: MEDICARE

## 2023-11-13 DIAGNOSIS — Z78.0 ASYMPTOMATIC MENOPAUSAL STATE: ICD-10-CM

## 2023-11-13 PROCEDURE — 77080 DXA BONE DENSITY AXIAL: CPT | Mod: 26,,, | Performed by: RADIOLOGY

## 2023-11-13 PROCEDURE — 77080 DXA BONE DENSITY AXIAL SKELETON 1 OR MORE SITES: ICD-10-PCS | Mod: 26,,, | Performed by: RADIOLOGY

## 2023-11-13 PROCEDURE — 77080 DXA BONE DENSITY AXIAL: CPT | Mod: TC,PO

## 2023-11-13 NOTE — TELEPHONE ENCOUNTER
Spoke with pt - informed her of need for calcium, vit D and exercise - verbalized understanding. Dexa to be repeated in 2 years.

## 2023-11-13 NOTE — TELEPHONE ENCOUNTER
----- Message from Marisela Lockwood DO sent at 11/13/2023  4:30 PM CST -----  Please let the patient know that her bone density shows osteopenia or bone loss.  No need for treatment at his time but I recommend taking over the counter calcium and vitamin D.  I also recommend resistance training with weights or resistance bands.  We will recheck for progression in 2 years.      Thanks

## 2023-11-14 ENCOUNTER — TELEPHONE (OUTPATIENT)
Dept: FAMILY MEDICINE | Facility: CLINIC | Age: 68
End: 2023-11-14
Payer: MEDICARE

## 2023-11-14 NOTE — TELEPHONE ENCOUNTER
Spoke with pt - doesn't have any questions about the DEXA. Would like an appointment tomorrow for a poss sinus infection. - Scheduled for 11/15 @ 8:45am.

## 2023-11-14 NOTE — TELEPHONE ENCOUNTER
----- Message from Trevor Luong sent at 11/14/2023  2:30 PM CST -----  Type: Needs Medical Advice  Who Called:  pt   Best Call Back Number: [phone]    Additional Information: pt stated she has an additional questions regarding pt's results that were previously discussed yesterday please call back to advise asap thanks!

## 2023-11-15 ENCOUNTER — OFFICE VISIT (OUTPATIENT)
Dept: FAMILY MEDICINE | Facility: CLINIC | Age: 68
End: 2023-11-15
Payer: MEDICARE

## 2023-11-15 VITALS
BODY MASS INDEX: 25.01 KG/M2 | OXYGEN SATURATION: 95 % | HEIGHT: 63 IN | WEIGHT: 141.13 LBS | HEART RATE: 86 BPM | SYSTOLIC BLOOD PRESSURE: 110 MMHG | DIASTOLIC BLOOD PRESSURE: 60 MMHG

## 2023-11-15 DIAGNOSIS — J34.9 SINUS PROBLEM: ICD-10-CM

## 2023-11-15 DIAGNOSIS — J06.9 UPPER RESPIRATORY INFECTION WITH COUGH AND CONGESTION: Primary | ICD-10-CM

## 2023-11-15 LAB
CTP QC/QA: YES
CTP QC/QA: YES
POC MOLECULAR INFLUENZA A AGN: NEGATIVE
POC MOLECULAR INFLUENZA B AGN: NEGATIVE
SARS-COV-2 RDRP RESP QL NAA+PROBE: NEGATIVE

## 2023-11-15 PROCEDURE — 3078F PR MOST RECENT DIASTOLIC BLOOD PRESSURE < 80 MM HG: ICD-10-PCS | Mod: CPTII,S$GLB,, | Performed by: PHYSICIAN ASSISTANT

## 2023-11-15 PROCEDURE — 1126F PR PAIN SEVERITY QUANTIFIED, NO PAIN PRESENT: ICD-10-PCS | Mod: CPTII,S$GLB,, | Performed by: PHYSICIAN ASSISTANT

## 2023-11-15 PROCEDURE — 87635: ICD-10-PCS | Mod: QW,S$GLB,, | Performed by: PHYSICIAN ASSISTANT

## 2023-11-15 PROCEDURE — 87502 POCT INFLUENZA A/B MOLECULAR: ICD-10-PCS | Mod: QW,S$GLB,, | Performed by: PHYSICIAN ASSISTANT

## 2023-11-15 PROCEDURE — 99999 PR PBB SHADOW E&M-EST. PATIENT-LVL III: ICD-10-PCS | Mod: PBBFAC,,, | Performed by: PHYSICIAN ASSISTANT

## 2023-11-15 PROCEDURE — 3074F PR MOST RECENT SYSTOLIC BLOOD PRESSURE < 130 MM HG: ICD-10-PCS | Mod: CPTII,S$GLB,, | Performed by: PHYSICIAN ASSISTANT

## 2023-11-15 PROCEDURE — 3008F BODY MASS INDEX DOCD: CPT | Mod: CPTII,S$GLB,, | Performed by: PHYSICIAN ASSISTANT

## 2023-11-15 PROCEDURE — 4010F ACE/ARB THERAPY RXD/TAKEN: CPT | Mod: CPTII,S$GLB,, | Performed by: PHYSICIAN ASSISTANT

## 2023-11-15 PROCEDURE — 87635 SARS-COV-2 COVID-19 AMP PRB: CPT | Mod: QW,S$GLB,, | Performed by: PHYSICIAN ASSISTANT

## 2023-11-15 PROCEDURE — 4010F PR ACE/ARB THEARPY RXD/TAKEN: ICD-10-PCS | Mod: CPTII,S$GLB,, | Performed by: PHYSICIAN ASSISTANT

## 2023-11-15 PROCEDURE — 99213 PR OFFICE/OUTPT VISIT, EST, LEVL III, 20-29 MIN: ICD-10-PCS | Mod: S$GLB,,, | Performed by: PHYSICIAN ASSISTANT

## 2023-11-15 PROCEDURE — 87502 INFLUENZA DNA AMP PROBE: CPT | Mod: QW,S$GLB,, | Performed by: PHYSICIAN ASSISTANT

## 2023-11-15 PROCEDURE — 1101F PT FALLS ASSESS-DOCD LE1/YR: CPT | Mod: CPTII,S$GLB,, | Performed by: PHYSICIAN ASSISTANT

## 2023-11-15 PROCEDURE — 99999 PR PBB SHADOW E&M-EST. PATIENT-LVL III: CPT | Mod: PBBFAC,,, | Performed by: PHYSICIAN ASSISTANT

## 2023-11-15 PROCEDURE — 3008F PR BODY MASS INDEX (BMI) DOCUMENTED: ICD-10-PCS | Mod: CPTII,S$GLB,, | Performed by: PHYSICIAN ASSISTANT

## 2023-11-15 PROCEDURE — 3078F DIAST BP <80 MM HG: CPT | Mod: CPTII,S$GLB,, | Performed by: PHYSICIAN ASSISTANT

## 2023-11-15 PROCEDURE — 3288F PR FALLS RISK ASSESSMENT DOCUMENTED: ICD-10-PCS | Mod: CPTII,S$GLB,, | Performed by: PHYSICIAN ASSISTANT

## 2023-11-15 PROCEDURE — 1126F AMNT PAIN NOTED NONE PRSNT: CPT | Mod: CPTII,S$GLB,, | Performed by: PHYSICIAN ASSISTANT

## 2023-11-15 PROCEDURE — 3074F SYST BP LT 130 MM HG: CPT | Mod: CPTII,S$GLB,, | Performed by: PHYSICIAN ASSISTANT

## 2023-11-15 PROCEDURE — 99213 OFFICE O/P EST LOW 20 MIN: CPT | Mod: S$GLB,,, | Performed by: PHYSICIAN ASSISTANT

## 2023-11-15 PROCEDURE — 3288F FALL RISK ASSESSMENT DOCD: CPT | Mod: CPTII,S$GLB,, | Performed by: PHYSICIAN ASSISTANT

## 2023-11-15 PROCEDURE — 1101F PR PT FALLS ASSESS DOC 0-1 FALLS W/OUT INJ PAST YR: ICD-10-PCS | Mod: CPTII,S$GLB,, | Performed by: PHYSICIAN ASSISTANT

## 2023-11-15 NOTE — PROGRESS NOTES
"Subjective:      Patient ID: Keena Banks is a 68 y.o. female.    Chief Complaint: Sinus Problem    Patient is new to me.    HPI  Patient has PMH of seizure disorder, TIA, depression, HLD, history of lymphoma, meningioma, and NAFLD.    Patient reports headaches, congestion, and cough for two days.  Using unknown allergy tablet and flonase with some relief.  Sick contacts.  Not vaccinated against flu.  Denies fever, chest pain, or shortness of breath.    Review of Systems   Constitutional:  Positive for fatigue (baseline). Negative for appetite change, chills and fever.   HENT:  Positive for congestion, postnasal drip and voice change. Negative for ear pain and sore throat.    Eyes:  Negative for pain.   Respiratory:  Positive for cough. Negative for shortness of breath.    Cardiovascular:  Negative for chest pain.   Gastrointestinal:  Negative for abdominal pain, blood in stool, constipation, diarrhea, nausea and vomiting.   Musculoskeletal:  Negative for myalgias.   Neurological:  Positive for headaches.       Objective:   /60   Pulse 86   Ht 5' 3" (1.6 m)   Wt 64 kg (141 lb 1.5 oz)   SpO2 95%   BMI 24.99 kg/m²     Physical Exam  Vitals reviewed.   Constitutional:       Appearance: Normal appearance. She is well-developed.   HENT:      Head: Normocephalic and atraumatic.      Right Ear: Hearing, tympanic membrane, ear canal and external ear normal.      Left Ear: Hearing, tympanic membrane, ear canal and external ear normal.      Nose: Nose normal.      Right Sinus: No maxillary sinus tenderness or frontal sinus tenderness.      Left Sinus: No maxillary sinus tenderness or frontal sinus tenderness.      Mouth/Throat:      Lips: Pink.      Mouth: Mucous membranes are moist.      Pharynx: Oropharynx is clear.   Eyes:      General: Lids are normal.      Conjunctiva/sclera: Conjunctivae normal.   Cardiovascular:      Rate and Rhythm: Normal rate and regular rhythm.      Heart sounds: Normal heart sounds. No " murmur heard.     No friction rub. No gallop.   Pulmonary:      Effort: Pulmonary effort is normal. No respiratory distress.      Breath sounds: Normal breath sounds. No wheezing, rhonchi or rales.   Musculoskeletal:         General: Normal range of motion.   Lymphadenopathy:      Cervical: No cervical adenopathy.   Skin:     General: Skin is warm and dry.      Findings: No rash.   Neurological:      General: No focal deficit present.      Mental Status: She is alert and oriented to person, place, and time.   Psychiatric:         Mood and Affect: Mood normal.         Behavior: Behavior normal. Behavior is cooperative.         Judgment: Judgment normal.       Assessment:      1. Upper respiratory infection with cough and congestion    2. Sinus problem       Plan:   1. Upper respiratory infection with cough and congestion  Advised supportive care.  Will treat with antibiotics on Monday if continues.    2. Sinus problem  - POCT COVID-19 Rapid Screening  - POCT Influenza A/B Molecular    Follow up as needed.  Patient agreed with plan and expressed understanding.    Thank you for allowing me to serve you,

## 2023-12-07 ENCOUNTER — TELEPHONE (OUTPATIENT)
Dept: FAMILY MEDICINE | Facility: CLINIC | Age: 68
End: 2023-12-07
Payer: MEDICARE

## 2023-12-07 DIAGNOSIS — Z79.899 MEDICATION MANAGEMENT: ICD-10-CM

## 2023-12-07 DIAGNOSIS — I10 PRIMARY HYPERTENSION: Primary | ICD-10-CM

## 2023-12-07 NOTE — TELEPHONE ENCOUNTER
LVM with clinic number to return call if she needs assistance with scheduling labs and gave scheduling line number.

## 2023-12-07 NOTE — TELEPHONE ENCOUNTER
----- Message from Srinivasa Reinoso sent at 12/7/2023  8:31 AM CST -----  Regarding: advice  Contact: ERIKA PEREZ [[id number]]  Type: Needs Medical Advice  Who Called:  Gerald Shane    Symptoms (please be specific):  na    How long has patient had these symptoms:  na    Pharmacy name and phone #:  na    Best Call Back Number: [phone]    Additional Information: Needing lab orders put in Epic. Please call to advise.

## 2023-12-08 ENCOUNTER — TELEPHONE (OUTPATIENT)
Dept: FAMILY MEDICINE | Facility: CLINIC | Age: 68
End: 2023-12-08
Payer: MEDICARE

## 2023-12-08 RX ORDER — POTASSIUM CHLORIDE 20 MEQ/1
TABLET, EXTENDED RELEASE ORAL
Qty: 180 TABLET | Refills: 1 | Status: SHIPPED | OUTPATIENT
Start: 2023-12-08 | End: 2024-01-08

## 2023-12-08 NOTE — TELEPHONE ENCOUNTER
Please let her know that her labs look good and we will discuss at her upcoming appt but her potasium is low at 3.     She needs to take KCL 20 meq 2 pills bid for 3 days then 1 pill twice a day.   We will repeat her potasium level at her appt    Thanks

## 2023-12-08 NOTE — TELEPHONE ENCOUNTER
Spoke with pt about potassium level and change in taking her potassium pills.  States she has not taken her potassium in a week due to them being big. Gave pt instructions on how Dr. Lockwood would like her to start taking - verbalized understanding.

## 2023-12-13 ENCOUNTER — OFFICE VISIT (OUTPATIENT)
Dept: FAMILY MEDICINE | Facility: CLINIC | Age: 68
End: 2023-12-13
Payer: MEDICARE

## 2023-12-13 VITALS
SYSTOLIC BLOOD PRESSURE: 126 MMHG | HEIGHT: 63 IN | BODY MASS INDEX: 25.24 KG/M2 | OXYGEN SATURATION: 96 % | HEART RATE: 90 BPM | TEMPERATURE: 99 F | DIASTOLIC BLOOD PRESSURE: 70 MMHG | WEIGHT: 142.44 LBS

## 2023-12-13 DIAGNOSIS — I10 PRIMARY HYPERTENSION: ICD-10-CM

## 2023-12-13 DIAGNOSIS — G40.909 SEIZURE DISORDER: ICD-10-CM

## 2023-12-13 DIAGNOSIS — Z85.72 HISTORY OF LYMPHOMA: ICD-10-CM

## 2023-12-13 DIAGNOSIS — I25.10 CORONARY ARTERY DISEASE INVOLVING NATIVE CORONARY ARTERY OF NATIVE HEART WITHOUT ANGINA PECTORIS: Primary | ICD-10-CM

## 2023-12-13 DIAGNOSIS — D32.9 MENINGIOMA: ICD-10-CM

## 2023-12-13 DIAGNOSIS — E78.5 HYPERLIPIDEMIA, UNSPECIFIED HYPERLIPIDEMIA TYPE: ICD-10-CM

## 2023-12-13 DIAGNOSIS — F33.0 MAJOR DEPRESSIVE DISORDER, RECURRENT, MILD: ICD-10-CM

## 2023-12-13 DIAGNOSIS — K76.0 NAFLD (NONALCOHOLIC FATTY LIVER DISEASE): ICD-10-CM

## 2023-12-13 DIAGNOSIS — F41.1 GAD (GENERALIZED ANXIETY DISORDER): ICD-10-CM

## 2023-12-13 DIAGNOSIS — Z12.11 SCREEN FOR COLON CANCER: ICD-10-CM

## 2023-12-13 DIAGNOSIS — D64.9 ANEMIA, UNSPECIFIED TYPE: ICD-10-CM

## 2023-12-13 DIAGNOSIS — J01.00 ACUTE NON-RECURRENT MAXILLARY SINUSITIS: ICD-10-CM

## 2023-12-13 DIAGNOSIS — J30.9 CHRONIC ALLERGIC RHINITIS: ICD-10-CM

## 2023-12-13 DIAGNOSIS — M19.049 HAND ARTHRITIS: ICD-10-CM

## 2023-12-13 DIAGNOSIS — Z23 NEED FOR VACCINATION: ICD-10-CM

## 2023-12-13 DIAGNOSIS — Z86.73 HISTORY OF TIA (TRANSIENT ISCHEMIC ATTACK): ICD-10-CM

## 2023-12-13 LAB
ANION GAP SERPL CALC-SCNC: 10 MMOL/L (ref 8–16)
BUN SERPL-MCNC: 12 MG/DL (ref 8–23)
CALCIUM SERPL-MCNC: 10.2 MG/DL (ref 8.7–10.5)
CHLORIDE SERPL-SCNC: 100 MMOL/L (ref 95–110)
CO2 SERPL-SCNC: 25 MMOL/L (ref 23–29)
CREAT SERPL-MCNC: 0.7 MG/DL (ref 0.5–1.4)
EST. GFR  (NO RACE VARIABLE): >60 ML/MIN/1.73 M^2
GLUCOSE SERPL-MCNC: 99 MG/DL (ref 70–110)
POTASSIUM SERPL-SCNC: 3.9 MMOL/L (ref 3.5–5.1)
SODIUM SERPL-SCNC: 135 MMOL/L (ref 136–145)

## 2023-12-13 PROCEDURE — 1160F RVW MEDS BY RX/DR IN RCRD: CPT | Mod: CPTII,S$GLB,, | Performed by: INTERNAL MEDICINE

## 2023-12-13 PROCEDURE — 3044F HG A1C LEVEL LT 7.0%: CPT | Mod: CPTII,S$GLB,, | Performed by: INTERNAL MEDICINE

## 2023-12-13 PROCEDURE — 4010F ACE/ARB THERAPY RXD/TAKEN: CPT | Mod: CPTII,S$GLB,, | Performed by: INTERNAL MEDICINE

## 2023-12-13 PROCEDURE — 90694 VACC AIIV4 NO PRSRV 0.5ML IM: CPT | Mod: S$GLB,,, | Performed by: INTERNAL MEDICINE

## 2023-12-13 PROCEDURE — 1101F PR PT FALLS ASSESS DOC 0-1 FALLS W/OUT INJ PAST YR: ICD-10-PCS | Mod: CPTII,S$GLB,, | Performed by: INTERNAL MEDICINE

## 2023-12-13 PROCEDURE — 1159F PR MEDICATION LIST DOCUMENTED IN MEDICAL RECORD: ICD-10-PCS | Mod: CPTII,S$GLB,, | Performed by: INTERNAL MEDICINE

## 2023-12-13 PROCEDURE — 1159F MED LIST DOCD IN RCRD: CPT | Mod: CPTII,S$GLB,, | Performed by: INTERNAL MEDICINE

## 2023-12-13 PROCEDURE — 3078F PR MOST RECENT DIASTOLIC BLOOD PRESSURE < 80 MM HG: ICD-10-PCS | Mod: CPTII,S$GLB,, | Performed by: INTERNAL MEDICINE

## 2023-12-13 PROCEDURE — 99214 OFFICE O/P EST MOD 30 MIN: CPT | Mod: S$GLB,,, | Performed by: INTERNAL MEDICINE

## 2023-12-13 PROCEDURE — 3074F SYST BP LT 130 MM HG: CPT | Mod: CPTII,S$GLB,, | Performed by: INTERNAL MEDICINE

## 2023-12-13 PROCEDURE — 1126F PR PAIN SEVERITY QUANTIFIED, NO PAIN PRESENT: ICD-10-PCS | Mod: CPTII,S$GLB,, | Performed by: INTERNAL MEDICINE

## 2023-12-13 PROCEDURE — G0008 ADMIN INFLUENZA VIRUS VAC: HCPCS | Mod: S$GLB,,, | Performed by: INTERNAL MEDICINE

## 2023-12-13 PROCEDURE — 1101F PT FALLS ASSESS-DOCD LE1/YR: CPT | Mod: CPTII,S$GLB,, | Performed by: INTERNAL MEDICINE

## 2023-12-13 PROCEDURE — 3074F PR MOST RECENT SYSTOLIC BLOOD PRESSURE < 130 MM HG: ICD-10-PCS | Mod: CPTII,S$GLB,, | Performed by: INTERNAL MEDICINE

## 2023-12-13 PROCEDURE — 99214 PR OFFICE/OUTPT VISIT, EST, LEVL IV, 30-39 MIN: ICD-10-PCS | Mod: S$GLB,,, | Performed by: INTERNAL MEDICINE

## 2023-12-13 PROCEDURE — 3008F PR BODY MASS INDEX (BMI) DOCUMENTED: ICD-10-PCS | Mod: CPTII,S$GLB,, | Performed by: INTERNAL MEDICINE

## 2023-12-13 PROCEDURE — 3078F DIAST BP <80 MM HG: CPT | Mod: CPTII,S$GLB,, | Performed by: INTERNAL MEDICINE

## 2023-12-13 PROCEDURE — 4010F PR ACE/ARB THEARPY RXD/TAKEN: ICD-10-PCS | Mod: CPTII,S$GLB,, | Performed by: INTERNAL MEDICINE

## 2023-12-13 PROCEDURE — 1126F AMNT PAIN NOTED NONE PRSNT: CPT | Mod: CPTII,S$GLB,, | Performed by: INTERNAL MEDICINE

## 2023-12-13 PROCEDURE — 3288F FALL RISK ASSESSMENT DOCD: CPT | Mod: CPTII,S$GLB,, | Performed by: INTERNAL MEDICINE

## 2023-12-13 PROCEDURE — 3288F PR FALLS RISK ASSESSMENT DOCUMENTED: ICD-10-PCS | Mod: CPTII,S$GLB,, | Performed by: INTERNAL MEDICINE

## 2023-12-13 PROCEDURE — 3008F BODY MASS INDEX DOCD: CPT | Mod: CPTII,S$GLB,, | Performed by: INTERNAL MEDICINE

## 2023-12-13 PROCEDURE — G0008 FLU VACCINE - QUADRIVALENT - ADJUVANTED: ICD-10-PCS | Mod: S$GLB,,, | Performed by: INTERNAL MEDICINE

## 2023-12-13 PROCEDURE — 80048 BASIC METABOLIC PNL TOTAL CA: CPT | Performed by: INTERNAL MEDICINE

## 2023-12-13 PROCEDURE — 3044F PR MOST RECENT HEMOGLOBIN A1C LEVEL <7.0%: ICD-10-PCS | Mod: CPTII,S$GLB,, | Performed by: INTERNAL MEDICINE

## 2023-12-13 PROCEDURE — 1160F PR REVIEW ALL MEDS BY PRESCRIBER/CLIN PHARMACIST DOCUMENTED: ICD-10-PCS | Mod: CPTII,S$GLB,, | Performed by: INTERNAL MEDICINE

## 2023-12-13 PROCEDURE — 90694 FLU VACCINE - QUADRIVALENT - ADJUVANTED: ICD-10-PCS | Mod: S$GLB,,, | Performed by: INTERNAL MEDICINE

## 2023-12-13 RX ORDER — AMOXICILLIN AND CLAVULANATE POTASSIUM 875; 125 MG/1; MG/1
1 TABLET, FILM COATED ORAL 2 TIMES DAILY
Qty: 20 TABLET | Refills: 0 | Status: SHIPPED | OUTPATIENT
Start: 2023-12-13 | End: 2024-01-08

## 2023-12-13 RX ORDER — VENLAFAXINE HYDROCHLORIDE 75 MG/1
75 CAPSULE, EXTENDED RELEASE ORAL DAILY
Qty: 14 CAPSULE | Refills: 0 | Status: SHIPPED | OUTPATIENT
Start: 2023-12-13 | End: 2024-01-08

## 2023-12-13 RX ORDER — ESCITALOPRAM OXALATE 10 MG/1
TABLET ORAL
Qty: 60 TABLET | Refills: 1 | Status: SHIPPED | OUTPATIENT
Start: 2023-12-13 | End: 2024-01-08

## 2023-12-13 NOTE — PATIENT INSTRUCTIONS
Decrease effexor to 75 mg daily for 2 weeks then stop    Start lexapro 10 mg daily for 1 week and then increase to 2 pills (20 mg) daily.

## 2023-12-13 NOTE — PROGRESS NOTES
Subjective:       Patient ID: Keena Banks is a 68 y.o. female.    Medication List with Changes/Refills   New Medications    AMOXICILLIN-CLAVULANATE 875-125MG (AUGMENTIN) 875-125 MG PER TABLET    Take 1 tablet by mouth 2 (two) times daily.    ESCITALOPRAM OXALATE (LEXAPRO) 10 MG TABLET    Take 1 tablet (10 mg total) by mouth once daily for 7 days, THEN 2 tablets (20 mg total) once daily.    VENLAFAXINE (EFFEXOR XR) 75 MG 24 HR CAPSULE    Take 1 capsule (75 mg total) by mouth once daily. for 14 days   Current Medications    ASPIRIN (ECOTRIN) 81 MG EC TABLET    Take 81 mg by mouth once daily.    CELECOXIB (CELEBREX) 200 MG CAPSULE    Take by mouth once daily.    FLUTICASONE PROPIONATE (FLONASE) 50 MCG/ACTUATION NASAL SPRAY    2 sprays (100 mcg total) by Each Nostril route once daily.    HYDROCHLOROTHIAZIDE (HYDRODIURIL) 25 MG TABLET    Take 1 tablet (25 mg total) by mouth once daily.    LAMOTRIGINE XR (LAMICTAL XR) 200 MG TR24 XR TABLET    Take 2 tablets (400 mg total) by mouth once daily.    LOSARTAN (COZAAR) 50 MG TABLET    Take 1 tablet (50 mg total) by mouth once daily.    MULTIVITAMIN WITH MINERALS TABLET    Take 1 tablet by mouth once daily.    POTASSIUM CHLORIDE SA (K-DUR,KLOR-CON) 20 MEQ TABLET    Take 1 tablet (20 mEq total) by mouth once daily.    POTASSIUM CHLORIDE SA (K-DUR,KLOR-CON) 20 MEQ TABLET    Take 2 pills bid for 3 days then 1 pill twice a day    ROSUVASTATIN (CRESTOR) 20 MG TABLET    Take 1 tablet (20 mg total) by mouth once daily.    ZONISAMIDE (ZONEGRAN) 100 MG CAP    TAKE 3 CAPSULES BY MOUTH EVERY EVENING   Discontinued Medications    AMOXICILLIN (AMOXIL) 875 MG TABLET    Take 1 tablet (875 mg total) by mouth 2 (two) times daily.    VENLAFAXINE (EFFEXOR-XR) 150 MG CP24    Take 1 capsule (150 mg total) by mouth once daily.    VENLAFAXINE (EFFEXOR-XR) 150 MG CP24    Take 1 capsule (150 mg total) by mouth once daily.       Chief Complaint: Follow-up  She is here today to f/u on her chronic medical  issues.        She has hypertension and is taking HCTZ 25 mg qday and losartan 50 mg qday. She denies chest pain or shortness of breath. She had a workup with cardiology with an echo (EF 45% per patient) and cardiac cath that per patient showed non obstructive disease with 30% blockage in one vessel.     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 Her potassium on 12/2023 was 3.0 and she completed replacement (KCL 40 meq bid for 3 days). She is due to recheck level today.      She has hyperlipidemia controlled on crestor 20 mg qday. Her lipids on 6/2023 were 166/76/86/64. She is taking aspirin daily.      She does report intermittent sinus congestion with PND that has worsened. She is coughing up thick tenacious yellow mucous. No wheezing or increase work of breathing. This worsened since her viral infection 3 weeks ago. She is using flonase and astelin without relief.      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 She is taking lamictal  mg qday and zonegran 100 mg in am and 200 mg qhs.  Zonegran was added after a breakthrough seizure in 1/2023. EEG on 1/2023 was normal. MRI on 1/2023 showed a stable small left parietal meningioma unchanged and mildly expanded empty sella.  She is followed by neurology and was seen on 7/2023.  No changes were made. She is tolerating this regimen with only atigue and some worsening depression symptoms.      She has a meningioma seen on MRI on 5/2022. She denies any headaches, nausea or vision changes. She was seen by neurosurgery on 6/2022 and advised to repeat imaging in one year and then f/u. Repeat MRI on 1/2023 was unchanged.      She had an ultrasound showing fatty infiltration of her liver. She has normal LFTs on 12/2023.        She had depression with anxiety and is taking venlafaxine 150 mg daily. She feels her depression is not  controlled. In the past she took an addition 75 mg daily dose but she did not feel this made much difference in her symptoms. She still complains of low motivation and fatigue. She will avoid seeing people and leaving the house. She denies any anxiety symptoms.No suicidal ideations.      She has a history of NHL(stage III A diffuse large B cell lymphoma) s/p 8 cycles of chemotherapy with CHOP/rituxan. She was  seen by oncology annual and her last appt was on 9/2023.  She continues to be in remission and will f/u in one year.     She has continues pain and arthritis in her hands. She was seen by rheumatology on 7/2019 and dx with erosive ostearthritis.   She reports increasing pain especially in the middle finger of her right hand. Her DIP joints remain swollen and painful. Xray of her hands on 11/2020 showed advanced degenerative change in the interphalangeal joints of both hands, mostly involving the DIP joints but also the PIP joints at several sites.  This has progressed involving the left 2nd DIP joint and right 3rd DIP joint.  She takes celebrex as needed with CBD oil.       She lives with her  and granddaughter (plays golf). She exercises regularly.  She tries to eat healthy. She denies any balance issues  and recent falls.       Colonoscopy---8/2016 repeat in 8 years  Mammogram---7/2023 negative  DEXA-----11/2023 osteopenia with fracture risk  of 6.7%, hip fracture risk of 0.3% (Tv -1.3, Tf -0.2)  Pap-----once since ANDREW neg  Tdap---5/2019  Influenza vaccine----12/2022---due   Pneumovax 23----11/2015  Prevnar 13----12/2016  Shingrex--- 6/2018 , 9/2020   Covid vaccine---3 doses       Review of Systems   Constitutional:  Negative for appetite change, fatigue, fever and unexpected weight change.   HENT:  Positive for postnasal drip. Negative for congestion, ear pain, hearing loss, sore throat and trouble swallowing.    Eyes:  Negative for pain and visual disturbance.   Respiratory:  Positive for cough.  "Negative for chest tightness, shortness of breath and wheezing.    Cardiovascular:  Negative for chest pain, palpitations and leg swelling.   Gastrointestinal:  Negative for abdominal pain, blood in stool, constipation, diarrhea, nausea and vomiting.   Endocrine: Negative for polyuria.   Genitourinary:  Negative for dysuria and hematuria.   Musculoskeletal:  Positive for arthralgias. Negative for back pain and myalgias.   Skin:  Negative for rash.   Neurological:  Negative for dizziness, weakness, numbness and headaches.   Hematological:  Does not bruise/bleed easily.   Psychiatric/Behavioral:  Positive for dysphoric mood. Negative for sleep disturbance and suicidal ideas. The patient is not nervous/anxious.        Objective:      Vitals:    12/13/23 0918   BP: 126/70   BP Location: Right arm   Patient Position: Sitting   BP Method: Medium (Manual)   Pulse: 90   Temp: 98.8 °F (37.1 °C)   SpO2: 96%   Weight: 64.6 kg (142 lb 6.7 oz)   Height: 5' 3" (1.6 m)     Body mass index is 25.23 kg/m².  Physical Exam    General appearance: No acute distress, cooperative  Eyes: PERRL, EOMI, conjunctiva clear  Ears: normal external ear and pinna, tm clear without drainage, canals clear  Nose: erythematous mucosa without drainage  Throat: no exudates or erythema, tonsils not enlarged, thick drip in throat   Mouth: no sores or lesions, moist mucous membranes  Neck: FROM, soft, supple, no thyromegaly, no bruits  Lymph: no anterior or posterior cervical adenopathy  Heart::  Regular rate and rhythm, no murmur  Lung: Clear to ascultation bilaterally, no wheezing, no rales, no rhonchi, no distress  Abdomen: Soft, nontender, no distention, no hepatosplenomegaly, bowel sounds normal, no guarding, no rebound, no peritoneal signs  Skin: no rashes, no lesions  Extremities: no edema, no cyanosis  Neuro: CN 2-12 intact, 5/5 muscle strength upper and lower extremity bilaterally, 2+ DTRs UE and LE bilaterally, normal gait  Peripheral pulses: 2+ " pedal pulses bilaterally, good perfusion and color  Musculoskeletal: FROM, good strenth, no tenderness  Joint: normal appearance, no swelling, no warmth, deformity of hands especially the DIP joints bilaterally     Assessment:       1. Coronary artery disease involving native coronary artery of native heart without angina pectoris    2. Primary hypertension    3. Hyperlipidemia, unspecified hyperlipidemia type    4. History of TIA (transient ischemic attack)    5. Chronic allergic rhinitis    6. Acute non-recurrent maxillary sinusitis    7. NAFLD (nonalcoholic fatty liver disease)    8. Meningioma    9. Seizure disorder    10. Anemia, unspecified type    11. Major depressive disorder, recurrent, mild    12. SORAYA (generalized anxiety disorder)    13. History of lymphoma    14. Hand arthritis    15. Need for vaccination    16. Screen for colon cancer        Plan:       Coronary artery disease involving native coronary artery of native heart without angina pectoris  Stable and no active symptoms.    Primary hypertension  Well controlled and continue current regimen. Will recheck potassium level today. She continues on KCL 20 meq daily but pill is large and she does not like to take so consider in the future stopping HCTZ and increasing her dose of losartan. Will discuss at her f/u in 3 weeks.   -     BASIC METABOLIC PANEL  -     CBC Auto Differential; Future; Expected date: 12/13/2023  -     Comprehensive Metabolic Panel; Future; Expected date: 12/13/2023  -     Lipid Panel; Future; Expected date: 12/13/2023  -     TSH; Future; Expected date: 12/13/2023    Hyperlipidemia, unspecified hyperlipidemia type  Good control on this dose of crestor    History of TIA (transient ischemic attack)  Continue statin and aspirin    Chronic allergic rhinitis  Well controlled and continue current regimen.     Acute non-recurrent maxillary sinusitis  Uncontrolled with prolonged symptoms. Will start treatment with antibiotics.   -      amoxicillin-clavulanate 875-125mg (AUGMENTIN) 875-125 mg per tablet; Take 1 tablet by mouth 2 (two) times daily.  Dispense: 20 tablet; Refill: 0    NAFLD (nonalcoholic fatty liver disease)  Stable and continue to monitor LFTs    Meningioma  Stable on MRI on 1/2023.     Seizure disorder  No recurrence since taking lamictal with zonegran. She continues to follow with neurology    Anemia, unspecified type  Stable and mild. Continue to monitor    Major depressive disorder, recurrent, mild  Uncontrolled and after much discussion decided to switch her effexor to lexapro. Will decrease effexor to 75 mg bid for 2 weeks then stop. Will start now lexapro 10 mg daily for one week then increase to 20 mg daily. She will f/u with me in 3 weeks to recheck.   -     EScitalopram oxalate (LEXAPRO) 10 MG tablet; Take 1 tablet (10 mg total) by mouth once daily for 7 days, THEN 2 tablets (20 mg total) once daily.  Dispense: 60 tablet; Refill: 1  -     venlafaxine (EFFEXOR XR) 75 MG 24 hr capsule; Take 1 capsule (75 mg total) by mouth once daily. for 14 days  Dispense: 14 capsule; Refill: 0    SORAYA (generalized anxiety disorder)  Stable    History of lymphoma  No recurrence and continue to follow with oncology annually    Hand arthritis  Stable on celebrex for pain    Need for vaccination  -     Influenza - Quadrivalent (Adjuvanted)    Screen for colon cancer  -     Case Request Endoscopy: COLONOSCOPY    Follow up in about 6 months (around 6/13/2024) for chronic medical issues and 3 weeks to recheck depression .  Also to discuss possibly stopping HCTZ due to chronic hypokalemia.

## 2023-12-14 ENCOUNTER — TELEPHONE (OUTPATIENT)
Dept: FAMILY MEDICINE | Facility: CLINIC | Age: 68
End: 2023-12-14
Payer: MEDICARE

## 2023-12-14 NOTE — TELEPHONE ENCOUNTER
Spoke with pt - pharmacy told her last night they didn't have the rx for Augmentin. She called them again this morning after leaving a message for us and they now have the prescription and are getting it ready to fill.

## 2023-12-14 NOTE — TELEPHONE ENCOUNTER
----- Message from Loree Ni, Patient Care Assistant sent at 12/14/2023  8:20 AM CST -----  Regarding: medication  Contact: pt  Type: Needs Medical Advice    Who Called:  pt     Pharmacy name and phone #:    [id number] - [place] - [address]  [address]  [place] [address]  Phone: [phone] Fax: [phone]    Best Call Back Number: [phone] (home)     Additional Information: pt states she would like a callback regarding amoxicillin-clavulanate 875-125mg (AUGMENTIN) 875-125 mg per tablet. Please call pt to advise. Thanks!

## 2023-12-15 ENCOUNTER — TELEPHONE (OUTPATIENT)
Dept: GASTROENTEROLOGY | Facility: CLINIC | Age: 68
End: 2023-12-15
Payer: MEDICARE

## 2023-12-29 ENCOUNTER — TELEPHONE (OUTPATIENT)
Dept: NEUROLOGY | Facility: CLINIC | Age: 68
End: 2023-12-29
Payer: MEDICARE

## 2023-12-29 DIAGNOSIS — G40.909 SEIZURE DISORDER: Primary | ICD-10-CM

## 2023-12-29 NOTE — TELEPHONE ENCOUNTER
----- Message from Trini Perry sent at 12/29/2023  8:05 AM CST -----  Contact: Self  Type: Needs Medical Advice  Who Called:  Patient  Symptoms (please be specific):  Pt believes she had 2 TIA's   How long has patient had these symptoms:  a few days ago  Pharmacy name and phone #:  n/a  Best Call Back Number: [phone]  Additional Information: Pt is calling to see if she needs to be seen right away for this or does she just want to call her to discuss. Says she is ok now, but just wanted to let her know. Thank You.

## 2023-12-29 NOTE — TELEPHONE ENCOUNTER
Pt called stating that she had 2 episodes on 12/26/23.  She was opening presents with her family, she was actively unwrapping a present when she stopped . She stared forward and smiled at her .  She could not verbally respond to his questions. It lasted 1-2 minutes. He state's, she was a little lethargic afterwards.   About 15-20 minutes later they were in the kitchen and she was washing dishes.  He was asking her questions, but she could not respond verbally, but she did smile at him.  She continued to wash the dishes.  The second episode again lasted a minute or two.  She also stated when she woke up on 12/26 she was incontinent of urine.  She stated, that had not happen to her before.  No missed medications, no s/s of infection.  Stress level and sleeping habits are good.

## 2023-12-29 NOTE — TELEPHONE ENCOUNTER
Spoke to the pt, appt scheduled 1/3/24 at 1140 with Dr. Graham for seizures.  Pt to get trough levels drawn on 1/2/24.  She will do Lamictal in the AM and Zonegran in the PM.
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
## 2024-01-03 ENCOUNTER — OFFICE VISIT (OUTPATIENT)
Dept: NEUROLOGY | Facility: CLINIC | Age: 69
End: 2024-01-03
Payer: MEDICARE

## 2024-01-03 VITALS
DIASTOLIC BLOOD PRESSURE: 71 MMHG | BODY MASS INDEX: 24.75 KG/M2 | WEIGHT: 139.69 LBS | SYSTOLIC BLOOD PRESSURE: 130 MMHG | RESPIRATION RATE: 17 BRPM | HEIGHT: 63 IN | HEART RATE: 75 BPM

## 2024-01-03 DIAGNOSIS — G40.909 SEIZURE DISORDER: ICD-10-CM

## 2024-01-03 PROCEDURE — 3288F FALL RISK ASSESSMENT DOCD: CPT | Mod: CPTII,S$GLB,, | Performed by: STUDENT IN AN ORGANIZED HEALTH CARE EDUCATION/TRAINING PROGRAM

## 2024-01-03 PROCEDURE — 3075F SYST BP GE 130 - 139MM HG: CPT | Mod: CPTII,S$GLB,, | Performed by: STUDENT IN AN ORGANIZED HEALTH CARE EDUCATION/TRAINING PROGRAM

## 2024-01-03 PROCEDURE — 99214 OFFICE O/P EST MOD 30 MIN: CPT | Mod: S$GLB,,, | Performed by: STUDENT IN AN ORGANIZED HEALTH CARE EDUCATION/TRAINING PROGRAM

## 2024-01-03 PROCEDURE — 1101F PT FALLS ASSESS-DOCD LE1/YR: CPT | Mod: CPTII,S$GLB,, | Performed by: STUDENT IN AN ORGANIZED HEALTH CARE EDUCATION/TRAINING PROGRAM

## 2024-01-03 PROCEDURE — 1159F MED LIST DOCD IN RCRD: CPT | Mod: CPTII,S$GLB,, | Performed by: STUDENT IN AN ORGANIZED HEALTH CARE EDUCATION/TRAINING PROGRAM

## 2024-01-03 PROCEDURE — 3008F BODY MASS INDEX DOCD: CPT | Mod: CPTII,S$GLB,, | Performed by: STUDENT IN AN ORGANIZED HEALTH CARE EDUCATION/TRAINING PROGRAM

## 2024-01-03 PROCEDURE — 99999 PR PBB SHADOW E&M-EST. PATIENT-LVL III: CPT | Mod: PBBFAC,,, | Performed by: STUDENT IN AN ORGANIZED HEALTH CARE EDUCATION/TRAINING PROGRAM

## 2024-01-03 PROCEDURE — 1160F RVW MEDS BY RX/DR IN RCRD: CPT | Mod: CPTII,S$GLB,, | Performed by: STUDENT IN AN ORGANIZED HEALTH CARE EDUCATION/TRAINING PROGRAM

## 2024-01-03 PROCEDURE — 3078F DIAST BP <80 MM HG: CPT | Mod: CPTII,S$GLB,, | Performed by: STUDENT IN AN ORGANIZED HEALTH CARE EDUCATION/TRAINING PROGRAM

## 2024-01-08 ENCOUNTER — OFFICE VISIT (OUTPATIENT)
Dept: FAMILY MEDICINE | Facility: CLINIC | Age: 69
End: 2024-01-08
Payer: MEDICARE

## 2024-01-08 VITALS
HEART RATE: 80 BPM | DIASTOLIC BLOOD PRESSURE: 88 MMHG | BODY MASS INDEX: 25.17 KG/M2 | TEMPERATURE: 98 F | HEIGHT: 63 IN | SYSTOLIC BLOOD PRESSURE: 138 MMHG | WEIGHT: 142.06 LBS | OXYGEN SATURATION: 96 %

## 2024-01-08 DIAGNOSIS — G40.909 SEIZURE DISORDER: ICD-10-CM

## 2024-01-08 DIAGNOSIS — F33.0 MAJOR DEPRESSIVE DISORDER, RECURRENT, MILD: Primary | ICD-10-CM

## 2024-01-08 DIAGNOSIS — F41.1 GAD (GENERALIZED ANXIETY DISORDER): ICD-10-CM

## 2024-01-08 DIAGNOSIS — I10 PRIMARY HYPERTENSION: ICD-10-CM

## 2024-01-08 PROCEDURE — 1126F AMNT PAIN NOTED NONE PRSNT: CPT | Mod: CPTII,S$GLB,, | Performed by: INTERNAL MEDICINE

## 2024-01-08 PROCEDURE — 3075F SYST BP GE 130 - 139MM HG: CPT | Mod: CPTII,S$GLB,, | Performed by: INTERNAL MEDICINE

## 2024-01-08 PROCEDURE — 3008F BODY MASS INDEX DOCD: CPT | Mod: CPTII,S$GLB,, | Performed by: INTERNAL MEDICINE

## 2024-01-08 PROCEDURE — 99214 OFFICE O/P EST MOD 30 MIN: CPT | Mod: S$GLB,,, | Performed by: INTERNAL MEDICINE

## 2024-01-08 PROCEDURE — 3288F FALL RISK ASSESSMENT DOCD: CPT | Mod: CPTII,S$GLB,, | Performed by: INTERNAL MEDICINE

## 2024-01-08 PROCEDURE — 1159F MED LIST DOCD IN RCRD: CPT | Mod: CPTII,S$GLB,, | Performed by: INTERNAL MEDICINE

## 2024-01-08 PROCEDURE — 1101F PT FALLS ASSESS-DOCD LE1/YR: CPT | Mod: CPTII,S$GLB,, | Performed by: INTERNAL MEDICINE

## 2024-01-08 PROCEDURE — 1160F RVW MEDS BY RX/DR IN RCRD: CPT | Mod: CPTII,S$GLB,, | Performed by: INTERNAL MEDICINE

## 2024-01-08 PROCEDURE — 3079F DIAST BP 80-89 MM HG: CPT | Mod: CPTII,S$GLB,, | Performed by: INTERNAL MEDICINE

## 2024-01-08 RX ORDER — VENLAFAXINE HYDROCHLORIDE 150 MG/1
150 CAPSULE, EXTENDED RELEASE ORAL DAILY
COMMUNITY

## 2024-01-08 RX ORDER — LOSARTAN POTASSIUM 100 MG/1
100 TABLET ORAL DAILY
Qty: 90 TABLET | Refills: 3 | Status: SHIPPED | OUTPATIENT
Start: 2024-01-08 | End: 2025-01-07

## 2024-01-08 NOTE — PROGRESS NOTES
Subjective:       Patient ID: Keena Banks is a 68 y.o. female.    Medication List with Changes/Refills   New Medications    LOSARTAN (COZAAR) 100 MG TABLET    Take 1 tablet (100 mg total) by mouth once daily.   Current Medications    ASPIRIN (ECOTRIN) 81 MG EC TABLET    Take 81 mg by mouth once daily.    CALCIUM-VITAMIN D 250 MG-2.5 MCG (100 UNIT) PER TABLET    Take by mouth 2 (two) times daily.    FLUTICASONE PROPIONATE (FLONASE) 50 MCG/ACTUATION NASAL SPRAY    2 sprays (100 mcg total) by Each Nostril route once daily.    LAMOTRIGINE XR (LAMICTAL XR) 200 MG TR24 XR TABLET    Take 2 tablets (400 mg total) by mouth once daily.    MULTIVITAMIN WITH MINERALS TABLET    Take 1 tablet by mouth once daily.    ROSUVASTATIN (CRESTOR) 20 MG TABLET    Take 1 tablet (20 mg total) by mouth once daily.    VENLAFAXINE (EFFEXOR-XR) 150 MG CP24    Take 150 mg by mouth once daily.    ZONISAMIDE (ZONEGRAN) 100 MG CAP    TAKE 3 CAPSULES BY MOUTH EVERY EVENING   Discontinued Medications    AMOXICILLIN-CLAVULANATE 875-125MG (AUGMENTIN) 875-125 MG PER TABLET    Take 1 tablet by mouth 2 (two) times daily.    CELECOXIB (CELEBREX) 200 MG CAPSULE    Take by mouth once daily.    ESCITALOPRAM OXALATE (LEXAPRO) 10 MG TABLET    Take 1 tablet (10 mg total) by mouth once daily for 7 days, THEN 2 tablets (20 mg total) once daily.    HYDROCHLOROTHIAZIDE (HYDRODIURIL) 25 MG TABLET    Take 1 tablet (25 mg total) by mouth once daily.    LOSARTAN (COZAAR) 50 MG TABLET    Take 1 tablet (50 mg total) by mouth once daily.    POTASSIUM CHLORIDE SA (K-DUR,KLOR-CON) 20 MEQ TABLET    Take 1 tablet (20 mEq total) by mouth once daily.    POTASSIUM CHLORIDE SA (K-DUR,KLOR-CON) 20 MEQ TABLET    Take 2 pills bid for 3 days then 1 pill twice a day    VENLAFAXINE (EFFEXOR XR) 75 MG 24 HR CAPSULE    Take 1 capsule (75 mg total) by mouth once daily. for 14 days       Chief Complaint: Follow-up  She is here today to f/u on multiple issues from her appt on 12/13/2023.      She had uncontrolled depression and was advised to wean off effexor and start lexapro. She did not do this due to cost of lexapro. She also had 2 seizures on Holiday day so she did not want to alter any of her medications. She would like to continue on effexor 150 mg daily. She is starting counseling and would like to try increasing her exercise to help with depression. She denies any suicidal ideations. She does have some anxiety.     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 She is taking lamictal  mg qday and zonegran 100 mg in am and 200 mg qhs.  On 12/25/2023 she had 2 small seizures where she was unresponsive for about a minute.  She discussed with neurology who felt her sinus infection on abx and increased stress from the holidays contributed to her seizures. Her levels were checked and were therapeutic.  She denies any further events.     She has hypertension and is taking losartan 50 mg daily and HCTZ 25 mg daily. She has chronic hypokalemia due to HCTZ and takes KCL supplementation. She struggles to take the potasium due to the size of the pill and if she misses a dose then her potasium levels drop.  She denies any chest pain or shortness of breath. No lower leg swelling.        Review of Systems   Constitutional:  Negative for activity change, appetite change, chills, fatigue and fever.   HENT:  Negative for congestion, ear discharge, ear pain, mouth sores, postnasal drip, rhinorrhea, sinus pressure and sore throat.    Eyes:  Negative for pain, discharge and redness.   Respiratory:  Negative for cough, chest tightness, shortness of breath and wheezing.    Gastrointestinal:  Negative for abdominal pain, constipation, diarrhea, nausea and vomiting.   Genitourinary:  Negative for dysuria.   Musculoskeletal:  Negative for arthralgias and neck stiffness.   Skin:  Negative for rash.  "  Neurological:  Negative for headaches.   Hematological:  Negative for adenopathy.   Psychiatric/Behavioral:  Positive for dysphoric mood. Negative for sleep disturbance and suicidal ideas. The patient is nervous/anxious.        Objective:      Vitals:    01/08/24 0836   BP: 138/88   BP Location: Right arm   Patient Position: Sitting   BP Method: Medium (Manual)   Pulse: 80   Temp: 97.5 °F (36.4 °C)   SpO2: 96%   Weight: 64.4 kg (142 lb 1.4 oz)   Height: 5' 3" (1.6 m)     Body mass index is 25.17 kg/m².  Physical Exam    General appearance: No acute distress, cooperative  Neck: FROM, soft, supple, no thyromegaly, no bruits  Lymph: no anterior or posterior cervical adenopathy  Heart::  Regular rate and rhythm, no murmur  Lung: Clear to ascultation bilaterally, no wheezing, no rales, no rhonchi, no distress  Abdomen: Soft, nontender, no distention, no hepatosplenomegaly, bowel sounds normal, no guarding, no rebound, no peritoneal signs  Skin: no rashes, no lesions  Extremities: no edema, no cyanosis  Peripheral pulses: 2+ pedal pulses bilaterally, good perfusion and color      Assessment:       1. Major depressive disorder, recurrent, mild    2. SORAYA (generalized anxiety disorder)    3. Primary hypertension    4. Seizure disorder        Plan:       Major depressive disorder, recurrent, mild with SORAYA (generalized anxiety disorder)  She is still having symptoms but would like to stay on effexor. Will continue at 150 mg daily. She is working on getting a counselor and starting a walking program. Advised of the symptoms of concern to return to clinic    Primary hypertension  Controlled but difficulty taking HCTZ due to chronic hypokalemia (trouble swallowing medication). STop HCTZ and stop KCL.  Increase losartan to 100 mg daily. She will send  me readings in one week and if still elevated then will add amlodipine to her regimen. Check potassium level off supplementation in about 10 days.   -     losartan (COZAAR) 100 MG " tablet; Take 1 tablet (100 mg total) by mouth once daily.  Dispense: 90 tablet; Refill: 3  -     Basic Metabolic Panel; Future; Expected date: 01/08/2024    Seizure disorder  Two breakthrough seizures that were mild.  Good therapeutic levels and neurology advised to continue current regime. Felt due to stress and use of abx for sinus infection. Continue to monitor.     Follow up in about 5 months (around 6/8/2024) for chronic medical issues.

## 2024-01-08 NOTE — PATIENT INSTRUCTIONS
Increase losartan to 100 mg once a day     Stop HCTZ  STop potassium on 1/11/2024    Check BP daily and send reading in one week    Check labs in 10 days

## 2024-01-19 ENCOUNTER — PATIENT MESSAGE (OUTPATIENT)
Dept: FAMILY MEDICINE | Facility: CLINIC | Age: 69
End: 2024-01-19
Payer: MEDICARE

## 2024-01-26 ENCOUNTER — TELEPHONE (OUTPATIENT)
Dept: GASTROENTEROLOGY | Facility: CLINIC | Age: 69
End: 2024-01-26
Payer: MEDICARE

## 2024-01-26 NOTE — TELEPHONE ENCOUNTER
Diagnosis is confirmed from the case order. Screening (but too soon)  BMI: 25.17  First Colonoscopy? no  Family history of colon cancer? no  Medical issues? TIA  Blood thinners? Aspirin     will take pt home after the procedure.   I also inform pt the exact arrival time will be provided to her by the surgery center a couple of days to the afternoon prior to the procedure date.  Inform pt I will send prep instructions via [employer] and Mail.

## 2024-03-27 DIAGNOSIS — G40.909 SEIZURE DISORDER: ICD-10-CM

## 2024-03-27 RX ORDER — LAMOTRIGINE 200 MG/1
400 TABLET, EXTENDED RELEASE ORAL DAILY
Qty: 180 TABLET | Refills: 3 | Status: SHIPPED | OUTPATIENT
Start: 2024-03-27

## 2024-03-27 NOTE — TELEPHONE ENCOUNTER
----- Message from Srinivasa Reinoso sent at 3/27/2024 10:26 AM CDT -----  Regarding: refill  Contact: ERIKA PEREZ [[id number]]  Type: Needs Medical Advice  Who Called:  Erika    Symptoms (please be specific):  SZ disorder    How long has patient had these symptoms:  na    Pharmacy name and phone #:    [id number] - [place] - [address]  [address]  [place] [address]  Phone: [phone] Fax: [phone]      Best Call Back Number: [phone] (home)     Additional Information: Asking for f/u appt with Dr. AGOSTO in May on NS. Needs refill on Lamictal, only has 2 days supply left. Please call to advise.

## 2024-03-28 ENCOUNTER — TELEPHONE (OUTPATIENT)
Dept: NEUROLOGY | Facility: CLINIC | Age: 69
End: 2024-03-28
Payer: MEDICARE

## 2024-04-18 ENCOUNTER — TELEPHONE (OUTPATIENT)
Dept: NEUROLOGY | Facility: CLINIC | Age: 69
End: 2024-04-18
Payer: MEDICARE

## 2024-04-18 NOTE — TELEPHONE ENCOUNTER
Spoke wit the pt and she is Ok with coming in on 5/1/24 at 1240 instead of 1140.  Appt changed in Epic.

## 2024-05-01 ENCOUNTER — OFFICE VISIT (OUTPATIENT)
Dept: NEUROLOGY | Facility: CLINIC | Age: 69
End: 2024-05-01
Payer: MEDICARE

## 2024-05-01 VITALS
HEART RATE: 77 BPM | HEIGHT: 63 IN | RESPIRATION RATE: 14 BRPM | WEIGHT: 142.31 LBS | SYSTOLIC BLOOD PRESSURE: 139 MMHG | BODY MASS INDEX: 25.21 KG/M2 | DIASTOLIC BLOOD PRESSURE: 71 MMHG

## 2024-05-01 DIAGNOSIS — G40.909 SEIZURE DISORDER: Primary | ICD-10-CM

## 2024-05-01 DIAGNOSIS — G40.909 SEIZURE DISORDER: ICD-10-CM

## 2024-05-01 PROCEDURE — 3008F BODY MASS INDEX DOCD: CPT | Mod: CPTII,S$GLB,, | Performed by: STUDENT IN AN ORGANIZED HEALTH CARE EDUCATION/TRAINING PROGRAM

## 2024-05-01 PROCEDURE — 1159F MED LIST DOCD IN RCRD: CPT | Mod: CPTII,S$GLB,, | Performed by: STUDENT IN AN ORGANIZED HEALTH CARE EDUCATION/TRAINING PROGRAM

## 2024-05-01 PROCEDURE — 4010F ACE/ARB THERAPY RXD/TAKEN: CPT | Mod: CPTII,S$GLB,, | Performed by: STUDENT IN AN ORGANIZED HEALTH CARE EDUCATION/TRAINING PROGRAM

## 2024-05-01 PROCEDURE — 3075F SYST BP GE 130 - 139MM HG: CPT | Mod: CPTII,S$GLB,, | Performed by: STUDENT IN AN ORGANIZED HEALTH CARE EDUCATION/TRAINING PROGRAM

## 2024-05-01 PROCEDURE — 3078F DIAST BP <80 MM HG: CPT | Mod: CPTII,S$GLB,, | Performed by: STUDENT IN AN ORGANIZED HEALTH CARE EDUCATION/TRAINING PROGRAM

## 2024-05-01 PROCEDURE — 1101F PT FALLS ASSESS-DOCD LE1/YR: CPT | Mod: CPTII,S$GLB,, | Performed by: STUDENT IN AN ORGANIZED HEALTH CARE EDUCATION/TRAINING PROGRAM

## 2024-05-01 PROCEDURE — 1126F AMNT PAIN NOTED NONE PRSNT: CPT | Mod: CPTII,S$GLB,, | Performed by: STUDENT IN AN ORGANIZED HEALTH CARE EDUCATION/TRAINING PROGRAM

## 2024-05-01 PROCEDURE — 99212 OFFICE O/P EST SF 10 MIN: CPT | Mod: S$GLB,,, | Performed by: STUDENT IN AN ORGANIZED HEALTH CARE EDUCATION/TRAINING PROGRAM

## 2024-05-01 PROCEDURE — 99999 PR PBB SHADOW E&M-EST. PATIENT-LVL III: CPT | Mod: PBBFAC,,, | Performed by: STUDENT IN AN ORGANIZED HEALTH CARE EDUCATION/TRAINING PROGRAM

## 2024-05-01 PROCEDURE — 3288F FALL RISK ASSESSMENT DOCD: CPT | Mod: CPTII,S$GLB,, | Performed by: STUDENT IN AN ORGANIZED HEALTH CARE EDUCATION/TRAINING PROGRAM

## 2024-05-01 RX ORDER — ZONISAMIDE 100 MG/1
CAPSULE ORAL
Qty: 270 CAPSULE | Refills: 2 | Status: SHIPPED | OUTPATIENT
Start: 2024-05-01
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
## 2024-05-13 NOTE — TELEPHONE ENCOUNTER
BMI: 25.21  I call Mrs. Banks to schedule a colonoscopy/EGD as ordered by primary care physician. Patient doesn't answer. I leave patient a brief voicemail to call back. Provide pt with our callback number. [facility] message will be sent if active.

## 2024-05-30 ENCOUNTER — OFFICE VISIT (OUTPATIENT)
Dept: FAMILY MEDICINE | Facility: CLINIC | Age: 69
End: 2024-05-30
Payer: MEDICARE

## 2024-05-30 VITALS
BODY MASS INDEX: 25.14 KG/M2 | SYSTOLIC BLOOD PRESSURE: 136 MMHG | DIASTOLIC BLOOD PRESSURE: 62 MMHG | WEIGHT: 141.88 LBS | HEIGHT: 63 IN | OXYGEN SATURATION: 96 % | HEART RATE: 78 BPM

## 2024-05-30 DIAGNOSIS — F33.0 MAJOR DEPRESSIVE DISORDER, RECURRENT, MILD: ICD-10-CM

## 2024-05-30 DIAGNOSIS — G40.909 SEIZURE DISORDER: ICD-10-CM

## 2024-05-30 DIAGNOSIS — D32.9 MENINGIOMA: ICD-10-CM

## 2024-05-30 DIAGNOSIS — Z85.72 HISTORY OF LYMPHOMA: ICD-10-CM

## 2024-05-30 DIAGNOSIS — E78.5 HYPERLIPIDEMIA, UNSPECIFIED HYPERLIPIDEMIA TYPE: ICD-10-CM

## 2024-05-30 DIAGNOSIS — Z00.00 ENCOUNTER FOR MEDICARE ANNUAL WELLNESS EXAM: Primary | ICD-10-CM

## 2024-05-30 PROCEDURE — G0439 PPPS, SUBSEQ VISIT: HCPCS | Mod: S$GLB,,, | Performed by: NURSE PRACTITIONER

## 2024-05-30 PROCEDURE — 4010F ACE/ARB THERAPY RXD/TAKEN: CPT | Mod: CPTII,S$GLB,, | Performed by: NURSE PRACTITIONER

## 2024-05-30 PROCEDURE — 3288F FALL RISK ASSESSMENT DOCD: CPT | Mod: CPTII,S$GLB,, | Performed by: NURSE PRACTITIONER

## 2024-05-30 PROCEDURE — 3078F DIAST BP <80 MM HG: CPT | Mod: CPTII,S$GLB,, | Performed by: NURSE PRACTITIONER

## 2024-05-30 PROCEDURE — 99999 PR PBB SHADOW E&M-EST. PATIENT-LVL IV: CPT | Mod: PBBFAC,,, | Performed by: NURSE PRACTITIONER

## 2024-05-30 PROCEDURE — 3044F HG A1C LEVEL LT 7.0%: CPT | Mod: CPTII,S$GLB,, | Performed by: NURSE PRACTITIONER

## 2024-05-30 PROCEDURE — 3075F SYST BP GE 130 - 139MM HG: CPT | Mod: CPTII,S$GLB,, | Performed by: NURSE PRACTITIONER

## 2024-05-30 PROCEDURE — 1125F AMNT PAIN NOTED PAIN PRSNT: CPT | Mod: CPTII,S$GLB,, | Performed by: NURSE PRACTITIONER

## 2024-05-30 PROCEDURE — 1101F PT FALLS ASSESS-DOCD LE1/YR: CPT | Mod: CPTII,S$GLB,, | Performed by: NURSE PRACTITIONER

## 2024-05-30 PROCEDURE — 1158F ADVNC CARE PLAN TLK DOCD: CPT | Mod: CPTII,S$GLB,, | Performed by: NURSE PRACTITIONER

## 2024-05-30 NOTE — PATIENT INSTRUCTIONS
Counseling and Referral of Other Preventative  (Italic type indicates deductible and co-insurance are waived)    Patient Name: Keena Banks  Today's Date: 5/30/2024    Health Maintenance       Date Due Completion Date    Lipid Panel 06/09/2024 6/9/2023    Mammogram 07/24/2024 7/24/2023    Colorectal Cancer Screening 08/10/2024 8/10/2016    High Dose Statin 05/01/2025 5/1/2024    Aspirin/Antiplatelet Therapy 05/01/2025 5/1/2024    DEXA Scan 11/13/2026 11/13/2023    Hemoglobin A1c (Diabetic Prevention Screening) 12/07/2026 12/7/2023    TETANUS VACCINE 05/22/2029 5/22/2019        No orders of the defined types were placed in this encounter.    The following information is provided to all patients.  This information is to help you find resources for any of the problems found today that may be affecting your health:                  Living healthy guide: www.[facility].[place].[facility]      Understanding Diabetes: www.diabetes.org      Eating healthy: www.cdc.gov/healthyweight      CDC home safety checklist: www.cdc.gov/steadi/patient.html      Agency on Aging: www.goea.[place].[facility]      Alcoholics anonymous (AA): www.aa.org      Physical Activity: www.stacey.nih.gov/gp5jzov      Tobacco use: www.quitwithusla.org

## 2024-06-13 ENCOUNTER — OFFICE VISIT (OUTPATIENT)
Dept: FAMILY MEDICINE | Facility: CLINIC | Age: 69
End: 2024-06-13
Payer: MEDICARE

## 2024-06-13 VITALS
TEMPERATURE: 98 F | HEART RATE: 73 BPM | BODY MASS INDEX: 25.65 KG/M2 | OXYGEN SATURATION: 96 % | HEIGHT: 63 IN | WEIGHT: 144.75 LBS | SYSTOLIC BLOOD PRESSURE: 128 MMHG | RESPIRATION RATE: 17 BRPM | DIASTOLIC BLOOD PRESSURE: 80 MMHG

## 2024-06-13 DIAGNOSIS — D32.9 MENINGIOMA: ICD-10-CM

## 2024-06-13 DIAGNOSIS — Z86.73 HISTORY OF TIA (TRANSIENT ISCHEMIC ATTACK): ICD-10-CM

## 2024-06-13 DIAGNOSIS — E78.5 HYPERLIPIDEMIA, UNSPECIFIED HYPERLIPIDEMIA TYPE: ICD-10-CM

## 2024-06-13 DIAGNOSIS — I10 PRIMARY HYPERTENSION: ICD-10-CM

## 2024-06-13 DIAGNOSIS — I25.10 CORONARY ARTERY DISEASE INVOLVING NATIVE CORONARY ARTERY OF NATIVE HEART WITHOUT ANGINA PECTORIS: ICD-10-CM

## 2024-06-13 DIAGNOSIS — D64.9 ANEMIA, UNSPECIFIED TYPE: ICD-10-CM

## 2024-06-13 DIAGNOSIS — F33.0 MAJOR DEPRESSIVE DISORDER, RECURRENT, MILD: ICD-10-CM

## 2024-06-13 DIAGNOSIS — Z79.899 MEDICATION MANAGEMENT: ICD-10-CM

## 2024-06-13 DIAGNOSIS — F41.1 GAD (GENERALIZED ANXIETY DISORDER): ICD-10-CM

## 2024-06-13 DIAGNOSIS — G40.909 SEIZURE DISORDER: ICD-10-CM

## 2024-06-13 DIAGNOSIS — Z85.72 HISTORY OF LYMPHOMA: ICD-10-CM

## 2024-06-13 DIAGNOSIS — M19.049 HAND ARTHRITIS: ICD-10-CM

## 2024-06-13 DIAGNOSIS — Z12.31 ENCOUNTER FOR SCREENING MAMMOGRAM FOR MALIGNANT NEOPLASM OF BREAST: ICD-10-CM

## 2024-06-13 DIAGNOSIS — Z00.00 WELL ADULT EXAM: Primary | ICD-10-CM

## 2024-06-13 DIAGNOSIS — J30.9 CHRONIC ALLERGIC RHINITIS: ICD-10-CM

## 2024-06-13 DIAGNOSIS — K76.0 NAFLD (NONALCOHOLIC FATTY LIVER DISEASE): ICD-10-CM

## 2024-06-13 LAB
25(OH)D3+25(OH)D2 SERPL-MCNC: 39 NG/ML (ref 30–96)
ALBUMIN SERPL BCP-MCNC: 4.5 G/DL (ref 3.5–5.2)
ALP SERPL-CCNC: 105 U/L (ref 55–135)
ALT SERPL W/O P-5'-P-CCNC: 19 U/L (ref 10–44)
ANION GAP SERPL CALC-SCNC: 10 MMOL/L (ref 8–16)
AST SERPL-CCNC: 24 U/L (ref 10–40)
BASOPHILS # BLD AUTO: 0.07 K/UL (ref 0–0.2)
BASOPHILS NFR BLD: 1.6 % (ref 0–1.9)
BILIRUB SERPL-MCNC: 0.4 MG/DL (ref 0.1–1)
BUN SERPL-MCNC: 17 MG/DL (ref 8–23)
CALCIUM SERPL-MCNC: 10.4 MG/DL (ref 8.7–10.5)
CHLORIDE SERPL-SCNC: 106 MMOL/L (ref 95–110)
CHOLEST SERPL-MCNC: 185 MG/DL (ref 120–199)
CHOLEST/HDLC SERPL: 2.3 {RATIO} (ref 2–5)
CO2 SERPL-SCNC: 24 MMOL/L (ref 23–29)
CREAT SERPL-MCNC: 1 MG/DL (ref 0.5–1.4)
DIFFERENTIAL METHOD BLD: ABNORMAL
EOSINOPHIL # BLD AUTO: 0.2 K/UL (ref 0–0.5)
EOSINOPHIL NFR BLD: 4.2 % (ref 0–8)
ERYTHROCYTE [DISTWIDTH] IN BLOOD BY AUTOMATED COUNT: 12.8 % (ref 11.5–14.5)
EST. GFR  (NO RACE VARIABLE): >60 ML/MIN/1.73 M^2
ESTIMATED AVG GLUCOSE: 105 MG/DL (ref 68–131)
GLUCOSE SERPL-MCNC: 90 MG/DL (ref 70–110)
HBA1C MFR BLD: 5.3 % (ref 4–5.6)
HCT VFR BLD AUTO: 38.7 % (ref 37–48.5)
HDLC SERPL-MCNC: 79 MG/DL (ref 40–75)
HDLC SERPL: 42.7 % (ref 20–50)
HGB BLD-MCNC: 12.5 G/DL (ref 12–16)
IMM GRANULOCYTES # BLD AUTO: 0.02 K/UL (ref 0–0.04)
IMM GRANULOCYTES NFR BLD AUTO: 0.4 % (ref 0–0.5)
LDLC SERPL CALC-MCNC: 90.4 MG/DL (ref 63–159)
LYMPHOCYTES # BLD AUTO: 1.3 K/UL (ref 1–4.8)
LYMPHOCYTES NFR BLD: 28.2 % (ref 18–48)
MCH RBC QN AUTO: 30.6 PG (ref 27–31)
MCHC RBC AUTO-ENTMCNC: 32.3 G/DL (ref 32–36)
MCV RBC AUTO: 95 FL (ref 82–98)
MONOCYTES # BLD AUTO: 0.4 K/UL (ref 0.3–1)
MONOCYTES NFR BLD: 8.4 % (ref 4–15)
NEUTROPHILS # BLD AUTO: 2.6 K/UL (ref 1.8–7.7)
NEUTROPHILS NFR BLD: 57.2 % (ref 38–73)
NONHDLC SERPL-MCNC: 106 MG/DL
NRBC BLD-RTO: 0 /100 WBC
PLATELET # BLD AUTO: 224 K/UL (ref 150–450)
PMV BLD AUTO: 13.2 FL (ref 9.2–12.9)
POTASSIUM SERPL-SCNC: 3.8 MMOL/L (ref 3.5–5.1)
PROT SERPL-MCNC: 6.8 G/DL (ref 6–8.4)
RBC # BLD AUTO: 4.09 M/UL (ref 4–5.4)
SODIUM SERPL-SCNC: 140 MMOL/L (ref 136–145)
TRIGL SERPL-MCNC: 78 MG/DL (ref 30–150)
TSH SERPL DL<=0.005 MIU/L-ACNC: 1.86 UIU/ML (ref 0.4–4)
WBC # BLD AUTO: 4.51 K/UL (ref 3.9–12.7)

## 2024-06-13 PROCEDURE — 80053 COMPREHEN METABOLIC PANEL: CPT | Performed by: INTERNAL MEDICINE

## 2024-06-13 PROCEDURE — 1125F AMNT PAIN NOTED PAIN PRSNT: CPT | Mod: CPTII,S$GLB,, | Performed by: INTERNAL MEDICINE

## 2024-06-13 PROCEDURE — 82306 VITAMIN D 25 HYDROXY: CPT | Performed by: INTERNAL MEDICINE

## 2024-06-13 PROCEDURE — 83036 HEMOGLOBIN GLYCOSYLATED A1C: CPT | Performed by: INTERNAL MEDICINE

## 2024-06-13 PROCEDURE — 84443 ASSAY THYROID STIM HORMONE: CPT | Performed by: INTERNAL MEDICINE

## 2024-06-13 PROCEDURE — 3079F DIAST BP 80-89 MM HG: CPT | Mod: CPTII,S$GLB,, | Performed by: INTERNAL MEDICINE

## 2024-06-13 PROCEDURE — 99397 PER PM REEVAL EST PAT 65+ YR: CPT | Mod: GZ,S$GLB,, | Performed by: INTERNAL MEDICINE

## 2024-06-13 PROCEDURE — 4010F ACE/ARB THERAPY RXD/TAKEN: CPT | Mod: CPTII,S$GLB,, | Performed by: INTERNAL MEDICINE

## 2024-06-13 PROCEDURE — 1159F MED LIST DOCD IN RCRD: CPT | Mod: CPTII,S$GLB,, | Performed by: INTERNAL MEDICINE

## 2024-06-13 PROCEDURE — 85025 COMPLETE CBC W/AUTO DIFF WBC: CPT | Performed by: INTERNAL MEDICINE

## 2024-06-13 PROCEDURE — 3288F FALL RISK ASSESSMENT DOCD: CPT | Mod: CPTII,S$GLB,, | Performed by: INTERNAL MEDICINE

## 2024-06-13 PROCEDURE — 1160F RVW MEDS BY RX/DR IN RCRD: CPT | Mod: CPTII,S$GLB,, | Performed by: INTERNAL MEDICINE

## 2024-06-13 PROCEDURE — 1101F PT FALLS ASSESS-DOCD LE1/YR: CPT | Mod: CPTII,S$GLB,, | Performed by: INTERNAL MEDICINE

## 2024-06-13 PROCEDURE — 3008F BODY MASS INDEX DOCD: CPT | Mod: CPTII,S$GLB,, | Performed by: INTERNAL MEDICINE

## 2024-06-13 PROCEDURE — 3074F SYST BP LT 130 MM HG: CPT | Mod: CPTII,S$GLB,, | Performed by: INTERNAL MEDICINE

## 2024-06-13 PROCEDURE — 80061 LIPID PANEL: CPT | Performed by: INTERNAL MEDICINE

## 2024-06-13 RX ORDER — VENLAFAXINE HYDROCHLORIDE 150 MG/1
150 CAPSULE, EXTENDED RELEASE ORAL DAILY
Qty: 90 CAPSULE | Refills: 3 | Status: SHIPPED | OUTPATIENT
Start: 2024-06-13

## 2024-06-14 ENCOUNTER — PATIENT MESSAGE (OUTPATIENT)
Dept: FAMILY MEDICINE | Facility: CLINIC | Age: 69
End: 2024-06-14
Payer: MEDICARE

## 2024-06-14 DIAGNOSIS — E78.5 HYPERLIPIDEMIA, UNSPECIFIED HYPERLIPIDEMIA TYPE: ICD-10-CM

## 2024-06-14 RX ORDER — ROSUVASTATIN CALCIUM 20 MG/1
20 TABLET, COATED ORAL
Qty: 90 TABLET | Refills: 3 | Status: SHIPPED | OUTPATIENT
Start: 2024-06-14

## 2024-06-14 NOTE — TELEPHONE ENCOUNTER
No care due was identified.  Health [facility] Embedded Care Due Messages. Reference number: [id number].   6/14/2024 9:07:43 AM CDT

## 2024-06-14 NOTE — TELEPHONE ENCOUNTER
Refill Decision Note   Keena Jocelyn  is requesting a refill authorization.    Brief Assessment and Rationale for Refill:   Approve       Medication Therapy Plan:         Comments:     Note composed:9:10 AM 06/14/2024

## 2024-06-17 NOTE — PROGRESS NOTES
"Keena Banks presented for an initial Medicare AWV today. The following components were reviewed and updated:    Medical history  Family History  Social history  Allergies and Current Medications  Health Risk Assessment  Health Maintenance  Care Team    **See Completed Assessments for Annual Wellness visit with in the encounter summary    The following assessments were completed:  Depression Screening  Cognitive function Screening    Declines neuro eval  Timed Get Up Test  Whisper Test      Opioid documentation:      Patient does not have a current opioid prescription.          Vitals:    05/30/24 1338   BP: 136/62   BP Location: Right arm   Patient Position: Sitting   Pulse: 78   SpO2: 96%   Weight: 64.4 kg (141 lb 13.9 oz)   Height: 5' 3" (1.6 m)     Body mass index is 25.13 kg/m².       Physical Exam  Vitals reviewed.   Constitutional:       General: She is not in acute distress.  HENT:      Head: Normocephalic.   Cardiovascular:      Rate and Rhythm: Normal rate.   Pulmonary:      Effort: Pulmonary effort is normal. No respiratory distress.   Skin:     General: Skin is warm.   Neurological:      Mental Status: She is alert.   Psychiatric:         Mood and Affect: Mood normal.           Diagnoses and health risks identified today and associated recommendations/orders:  1. Encounter for Medicare annual wellness exam  Reviewed health maintenance and provided recommendations   Mammogram scheduled  All additional health maintenance is uTD   - Ambulatory Referral/Consult to Enhanced Annual Wellness Visit (eAWV)    2. Meningioma  Continue to monitor  Followed by Dr Caldwell.      3. History of lymphoma  Continue to monitor  Followed by Aryan Copeland.      4. Major depressive disorder, recurrent, mild  Continue to monitor  Followed by Marisela Lockwood DO   Taking effexor, no si/hi.      5. Seizure disorder  Continue to monitor  Followed by Marisela Lockwood DO .      6. Hyperlipidemia, unspecified hyperlipidemia " type  Continue to monitor  Followed by Marisela Lockwood DO .        Provided Keena with a 5-10 year written screening schedule and personal prevention plan. Recommendations were developed using the USPSTF age appropriate recommendations. Education, counseling, and referrals were provided as needed.  After Visit Summary printed and given to patient which includes a list of additional screenings\tests needed.    No follow-ups on file.      Eva Travis NP

## 2024-07-08 ENCOUNTER — OFFICE VISIT (OUTPATIENT)
Dept: ORTHOPEDICS | Facility: CLINIC | Age: 69
End: 2024-07-08
Payer: MEDICARE

## 2024-07-08 ENCOUNTER — HOSPITAL ENCOUNTER (OUTPATIENT)
Dept: RADIOLOGY | Facility: HOSPITAL | Age: 69
Discharge: HOME | End: 2024-07-08
Attending: ORTHOPAEDIC SURGERY
Payer: MEDICARE

## 2024-07-08 VITALS — BODY MASS INDEX: 25.62 KG/M2 | HEIGHT: 63 IN | WEIGHT: 144.63 LBS

## 2024-07-08 DIAGNOSIS — M15.1 DEGENERATIVE ARTHRITIS OF DISTAL INTERPHALANGEAL JOINT OF MIDDLE FINGER OF RIGHT HAND: Primary | ICD-10-CM

## 2024-07-08 DIAGNOSIS — M19.049 HAND ARTHRITIS: ICD-10-CM

## 2024-07-08 PROCEDURE — 1125F AMNT PAIN NOTED PAIN PRSNT: CPT | Mod: CPTII,S$GLB,, | Performed by: ORTHOPAEDIC SURGERY

## 2024-07-08 PROCEDURE — 99204 OFFICE O/P NEW MOD 45 MIN: CPT | Mod: S$GLB,,, | Performed by: ORTHOPAEDIC SURGERY

## 2024-07-08 PROCEDURE — 3008F BODY MASS INDEX DOCD: CPT | Mod: CPTII,S$GLB,, | Performed by: ORTHOPAEDIC SURGERY

## 2024-07-08 PROCEDURE — 4010F ACE/ARB THERAPY RXD/TAKEN: CPT | Mod: CPTII,S$GLB,, | Performed by: ORTHOPAEDIC SURGERY

## 2024-07-08 PROCEDURE — 3044F HG A1C LEVEL LT 7.0%: CPT | Mod: CPTII,S$GLB,, | Performed by: ORTHOPAEDIC SURGERY

## 2024-07-08 PROCEDURE — 99999 PR PBB SHADOW E&M-EST. PATIENT-LVL III: CPT | Mod: PBBFAC,,, | Performed by: ORTHOPAEDIC SURGERY

## 2024-07-08 PROCEDURE — 1159F MED LIST DOCD IN RCRD: CPT | Mod: CPTII,S$GLB,, | Performed by: ORTHOPAEDIC SURGERY

## 2024-07-08 PROCEDURE — 1101F PT FALLS ASSESS-DOCD LE1/YR: CPT | Mod: CPTII,S$GLB,, | Performed by: ORTHOPAEDIC SURGERY

## 2024-07-08 PROCEDURE — 73130 X-RAY EXAM OF HAND: CPT | Mod: TC,PO,RT

## 2024-07-08 PROCEDURE — 3288F FALL RISK ASSESSMENT DOCD: CPT | Mod: CPTII,S$GLB,, | Performed by: ORTHOPAEDIC SURGERY

## 2024-07-08 PROCEDURE — 73130 X-RAY EXAM OF HAND: CPT | Mod: 26,RT,, | Performed by: RADIOLOGY

## 2024-07-08 NOTE — PROGRESS NOTES
7/8/2024    Chief Complaint:  Chief Complaint   Patient presents with    Right Hand - Pain, Injury       HPI:  Keena Banks is a 69 y.o. female, who presents to clinic today she has a history of arthritis to both of her hands.  The right is worse than the left.  This has been going on for quite some time and is gradually getting worse.  She was had a steroid injection for her shoulder in the past which did give her some hand relief.  She was questioning steroid injections to the hand.  She is here today for evaluation and treatment.    PMHX:  Past Medical History:   Diagnosis Date    Depression     anxiety and depression situational    Hypertension     Menopausal symptom     vaginal dryness and hot flashes    NHL (non-Hodgkin's lymphoma) 2013    s/p 8 cycles of CHOP/Rituxan    Osteoarthritis     both hands    Seasonal allergies     seasonal and animal    Seizures 2013    Started 11/2014 after treatment for NHL, MRI brain normal, Grand mal 10/2014 and 6/2014 started several months after chemo    Skin cancer 01/2020    TIA (transient ischemic attack) 05/09/2022    [facility]       PSHX:  Past Surgical History:   Procedure Laterality Date    BONE MARROW ASPIRATION      CARPAL TUNNEL RELEASE Right     CARPAL TUNNEL RELEASE      CATARACT EXTRACTION Bilateral 10/01/2022    oct left eye. nov right eye    CATARACT EXTRACTION, BILATERAL      COLONOSCOPY  09/15/2006    CATHLEEN.   One 2-3 mm polyp in the hepatic flexure.  HYPERPLASTIC POLYP.  Small internal hemorrhoids.    COLONOSCOPY N/A 08/10/2016    Procedure: COLONOSCOPY;  Surgeon: Patel Foster Jr., MD;  Location: [facility];  Service: Endoscopy;  Laterality: N/A;    HYSTERECTOMY      total, including ovaries secondary to endometriosis, fibroids    LYMPH NODE BIOPSY  01/24/2012    Left cervical lymph node    OOPHORECTOMY      PORTACATH PLACEMENT      portacath removal  2015    SALIVARY GLAND SURGERY Right     removed for large stone    TONSILLECTOMY       VAGINAL DELIVERY      times 2       FMHX:  Family History   Problem Relation Name Age of Onset    Cancer Father          cardiac    Alzheimer's disease Mother      Cancer Paternal Uncle twin-Father     No Known Problems Brother      No Known Problems Daughter      Alzheimer's disease Maternal Grandmother      Cancer Maternal Grandfather          prostate    Cancer Paternal Grandfather          lung    No Known Problems Daughter      Breast cancer Neg Hx         SOCHX:  Social History     Tobacco Use    Smoking status: Never    Smokeless tobacco: Never   Substance Use Topics    Alcohol use: No     Comment: Occasional, rare       ALLERGIES:  Adhesive and Sulfa (sulfonamide antibiotics)    CURRENT MEDICATIONS:  Current Outpatient Medications on File Prior to Visit   Medication Sig Dispense Refill    aspirin (ECOTRIN) 81 MG EC tablet Take 81 mg by mouth once daily.      calcium-vitamin D 250 mg-2.5 mcg (100 unit) per tablet Take by mouth 2 (two) times daily.      fluticasone propionate (FLONASE) 50 mcg/actuation nasal spray 2 sprays (100 mcg total) by Each Nostril route once daily. 16 g 6    lamotrigine XR (LAMICTAL XR) 200 mg TR24 XR tablet Take 2 tablets (400 mg total) by mouth once daily. 180 tablet 3    losartan (COZAAR) 100 MG tablet Take 1 tablet (100 mg total) by mouth once daily. 90 tablet 3    multivitamin with minerals tablet Take 1 tablet by mouth once daily.      rosuvastatin (CRESTOR) 20 MG tablet TAKE 1 TABLET BY MOUTH EVERY DAY 90 tablet 3    venlafaxine (EFFEXOR-XR) 150 MG Cp24 Take 1 capsule (150 mg total) by mouth once daily. 90 capsule 3    zonisamide (ZONEGRAN) 100 MG Cap TAKE 3 CAPSULES BY MOUTH EVERY EVENING 270 capsule 2     No current facility-administered medications on file prior to visit.       REVIEW OF SYSTEMS:  Review of Systems   Constitutional: Negative.    Eyes: Negative.    Cardiovascular: Negative.    Gastrointestinal: Negative.    Genitourinary: Negative.    Musculoskeletal:  Positive  "for joint pain.   Skin: Negative.    Neurological: Negative.    Endo/Heme/Allergies: Negative.    Psychiatric/Behavioral: Negative.       GENERAL PHYSICAL EXAM:   Ht 5' 3" (1.6 m)   Wt 65.6 kg (144 lb 10 oz)   BMI 25.62 kg/m²    GEN: well developed, well nourished, no acute distress   HENT: Normocephalic, atraumatic   EYES: No discharge, conjunctiva normal   NECK: Supple, non-tender   PULM: No wheezing, no respiratory distress   CV: RRR   ABD: Soft, non-tender    ORTHO EXAM:   Examination of the right hand reveals that there are changes consistent with severe arthritis.  This affects mainly the distal interphalangeal joint.  The proximal interphalangeal joints are also mildly affected.  Palpation about the hand and wrist does produce tenderness over those joints.  She was able to make a near full composite fist and extend her fingers.  She does have sensation intact and capillary refill less than 2 seconds    RADIOLOGY:   X-rays of the right hand were taken in clinic today she was noted have severe arthritis throughout multiple joints.  The worst of which is the right middle finger distal interphalangeal joint.  She does have significant changes to other joints noted.    ASSESSMENT:   Right hand arthritis/right middle finger distal interphalangeal joint arthritis    PLAN:  1. Have reviewed treatment options.  We have discussed observation and steroid injections and surgical treatment for fusion.  I did discuss the risks and the benefits of surgical fusion.  After discussion of those risks and benefits consent has been obtained     2. The patient will go home to discuss this with her  prior to committing to a final decision on surgery but she will undergo all the paperwork today.  If she does decide she would like to have the surgery we can set her up with a date     3. If she decides that she had like do the surgery we will proceed with right middle finger distal interphalangeal joint arthrodesis under " general anesthesia     4. She will follow up with me 2 weeks postoperatively for as needed

## 2024-07-11 ENCOUNTER — TELEPHONE (OUTPATIENT)
Dept: ORTHOPEDICS | Facility: CLINIC | Age: 69
End: 2024-07-11
Payer: MEDICARE

## 2024-07-11 NOTE — TELEPHONE ENCOUNTER
Spoke with patient.  I told her Dr. Brown's nurse will be back in clinic and someone will reach as soon as possible.   
Cardiology

## 2024-07-12 ENCOUNTER — OFFICE VISIT (OUTPATIENT)
Dept: URGENT CARE | Facility: CLINIC | Age: 69
End: 2024-07-12
Payer: MEDICARE

## 2024-07-12 VITALS
WEIGHT: 144 LBS | HEIGHT: 63 IN | TEMPERATURE: 98 F | DIASTOLIC BLOOD PRESSURE: 82 MMHG | SYSTOLIC BLOOD PRESSURE: 152 MMHG | OXYGEN SATURATION: 95 % | RESPIRATION RATE: 15 BRPM | BODY MASS INDEX: 25.52 KG/M2 | HEART RATE: 74 BPM

## 2024-07-12 DIAGNOSIS — J06.9 VIRAL URI WITH COUGH: Primary | ICD-10-CM

## 2024-07-12 DIAGNOSIS — R05.9 COUGH, UNSPECIFIED TYPE: ICD-10-CM

## 2024-07-12 LAB
CTP QC/QA: YES
SARS-COV-2 AG RESP QL IA.RAPID: NEGATIVE

## 2024-07-12 RX ORDER — BENZONATATE 200 MG/1
200 CAPSULE ORAL 3 TIMES DAILY PRN
Qty: 30 CAPSULE | Refills: 0 | Status: SHIPPED | OUTPATIENT
Start: 2024-07-12

## 2024-07-12 RX ORDER — GUAIFENESIN 600 MG/1
1200 TABLET, EXTENDED RELEASE ORAL 2 TIMES DAILY PRN
Qty: 30 TABLET | Refills: 0 | Status: SHIPPED | OUTPATIENT
Start: 2024-07-12

## 2024-07-12 NOTE — PATIENT INSTRUCTIONS

## 2024-07-12 NOTE — PROGRESS NOTES
"Subjective:      Patient ID: Keena Banks is a 69 y.o. female.    Vitals:  height is 5' 3" (1.6 m) and weight is 65.3 kg (144 lb). Her oral temperature is 97.6 °F (36.4 °C). Her blood pressure is 152/82 (abnormal) and her pulse is 74. Her respiration is 15 and oxygen saturation is 95%.     Chief Complaint: Cough    Patient is here today with a c/o cough with clear sputum. Patient states she have been experiencing her symptoms for four days.  She also states she tried Flonase ane mucinex cough syrup.    Cough  This is a new problem. The current episode started in the past 7 days. The problem has been gradually worsening. The problem occurs constantly. The cough is Productive of sputum and productive of purulent sputum. Associated symptoms include headaches and postnasal drip. Pertinent negatives include no chest pain, chills, ear pain, rash or sore throat. Nothing aggravates the symptoms. She has tried OTC cough suppressant for the symptoms. The treatment provided mild relief.       Constitution: Negative. Negative for chills, sweating and fatigue.   HENT:  Positive for postnasal drip. Negative for ear pain, facial swelling, congestion and sore throat.    Neck: Negative for painful lymph nodes.   Cardiovascular: Negative.  Negative for chest trauma, chest pain and sob on exertion.   Eyes: Negative.  Negative for eye itching and eye pain.   Respiratory:  Positive for cough. Negative for chest tightness and asthma.    Gastrointestinal: Negative.  Negative for nausea, vomiting and diarrhea.   Endocrine: negative. cold intolerance and excessive thirst.   Genitourinary: Negative.  Negative for dysuria, frequency, urgency and hematuria.   Musculoskeletal:  Negative for pain, trauma and joint pain.   Skin: Negative.  Negative for rash, wound and hives.   Allergic/Immunologic: Negative.  Negative for eczema, asthma, hives and itching.   Neurological:  Positive for headaches. Negative for disorientation and altered mental " status.   Hematologic/Lymphatic: Negative.  Negative for swollen lymph nodes.   Psychiatric/Behavioral: Negative.  Negative for altered mental status, disorientation and confusion.       Objective:     Physical Exam   Constitutional: She is oriented to person, place, and time. She appears well-developed. She is cooperative.  Non-toxic appearance. She does not appear ill. No distress.   HENT:   Head: Normocephalic and atraumatic.   Ears:   Right Ear: Hearing, tympanic membrane, external ear and ear canal normal. no impacted cerumen  Left Ear: Hearing, tympanic membrane, external ear and ear canal normal. no impacted cerumen  Nose: Nose normal. No mucosal edema, rhinorrhea, nasal deformity or congestion. No epistaxis. Right sinus exhibits no maxillary sinus tenderness and no frontal sinus tenderness. Left sinus exhibits no maxillary sinus tenderness and no frontal sinus tenderness.   Mouth/Throat: Uvula is midline, oropharynx is clear and moist and mucous membranes are normal. No trismus in the jaw. Normal dentition. No uvula swelling. No oropharyngeal exudate, posterior oropharyngeal edema or posterior oropharyngeal erythema. Tonsils are 0 on the right. Tonsils are 0 on the left. No tonsillar exudate.   Eyes: Conjunctivae and lids are normal. No scleral icterus.   Neck: Trachea normal and phonation normal. Neck supple. No edema present. No erythema present. No neck rigidity present.   Cardiovascular: Normal rate, regular rhythm, normal heart sounds and normal pulses.   Pulmonary/Chest: Effort normal and breath sounds normal. No stridor. No respiratory distress. She has no decreased breath sounds. She has no wheezes. She has no rhonchi. She has no rales. She exhibits no tenderness.   Abdominal: Normal appearance. There is no abdominal tenderness.   Musculoskeletal: Normal range of motion.         General: No deformity. Normal range of motion.   Lymphadenopathy:     She has no cervical adenopathy.   Neurological: no  focal deficit. She is alert, oriented to person, place, and time and at baseline. She exhibits normal muscle tone. Coordination normal.   Skin: Skin is warm, dry, intact, not diaphoretic and not pale.   Psychiatric: Her speech is normal and behavior is normal. Mood, judgment and thought content normal.   Nursing note and vitals reviewed.  The following results have been reviewed with the patient:  LABS-  Results for orders placed or performed in visit on 07/12/24   SARS Coronavirus 2 Antigen, POCT Manual Read   Result Value Ref Range    SARS Coronavirus 2 Antigen Negative Negative     Acceptable Yes           Assessment:     1. Viral URI with cough    2. Cough, unspecified type        Plan:   FOLLOWUP  Follow up if symptoms worsen or fail to improve, for PLEASE CONTACT PCP OR CONTACT THE EMERGENCY ROOM..     PATIENT INSTRUCTIONS  Patient Instructions   INSTRUCTIONS:  - Rest.  - Drink plenty of fluids.  - Take Tylenol and/or Ibuprofen as directed as needed for fever/pain.  Do not take more than the recommended dose.  - follow up with your PCP within the next 1-2 weeks as needed.  - You must understand that you have received an Urgent Care treatment only and that you may be released before all of your medical problems are known or treated.   - You, the patient, will arrange for follow up care as instructed.   - If your condition worsens or fails to improve we recommend that you receive another evaluation at the ER immediately or contact your PCP to discuss your concerns.   - You can call [phone] or [phone] to help schedule an appointment with the appropriate provider.     -If you smoke cigarettes, it would be beneficial for you to stop.        THANK YOU FOR ALLOWING ME TO PARTICIPATE IN YOUR HEALTHCARE,     Griffin Arreguin NP     Viral URI with cough  -     benzonatate (TESSALON) 200 MG capsule; Take 1 capsule (200 mg total) by mouth 3 (three) times daily as needed for Cough.  Dispense:  30 capsule; Refill: 0  -     guaiFENesin (MUCINEX) 600 mg 12 hr tablet; Take 2 tablets (1,200 mg total) by mouth 2 (two) times daily as needed for Congestion.  Dispense: 30 tablet; Refill: 0    Cough, unspecified type  -     SARS Coronavirus 2 Antigen, POCT Manual Read

## 2024-07-15 DIAGNOSIS — M15.1 DEGENERATIVE ARTHRITIS OF DISTAL INTERPHALANGEAL JOINT OF MIDDLE FINGER OF RIGHT HAND: Primary | ICD-10-CM

## 2024-07-15 RX ORDER — MUPIROCIN 20 MG/G
OINTMENT TOPICAL
OUTPATIENT
Start: 2024-07-15

## 2024-07-15 RX ORDER — CEFAZOLIN SODIUM 2 G/50ML
2 SOLUTION INTRAVENOUS
OUTPATIENT
Start: 2024-07-15

## 2024-07-16 ENCOUNTER — TELEPHONE (OUTPATIENT)
Dept: ORTHOPEDICS | Facility: CLINIC | Age: 69
End: 2024-07-16
Payer: MEDICARE

## 2024-07-16 NOTE — TELEPHONE ENCOUNTER
----- Message from Omar Myers sent at 7/16/2024  4:27 PM CDT -----  Patient would like a callback regarding rescheduling her surgery.    [phone]

## 2024-07-16 NOTE — TELEPHONE ENCOUNTER
Called and spoke with patient regarding her surgery.  Patient requested a r/s from 08/06/ to 08/27 due to her being on vacation at that time. I message Shayy magallon for this r/s.

## 2024-07-18 ENCOUNTER — TELEPHONE (OUTPATIENT)
Dept: FAMILY MEDICINE | Facility: CLINIC | Age: 69
End: 2024-07-18

## 2024-07-18 NOTE — TELEPHONE ENCOUNTER
----- Message from Tila Gusman sent at 7/17/2024  9:11 AM CDT -----  Type:  Sooner Appointment Request    Caller is requesting a sooner appointment.  Caller declined first available appointment listed below.  Caller will not accept being placed on the waitlist and is requesting a message be sent to doctor.    Name of Caller:  pt  When is the first available appointment?  07/22  Symptoms:  cough  Would the patient rather a call back or a response via [facility]? Call back  Best Call Back Number:  [phone]  Additional Information:  pt states that first available does not work for them and needs to be seen ASAP for a cough she has been having for about a week and a half. Pt says she has been to urgent care and is still having the cough. Pt says she would like to be seen today if possible.  Please call back and advise. Thanks!

## 2024-08-12 DIAGNOSIS — M15.1 DEGENERATIVE ARTHRITIS OF DISTAL INTERPHALANGEAL JOINT OF MIDDLE FINGER OF RIGHT HAND: Primary | ICD-10-CM

## 2024-08-20 DIAGNOSIS — I10 PRIMARY HYPERTENSION: ICD-10-CM

## 2024-08-20 DIAGNOSIS — I10 ESSENTIAL HYPERTENSION: ICD-10-CM

## 2024-08-22 RX ORDER — LOSARTAN POTASSIUM 50 MG/1
50 TABLET ORAL
Qty: 90 TABLET | Refills: 3 | OUTPATIENT
Start: 2024-08-22

## 2024-08-22 RX ORDER — LOSARTAN POTASSIUM 100 MG/1
100 TABLET ORAL DAILY
Qty: 90 TABLET | Refills: 3 | Status: SHIPPED | OUTPATIENT
Start: 2024-08-22 | End: 2025-08-22

## 2024-08-23 ENCOUNTER — TELEPHONE (OUTPATIENT)
Dept: SURGERY | Facility: HOSPITAL | Age: 69
End: 2024-08-23
Payer: MEDICARE

## 2024-08-23 NOTE — TELEPHONE ENCOUNTER
Spoke with patient about upcoming procedure scheduled for 8/27. Patient states she would like instructions regarding her aspirin for the procedure. Thank you!

## 2024-08-26 ENCOUNTER — ANESTHESIA EVENT (OUTPATIENT)
Dept: SURGERY | Facility: HOSPITAL | Age: 69
End: 2024-08-26
Payer: MEDICARE

## 2024-08-27 ENCOUNTER — HOSPITAL ENCOUNTER (OUTPATIENT)
Dept: RADIOLOGY | Facility: HOSPITAL | Age: 69
Discharge: HOME | End: 2024-08-27
Attending: ORTHOPAEDIC SURGERY
Payer: MEDICARE

## 2024-08-27 ENCOUNTER — HOSPITAL ENCOUNTER (OUTPATIENT)
Facility: HOSPITAL | Age: 69
Discharge: HOME | End: 2024-08-27
Attending: ORTHOPAEDIC SURGERY | Admitting: ORTHOPAEDIC SURGERY
Payer: MEDICARE

## 2024-08-27 ENCOUNTER — ANESTHESIA (OUTPATIENT)
Dept: SURGERY | Facility: HOSPITAL | Age: 69
End: 2024-08-27
Payer: MEDICARE

## 2024-08-27 VITALS
SYSTOLIC BLOOD PRESSURE: 175 MMHG | TEMPERATURE: 99 F | DIASTOLIC BLOOD PRESSURE: 82 MMHG | BODY MASS INDEX: 25.34 KG/M2 | RESPIRATION RATE: 16 BRPM | WEIGHT: 143 LBS | OXYGEN SATURATION: 96 % | HEIGHT: 63 IN | HEART RATE: 81 BPM

## 2024-08-27 DIAGNOSIS — M15.1 DEGENERATIVE ARTHRITIS OF DISTAL INTERPHALANGEAL JOINT OF MIDDLE FINGER OF RIGHT HAND: ICD-10-CM

## 2024-08-27 DIAGNOSIS — Z98.890 STATUS POST SURGERY: Primary | ICD-10-CM

## 2024-08-27 DIAGNOSIS — M79.644 FINGER PAIN, RIGHT: ICD-10-CM

## 2024-08-27 PROCEDURE — 71000039 HC RECOVERY, EACH ADD'L HOUR: Mod: PO | Performed by: ORTHOPAEDIC SURGERY

## 2024-08-27 PROCEDURE — 63600175 PHARM REV CODE 636 W HCPCS: Mod: PO | Performed by: ANESTHESIOLOGY

## 2024-08-27 PROCEDURE — 25000003 PHARM REV CODE 250: Mod: PO | Performed by: ORTHOPAEDIC SURGERY

## 2024-08-27 PROCEDURE — 71000033 HC RECOVERY, INTIAL HOUR: Mod: PO | Performed by: ORTHOPAEDIC SURGERY

## 2024-08-27 PROCEDURE — 36000709 HC OR TIME LEV III EA ADD 15 MIN: Mod: PO | Performed by: ORTHOPAEDIC SURGERY

## 2024-08-27 PROCEDURE — 37000008 HC ANESTHESIA 1ST 15 MINUTES: Mod: PO | Performed by: ORTHOPAEDIC SURGERY

## 2024-08-27 PROCEDURE — 27201423 OPTIME MED/SURG SUP & DEVICES STERILE SUPPLY: Mod: PO | Performed by: ORTHOPAEDIC SURGERY

## 2024-08-27 PROCEDURE — 25000003 PHARM REV CODE 250: Mod: PO | Performed by: ANESTHESIOLOGY

## 2024-08-27 PROCEDURE — 63600175 PHARM REV CODE 636 W HCPCS: Mod: PO | Performed by: NURSE ANESTHETIST, CERTIFIED REGISTERED

## 2024-08-27 PROCEDURE — C1713 ANCHOR/SCREW BN/BN,TIS/BN: HCPCS | Mod: PO | Performed by: ORTHOPAEDIC SURGERY

## 2024-08-27 PROCEDURE — 36000708 HC OR TIME LEV III 1ST 15 MIN: Mod: PO | Performed by: ORTHOPAEDIC SURGERY

## 2024-08-27 PROCEDURE — 63600175 PHARM REV CODE 636 W HCPCS: Mod: JZ,JG,PO | Performed by: ORTHOPAEDIC SURGERY

## 2024-08-27 PROCEDURE — 26860 FUSION OF FINGER JOINT: CPT | Mod: F7,,, | Performed by: ORTHOPAEDIC SURGERY

## 2024-08-27 PROCEDURE — 25000003 PHARM REV CODE 250: Mod: PO | Performed by: NURSE ANESTHETIST, CERTIFIED REGISTERED

## 2024-08-27 PROCEDURE — 63600175 PHARM REV CODE 636 W HCPCS: Mod: PO | Performed by: PHYSICIAN ASSISTANT

## 2024-08-27 PROCEDURE — 25000003 PHARM REV CODE 250: Mod: PO | Performed by: PHYSICIAN ASSISTANT

## 2024-08-27 PROCEDURE — 76000 FLUOROSCOPY <1 HR PHYS/QHP: CPT | Mod: TC,PO

## 2024-08-27 PROCEDURE — 37000009 HC ANESTHESIA EA ADD 15 MINS: Mod: PO | Performed by: ORTHOPAEDIC SURGERY

## 2024-08-27 PROCEDURE — 27200651 HC AIRWAY, LMA: Mod: PO | Performed by: ANESTHESIOLOGY

## 2024-08-27 RX ORDER — PHENYLEPHRINE HYDROCHLORIDE 10 MG/ML
INJECTION INTRAVENOUS
Status: DISCONTINUED | OUTPATIENT
Start: 2024-08-27 | End: 2024-08-27

## 2024-08-27 RX ORDER — GLUCAGON 1 MG
1 KIT INJECTION
Status: DISCONTINUED | OUTPATIENT
Start: 2024-08-27 | End: 2024-08-27 | Stop reason: HOSPADM

## 2024-08-27 RX ORDER — FENTANYL CITRATE 50 UG/ML
INJECTION, SOLUTION INTRAMUSCULAR; INTRAVENOUS
Status: DISCONTINUED | OUTPATIENT
Start: 2024-08-27 | End: 2024-08-27

## 2024-08-27 RX ORDER — OXYCODONE HYDROCHLORIDE 5 MG/1
5 TABLET ORAL
Status: DISCONTINUED | OUTPATIENT
Start: 2024-08-27 | End: 2024-08-27 | Stop reason: HOSPADM

## 2024-08-27 RX ORDER — BUPIVACAINE HYDROCHLORIDE 2.5 MG/ML
INJECTION, SOLUTION EPIDURAL; INFILTRATION; INTRACAUDAL
Status: DISCONTINUED | OUTPATIENT
Start: 2024-08-27 | End: 2024-08-27 | Stop reason: HOSPADM

## 2024-08-27 RX ORDER — ONDANSETRON HYDROCHLORIDE 2 MG/ML
INJECTION, SOLUTION INTRAVENOUS
Status: DISCONTINUED | OUTPATIENT
Start: 2024-08-27 | End: 2024-08-27

## 2024-08-27 RX ORDER — LIDOCAINE HYDROCHLORIDE 20 MG/ML
INJECTION INTRAVENOUS
Status: DISCONTINUED | OUTPATIENT
Start: 2024-08-27 | End: 2024-08-27

## 2024-08-27 RX ORDER — FENTANYL CITRATE 50 UG/ML
25 INJECTION, SOLUTION INTRAMUSCULAR; INTRAVENOUS EVERY 5 MIN PRN
Status: DISCONTINUED | OUTPATIENT
Start: 2024-08-27 | End: 2024-08-27 | Stop reason: HOSPADM

## 2024-08-27 RX ORDER — PROPOFOL 10 MG/ML
VIAL (ML) INTRAVENOUS
Status: DISCONTINUED | OUTPATIENT
Start: 2024-08-27 | End: 2024-08-27

## 2024-08-27 RX ORDER — SODIUM CHLORIDE, SODIUM LACTATE, POTASSIUM CHLORIDE, CALCIUM CHLORIDE 600; 310; 30; 20 MG/100ML; MG/100ML; MG/100ML; MG/100ML
INJECTION, SOLUTION INTRAVENOUS CONTINUOUS
Status: DISCONTINUED | OUTPATIENT
Start: 2024-08-27 | End: 2024-08-27 | Stop reason: HOSPADM

## 2024-08-27 RX ORDER — DEXAMETHASONE SODIUM PHOSPHATE 4 MG/ML
8 INJECTION, SOLUTION INTRA-ARTICULAR; INTRALESIONAL; INTRAMUSCULAR; INTRAVENOUS; SOFT TISSUE
Status: DISCONTINUED | OUTPATIENT
Start: 2024-08-27 | End: 2024-08-27 | Stop reason: HOSPADM

## 2024-08-27 RX ORDER — OXYCODONE HYDROCHLORIDE 5 MG/1
5 TABLET ORAL EVERY 4 HOURS PRN
Qty: 10 TABLET | Refills: 0 | Status: SHIPPED | OUTPATIENT
Start: 2024-08-27

## 2024-08-27 RX ORDER — HYDROMORPHONE HYDROCHLORIDE 2 MG/ML
0.2 INJECTION, SOLUTION INTRAMUSCULAR; INTRAVENOUS; SUBCUTANEOUS EVERY 5 MIN PRN
Status: DISCONTINUED | OUTPATIENT
Start: 2024-08-27 | End: 2024-08-27 | Stop reason: HOSPADM

## 2024-08-27 RX ORDER — LIDOCAINE HYDROCHLORIDE 10 MG/ML
INJECTION, SOLUTION EPIDURAL; INFILTRATION; INTRACAUDAL; PERINEURAL
Status: DISCONTINUED | OUTPATIENT
Start: 2024-08-27 | End: 2024-08-27 | Stop reason: HOSPADM

## 2024-08-27 RX ORDER — MIDAZOLAM HYDROCHLORIDE 1 MG/ML
INJECTION INTRAMUSCULAR; INTRAVENOUS
Status: DISCONTINUED | OUTPATIENT
Start: 2024-08-27 | End: 2024-08-27

## 2024-08-27 RX ORDER — LIDOCAINE HYDROCHLORIDE 10 MG/ML
1 INJECTION, SOLUTION EPIDURAL; INFILTRATION; INTRACAUDAL; PERINEURAL ONCE
Status: DISCONTINUED | OUTPATIENT
Start: 2024-08-27 | End: 2024-08-27 | Stop reason: HOSPADM

## 2024-08-27 RX ORDER — MUPIROCIN 20 MG/G
OINTMENT TOPICAL
Status: DISCONTINUED | OUTPATIENT
Start: 2024-08-27 | End: 2024-08-27 | Stop reason: HOSPADM

## 2024-08-27 RX ORDER — KETOROLAC TROMETHAMINE 30 MG/ML
INJECTION, SOLUTION INTRAMUSCULAR; INTRAVENOUS
Status: DISCONTINUED | OUTPATIENT
Start: 2024-08-27 | End: 2024-08-27

## 2024-08-27 RX ORDER — OXYCODONE HYDROCHLORIDE 5 MG/1
5 TABLET ORAL EVERY 4 HOURS PRN
Qty: 10 TABLET | Refills: 0 | Status: SHIPPED | OUTPATIENT
Start: 2024-08-27 | End: 2024-08-27

## 2024-08-27 RX ORDER — CEFAZOLIN SODIUM 2 G/50ML
2 SOLUTION INTRAVENOUS
Status: COMPLETED | OUTPATIENT
Start: 2024-08-27 | End: 2024-08-27

## 2024-08-27 RX ORDER — OXYCODONE HYDROCHLORIDE 5 MG/1
5 TABLET ORAL EVERY 4 HOURS PRN
Qty: 10 TABLET | Refills: 0 | Status: SHIPPED | OUTPATIENT
Start: 2024-08-27 | End: 2024-08-27 | Stop reason: SDUPTHER

## 2024-08-27 RX ORDER — ONDANSETRON 8 MG/1
8 TABLET, ORALLY DISINTEGRATING ORAL EVERY 8 HOURS PRN
Qty: 3 TABLET | Refills: 0 | Status: SHIPPED | OUTPATIENT
Start: 2024-08-27

## 2024-08-27 RX ORDER — ONDANSETRON 8 MG/1
8 TABLET, ORALLY DISINTEGRATING ORAL EVERY 8 HOURS PRN
Qty: 3 TABLET | Refills: 0 | Status: SHIPPED | OUTPATIENT
Start: 2024-08-27 | End: 2024-08-27

## 2024-08-27 RX ORDER — DEXAMETHASONE SODIUM PHOSPHATE 4 MG/ML
INJECTION, SOLUTION INTRA-ARTICULAR; INTRALESIONAL; INTRAMUSCULAR; INTRAVENOUS; SOFT TISSUE
Status: DISCONTINUED | OUTPATIENT
Start: 2024-08-27 | End: 2024-08-27

## 2024-08-27 RX ADMIN — PHENYLEPHRINE HYDROCHLORIDE 100 MCG: 10 INJECTION INTRAVENOUS at 11:08

## 2024-08-27 RX ADMIN — ONDANSETRON 4 MG: 2 INJECTION, SOLUTION INTRAMUSCULAR; INTRAVENOUS at 10:08

## 2024-08-27 RX ADMIN — SODIUM CHLORIDE, POTASSIUM CHLORIDE, SODIUM LACTATE AND CALCIUM CHLORIDE: 600; 310; 30; 20 INJECTION, SOLUTION INTRAVENOUS at 09:08

## 2024-08-27 RX ADMIN — PROPOFOL 120 MG: 10 INJECTION, EMULSION INTRAVENOUS at 10:08

## 2024-08-27 RX ADMIN — OXYCODONE 5 MG: 5 TABLET ORAL at 12:08

## 2024-08-27 RX ADMIN — MUPIROCIN: 20 OINTMENT TOPICAL at 09:08

## 2024-08-27 RX ADMIN — KETOROLAC TROMETHAMINE 30 MG: 30 INJECTION, SOLUTION INTRAMUSCULAR at 11:08

## 2024-08-27 RX ADMIN — LIDOCAINE HYDROCHLORIDE 100 MG: 20 INJECTION INTRAVENOUS at 10:08

## 2024-08-27 RX ADMIN — SODIUM CHLORIDE, POTASSIUM CHLORIDE, SODIUM LACTATE AND CALCIUM CHLORIDE: 600; 310; 30; 20 INJECTION, SOLUTION INTRAVENOUS at 11:08

## 2024-08-27 RX ADMIN — FENTANYL CITRATE 50 MCG: 50 INJECTION, SOLUTION INTRAMUSCULAR; INTRAVENOUS at 10:08

## 2024-08-27 RX ADMIN — GLYCOPYRROLATE 0.2 MG: 0.2 INJECTION, SOLUTION INTRAMUSCULAR; INTRAVENOUS at 10:08

## 2024-08-27 RX ADMIN — DEXAMETHASONE SODIUM PHOSPHATE 4 MG: 4 INJECTION, SOLUTION INTRAMUSCULAR; INTRAVENOUS at 10:08

## 2024-08-27 RX ADMIN — CEFAZOLIN SODIUM 2 G: 2 SOLUTION INTRAVENOUS at 10:08

## 2024-08-27 RX ADMIN — MIDAZOLAM HYDROCHLORIDE 1 MG: 1 INJECTION, SOLUTION INTRAMUSCULAR; INTRAVENOUS at 10:08

## 2024-08-27 NOTE — DISCHARGE SUMMARY
[facility] - Surgery  Discharge Note  Short Stay    Procedure(s) (LRB):  Right middle finger distal interphalangeal joint arthrodesis (Right)      OUTCOME: Patient tolerated treatment/procedure well without complication and is now ready for discharge.    DISPOSITION: Home or Self Care    FINAL DIAGNOSIS:  Degenerative arthritis of distal interphalangeal joint of middle finger of right hand    FOLLOWUP: In clinic    DISCHARGE INSTRUCTIONS:    Discharge Procedure Orders   SLING ORTHOPEDIC LARGE FOR HOME USE     Diet general     Activity as tolerated     Keep surgical extremity elevated     Lifting restrictions   Order Comments: Please do not lift or push off with the right arm or hand     Leave dressing on - Keep it clean, dry, and intact until clinic visit     Call MD for:  temperature >100.4     Call MD for:  persistent nausea and vomiting     Call MD for:  severe uncontrolled pain     Call MD for:  difficulty breathing, headache or visual disturbances     Call MD for:  redness, tenderness, or signs of infection (pain, swelling, redness, odor or green/yellow discharge around incision site)     Call MD for:  hives     Call MD for:  persistent dizziness or light-headedness     Call MD for:  extreme fatigue        TIME SPENT ON DISCHARGE: 15 minutes

## 2024-08-27 NOTE — ANESTHESIA PREPROCEDURE EVALUATION
08/27/2024  Keena Banks is a 69 y.o., female.      Pre-op Assessment    I have reviewed the Patient Summary Reports.     I have reviewed the Nursing Notes. I have reviewed the NPO Status.   I have reviewed the Medications.     Review of Systems  Anesthesia Hx:             Denies Family Hx of Anesthesia complications.    Denies Personal Hx of Anesthesia complications.                    Cardiovascular:     Hypertension           hyperlipidemia                             Hepatic/GI:      Liver Disease,            Musculoskeletal:  Arthritis               Neurological:  TIA,     Seizures, well controlled                                Psych:  Psychiatric History  depression                Physical Exam  General: Well nourished, Cooperative, Alert and Oriented    Airway:  Mallampati: II     Dental:  Caps / Implants    Chest/Lungs:  Normal Respiratory Rate    Heart:  Rate: Normal  Rhythm: Regular Rhythm        Anesthesia Plan  Type of Anesthesia, risks & benefits discussed:    Anesthesia Type: Gen Supraglottic Airway  Intra-op Monitoring Plan: Standard ASA Monitors  Post Op Pain Control Plan: multimodal analgesia  Induction:  IV  Airway Plan: , Post-Induction  Informed Consent: Informed consent signed with the Patient and all parties understand the risks and agree with anesthesia plan.  All questions answered.   ASA Score: 3    Ready For Surgery From Anesthesia Perspective.     .

## 2024-08-27 NOTE — ANESTHESIA PROCEDURE NOTES
LMA insertion    Date/Time: 8/27/2024 10:32 AM    Performed by: Loren Hinds CRNA  Authorized by: Stanley Angeles MD    Intubation:     Induction:  Intravenous    Intubated:  Postinduction    Mask Ventilation:  Not attempted    Attempts:  1    Attempted By:  CRNA    Difficult Airway Encountered?: No      Complications:  None    Airway Device:  Supraglottic airway/LMA    Airway Device Size:  3.0    Style/Cuff Inflation:  Cuffed (inflated to minimal occlusive pressure)    Inflation Amount (mL):  7    Secured at:  The lips    Placement Verified By:  Capnometry    Complicating Factors:  None    Findings Post-Intubation:  BS equal bilateral and atraumatic/condition of teeth unchanged

## 2024-08-27 NOTE — ANESTHESIA POSTPROCEDURE EVALUATION
Anesthesia Post Evaluation    Patient: Keena Banks    Procedure(s) Performed: Procedure(s) (LRB):  Right middle finger distal interphalangeal joint arthrodesis (Right)    Final Anesthesia Type: general      Patient location during evaluation: PACU  Patient participation: Yes- Able to Participate  Level of consciousness: awake and alert  Post-procedure vital signs: reviewed and stable  Pain management: adequate  Airway patency: patent    PONV status at discharge: No PONV  Anesthetic complications: no      Cardiovascular status: blood pressure returned to baseline  Respiratory status: unassisted  Hydration status: euvolemic  Follow-up not needed.              Vitals Value Taken Time   /82 08/27/24 1247   Temp   08/27/24 1409   Pulse 81 08/27/24 1247   Resp 16 08/27/24 1250   SpO2 96 % 08/27/24 1247         No case tracking events are documented in the log.      Pain/Anh Score: Pain Rating Prior to Med Admin: 6 (8/27/2024 12:50 PM)  Anh Score: 10 (8/27/2024 12:36 PM)

## 2024-08-27 NOTE — TRANSFER OF CARE
"Anesthesia Transfer of Care Note    Patient: Keena Banks    Procedure(s) Performed: Procedure(s) (LRB):  Right middle finger distal interphalangeal joint arthrodesis (Right)    Patient location: PACU    Anesthesia Type: general    Transport from OR: Transported from OR on 2-3 L/min O2 by NC with adequate spontaneous ventilation    Post pain: adequate analgesia    Post assessment: no apparent anesthetic complications and tolerated procedure well    Post vital signs: stable    Level of consciousness: sedated    Nausea/Vomiting: no nausea/vomiting    Complications: none    Transfer of care protocol was followed      Last vitals: Visit Vitals  BP (!) 161/81 (BP Location: Left arm, Patient Position: Sitting)   Pulse 71   Temp 36.9 °C (98.5 °F) (Skin)   Resp 15   Ht 5' 3" (1.6 m)   Wt 64.9 kg (143 lb)   SpO2 96%   Breastfeeding No   BMI 25.33 kg/m²     "

## 2024-08-27 NOTE — DISCHARGE INSTRUCTIONS
Procedure:  Right middle finger distal interphalangeal joint fusion    1. Keep the splint clean, dry, and in place until follow-up. Do not take it off and do not get it wet.    2. Please keep the right upper extremity elevated for the 1st 24-48 hours to prevent further swelling    3. Flexion and extension of the exposed fingers is allowed but do not attempt to lift or push off with the right arm or hand    4. Pain medication and nausea medication have been prescribed. Please take them as necessary.    5. If there are any questions or concerns please call Dr. Brown's office at [phone]    6. Follow-up with Dr. Brown in 2 weeks

## 2024-08-27 NOTE — OP NOTE
Keena Banks  1955    DATE OF SURGERY: 8/27/2024     PRE-OPERATIVE DIAGNOSIS:  Right middle finger distal interphalangeal joint arthritis    POST-OPERATIVE DIAGNOSIS:  Right middle finger distal interphalangeal joint arthritis     ANESTHESIA TYPE:  General    BLOOD LOSS:  Less than 10 cc    TOURNIQUET TIME:  Approximately 35 minutes    SURGEON: Dr Brown    ASSISTANT:  Ana Barrera    PROCEDURE:  Right middle finger distal interphalangeal joint arthrodesis    IMPLANTS:  Acumed micro AcuTrak screw x1     SPECIMENS:  None    INDICATION:     Ms. Banks has a history of severe arthritis to multiple fingers.  She had severe distal interphalangeal joint arthritis of the middle finger.  This was causing significant pain.  Due to the amount of pain she was having I discussed the possibility of arthrodesis of the joint.  Informed consent was then obtained    PROCEDURE IN DETAIL:     Ms. Banks was transported to the operating room and was placed supine on the operating room table. All appropriate points were padded. The right arm and hand was prepped and draped in the normal sterile fashion. Time out was called. The correct patient, correct operative site, correct procedure, antibiotic administration which consisted of 2 g of Ancef, and allergies to medications which are to Adhesive and Sulfa (sulfonamide antibiotics)  were reviewed. Time in was then called.     Attention was turned to the right middle finger.  A Y shaped incision was made directly over the distal interphalangeal joint.  The incision was carried through the skin.  Subcutaneous tissues were dissected with tenotomy scissors.  The extensor mechanism was identified.  The extensor mechanism was incised.  The joint was exposed.  There was severe degenerative changes throughout the joint.  Osteophytes were removed with rongeur.  The joint surfaces were cut back to flattened surfaces to allow for good positioning of the finger.  With the cuts performed the  finger was evaluated.  There was noted to be excellent positioning of the distal and proximal phalanxes with good contact across the distal interphalangeal joint.  At that point a guidewire for a micro AcuTrak screw was advanced in a retrograde fashion down the distal phalanx.  It was crossed into the middle phalanx there was noted to be centrally located on both the AP and lateral planes.  Good positioning of the wire the wire was measured.  A size 30 screw was selected.  The guidewire was over drilled.  The headless compression screw was then advanced over the guidewire.  This did provide very firm compression across the distal interphalangeal joint.  Rotation and alignment were confirmed.  The guidewire was then removed.  Any remaining osteophytes were removed.  The wound was copiously irrigated.  The tourniquet was let down and hemostasis was obtained.  The wound was then closed with 4-0 nylon suture superficially.  The wound was dressed with Xeroform, gauze padding, cast padding and a short-arm radial gutter splint was placed    The patient was awakened from anesthesia and was transported to the recovery room in stable condition. All lap, needle, sponge, and equipment counts were correct at the end of the case.    POST-OPERATIVE PLAN:     Patient will keep the splint in place full time for 2 weeks which time she will follow up with me.  Sutures will be removed at that time and I will place her into a finger splint

## 2024-08-27 NOTE — H&P
8/27/2024    Chief Complaint:  No chief complaint on file.      HPI:  Keena Banks is a 69 y.o. female, who presents to the surgery center today.  Has a history of right middle finger osteoarthritis of the distal interphalangeal joint.  She was here today to undergo a right middle finger distal interphalangeal joint arthrodesis.  She has no new complaints.    PMHX:  Past Medical History:   Diagnosis Date    Depression     anxiety and depression situational    Hypertension     Menopausal symptom     vaginal dryness and hot flashes    NHL (non-Hodgkin's lymphoma) 2013    s/p 8 cycles of CHOP/Rituxan    Osteoarthritis     both hands    Seasonal allergies     seasonal and animal    Seizures 2013    Started 11/2014 after treatment for NHL, MRI brain normal, Grand mal 10/2014 and 6/2014 started several months after chemo    Skin cancer 01/2020    TIA (transient ischemic attack) 05/09/2022    [facility]       PSHX:  Past Surgical History:   Procedure Laterality Date    BONE MARROW ASPIRATION      CARPAL TUNNEL RELEASE Right     CARPAL TUNNEL RELEASE      CATARACT EXTRACTION Bilateral 10/01/2022    oct left eye. nov right eye    CATARACT EXTRACTION, BILATERAL      COLONOSCOPY  09/15/2006    CATHLEEN.   One 2-3 mm polyp in the hepatic flexure.  HYPERPLASTIC POLYP.  Small internal hemorrhoids.    COLONOSCOPY N/A 08/10/2016    Procedure: COLONOSCOPY;  Surgeon: Patel Foster Jr., MD;  Location: [facility];  Service: Endoscopy;  Laterality: N/A;    HYSTERECTOMY      total, including ovaries secondary to endometriosis, fibroids    LYMPH NODE BIOPSY  01/24/2012    Left cervical lymph node    OOPHORECTOMY      PORTACATH PLACEMENT      portacath removal  2015    SALIVARY GLAND SURGERY Right     removed for large stone    TONSILLECTOMY      VAGINAL DELIVERY      times 2       FMHX:  Family History   Problem Relation Name Age of Onset    Cancer Father          cardiac    Alzheimer's disease Mother      Cancer Paternal Uncle  "twin-Father     No Known Problems Brother      No Known Problems Daughter      Alzheimer's disease Maternal Grandmother      Cancer Maternal Grandfather          prostate    Cancer Paternal Grandfather          lung    No Known Problems Daughter      Breast cancer Neg Hx         SOCHX:  Social History     Tobacco Use    Smoking status: Never    Smokeless tobacco: Never   Substance Use Topics    Alcohol use: No     Comment: Occasional, rare       ALLERGIES:  Adhesive and Sulfa (sulfonamide antibiotics)    CURRENT MEDICATIONS:  No current facility-administered medications on file prior to encounter.     Current Outpatient Medications on File Prior to Encounter   Medication Sig Dispense Refill    aspirin (ECOTRIN) 81 MG EC tablet Take 81 mg by mouth once daily.      calcium-vitamin D 250 mg-2.5 mcg (100 unit) per tablet Take by mouth 2 (two) times daily.      fluticasone propionate (FLONASE) 50 mcg/actuation nasal spray 2 sprays (100 mcg total) by Each Nostril route once daily. 16 g 6    lamotrigine XR (LAMICTAL XR) 200 mg TR24 XR tablet Take 2 tablets (400 mg total) by mouth once daily. 180 tablet 3    multivitamin with minerals tablet Take 1 tablet by mouth once daily.      rosuvastatin (CRESTOR) 20 MG tablet TAKE 1 TABLET BY MOUTH EVERY DAY 90 tablet 3    venlafaxine (EFFEXOR-XR) 150 MG Cp24 Take 1 capsule (150 mg total) by mouth once daily. 90 capsule 3    zonisamide (ZONEGRAN) 100 MG Cap TAKE 3 CAPSULES BY MOUTH EVERY EVENING 270 capsule 2    benzonatate (TESSALON) 200 MG capsule Take 1 capsule (200 mg total) by mouth 3 (three) times daily as needed for Cough. 30 capsule 0    guaiFENesin (MUCINEX) 600 mg 12 hr tablet Take 2 tablets (1,200 mg total) by mouth 2 (two) times daily as needed for Congestion. 30 tablet 0       REVIEW OF SYSTEMS:  ROS    GENERAL PHYSICAL EXAM:   BP (!) 161/81 (BP Location: Left arm, Patient Position: Sitting)   Pulse 71   Temp 98.5 °F (36.9 °C) (Skin)   Resp 15   Ht 5' 3" (1.6 m)   " Wt 64.9 kg (143 lb)   SpO2 96%   Breastfeeding No   BMI 25.33 kg/m²    GEN: well developed, well nourished, no acute distress   HENT: Normocephalic, atraumatic   EYES: No discharge, conjunctiva normal   NECK: Supple, non-tender   PULM: No wheezing, no respiratory distress   CV: RRR   ABD: Soft, non-tender    ORTHO EXAM:   Examination of the right hand middle finger reveals that there are severe degenerative changes throughout multiple fingers.  The middle finger is severely affected with severe angulation and osteophyte formation overlying the distal interphalangeal joint.  There is no major skin change.  Palpation over the joint does produce tenderness.  There was no increased warmth.  She does report grossly intact over the tip of the finger and capillary refill is less than 2 seconds.    RADIOLOGY:   X-rays of the right hand middle finger have been reviewed.  There was noted to be severe degenerative changes throughout multiple joints.  The worst of which is the right middle finger distal interphalangeal joint    ASSESSMENT:   Right middle finger distal interphalangeal joint arthritis    PLAN:  1. I have reviewed the risks and benefits of right middle finger distal interphalangeal joint arthrodesis with the patient.  After discussion of the risks and benefits informed consent has been confirmed    2.  Will proceed with right middle finger distal interphalangeal joint arthrodesis under general anesthesia     3.  She will follow up with me 2 weeks postoperatively

## 2024-09-10 DIAGNOSIS — I10 ESSENTIAL HYPERTENSION: ICD-10-CM

## 2024-09-10 DIAGNOSIS — I10 PRIMARY HYPERTENSION: ICD-10-CM

## 2024-09-10 RX ORDER — LOSARTAN POTASSIUM 50 MG/1
50 TABLET ORAL
Qty: 90 TABLET | Refills: 3 | OUTPATIENT
Start: 2024-09-10

## 2024-09-10 RX ORDER — LOSARTAN POTASSIUM 100 MG/1
100 TABLET ORAL DAILY
Qty: 90 TABLET | Refills: 3 | Status: SHIPPED | OUTPATIENT
Start: 2024-09-10 | End: 2025-09-10

## 2024-09-12 DIAGNOSIS — Z98.890 STATUS POST SURGERY: Primary | ICD-10-CM

## 2024-09-13 ENCOUNTER — HOSPITAL ENCOUNTER (OUTPATIENT)
Dept: RADIOLOGY | Facility: HOSPITAL | Age: 69
Discharge: HOME | End: 2024-09-13
Attending: ORTHOPAEDIC SURGERY
Payer: MEDICARE

## 2024-09-13 ENCOUNTER — OFFICE VISIT (OUTPATIENT)
Dept: ORTHOPEDICS | Facility: CLINIC | Age: 69
End: 2024-09-13
Payer: MEDICARE

## 2024-09-13 DIAGNOSIS — Z98.890 STATUS POST SURGERY: ICD-10-CM

## 2024-09-13 DIAGNOSIS — M62.81 MUSCLE WEAKNESS: Primary | ICD-10-CM

## 2024-09-13 PROCEDURE — 73140 X-RAY EXAM OF FINGER(S): CPT | Mod: 26,RT,, | Performed by: RADIOLOGY

## 2024-09-13 PROCEDURE — 73140 X-RAY EXAM OF FINGER(S): CPT | Mod: TC,PO,RT

## 2024-09-13 NOTE — PROGRESS NOTES
Ms Banks returns to clinic today.  She was 2 weeks status post right middle finger distal interphalangeal joint arthrodesis.  She was still having some soreness but is otherwise doing well     Physical exam: Examination of the right hand middle finger reveals that the incisions are healing well.  There is moderate edema.  There was no erythema or drainage.  She does have capillary refill less than 2 seconds at the tip     Radiology: X-rays of the right middle finger were taken in clinic today.  There was noted to be evidence of screw fixation across the distal interphalangeal joint.  This remains very well aligned and is healing appropriately     Assessment:  Right middle finger status post distal interphalangeal joint arthrodesis     Plan:     1. Sutures were removed today and Steri-Strips are placed     2.  She can begin washing the hand and finger     3. Will place her into a Velcro finger splint for the distal interphalangeal joint which he will wear full-time    4.  She will follow up in 3 weeks with repeat x-ray of the right middle finger

## 2024-09-20 ENCOUNTER — LAB VISIT (OUTPATIENT)
Dept: LAB | Facility: HOSPITAL | Age: 69
End: 2024-09-20
Attending: NURSE PRACTITIONER
Payer: MEDICARE

## 2024-09-20 DIAGNOSIS — Z85.72 HISTORY OF LYMPHOMA: ICD-10-CM

## 2024-09-20 LAB
ALBUMIN SERPL BCP-MCNC: 4.3 G/DL (ref 3.5–5.2)
ALP SERPL-CCNC: 108 U/L (ref 55–135)
ALT SERPL W/O P-5'-P-CCNC: 17 U/L (ref 10–44)
ANION GAP SERPL CALC-SCNC: 10 MMOL/L (ref 8–16)
AST SERPL-CCNC: 21 U/L (ref 10–40)
B2 MICROGLOB SERPL-MCNC: 1.8 UG/ML (ref 0–2.5)
BASOPHILS # BLD AUTO: 0.06 K/UL (ref 0–0.2)
BASOPHILS NFR BLD: 1.4 % (ref 0–1.9)
BILIRUB SERPL-MCNC: 0.3 MG/DL (ref 0.1–1)
BUN SERPL-MCNC: 19 MG/DL (ref 8–23)
CALCIUM SERPL-MCNC: 9.8 MG/DL (ref 8.7–10.5)
CHLORIDE SERPL-SCNC: 111 MMOL/L (ref 95–110)
CO2 SERPL-SCNC: 21 MMOL/L (ref 23–29)
CREAT SERPL-MCNC: 1 MG/DL (ref 0.5–1.4)
DIFFERENTIAL METHOD BLD: ABNORMAL
EOSINOPHIL # BLD AUTO: 0.2 K/UL (ref 0–0.5)
EOSINOPHIL NFR BLD: 3.9 % (ref 0–8)
ERYTHROCYTE [DISTWIDTH] IN BLOOD BY AUTOMATED COUNT: 12 % (ref 11.5–14.5)
EST. GFR  (NO RACE VARIABLE): >60 ML/MIN/1.73 M^2
GLUCOSE SERPL-MCNC: 97 MG/DL (ref 70–110)
HCT VFR BLD AUTO: 36 % (ref 37–48.5)
HGB BLD-MCNC: 12.4 G/DL (ref 12–16)
IMM GRANULOCYTES # BLD AUTO: 0.01 K/UL (ref 0–0.04)
IMM GRANULOCYTES NFR BLD AUTO: 0.2 % (ref 0–0.5)
LDH SERPL L TO P-CCNC: 185 U/L (ref 110–260)
LYMPHOCYTES # BLD AUTO: 1.3 K/UL (ref 1–4.8)
LYMPHOCYTES NFR BLD: 30.4 % (ref 18–48)
MCH RBC QN AUTO: 30.8 PG (ref 27–31)
MCHC RBC AUTO-ENTMCNC: 34.4 G/DL (ref 32–36)
MCV RBC AUTO: 90 FL (ref 82–98)
MONOCYTES # BLD AUTO: 0.3 K/UL (ref 0.3–1)
MONOCYTES NFR BLD: 8 % (ref 4–15)
NEUTROPHILS # BLD AUTO: 2.3 K/UL (ref 1.8–7.7)
NEUTROPHILS NFR BLD: 56.1 % (ref 38–73)
NRBC BLD-RTO: 0 /100 WBC
PLATELET # BLD AUTO: 253 K/UL (ref 150–450)
PMV BLD AUTO: 9.5 FL (ref 9.2–12.9)
POTASSIUM SERPL-SCNC: 3.7 MMOL/L (ref 3.5–5.1)
PROT SERPL-MCNC: 6.9 G/DL (ref 6–8.4)
RBC # BLD AUTO: 4.02 M/UL (ref 4–5.4)
SODIUM SERPL-SCNC: 142 MMOL/L (ref 136–145)
WBC # BLD AUTO: 4.14 K/UL (ref 3.9–12.7)

## 2024-09-20 PROCEDURE — 80053 COMPREHEN METABOLIC PANEL: CPT | Mod: PN | Performed by: NURSE PRACTITIONER

## 2024-09-20 PROCEDURE — 83615 LACTATE (LD) (LDH) ENZYME: CPT | Mod: PN | Performed by: NURSE PRACTITIONER

## 2024-09-20 PROCEDURE — 82232 ASSAY OF BETA-2 PROTEIN: CPT | Performed by: NURSE PRACTITIONER

## 2024-09-20 PROCEDURE — 36415 COLL VENOUS BLD VENIPUNCTURE: CPT | Mod: PN | Performed by: NURSE PRACTITIONER

## 2024-09-20 PROCEDURE — 85025 COMPLETE CBC W/AUTO DIFF WBC: CPT | Mod: PN | Performed by: NURSE PRACTITIONER

## 2024-09-27 ENCOUNTER — OFFICE VISIT (OUTPATIENT)
Dept: HEMATOLOGY/ONCOLOGY | Facility: CLINIC | Age: 69
End: 2024-09-27
Payer: MEDICARE

## 2024-09-27 VITALS
TEMPERATURE: 97 F | HEART RATE: 91 BPM | RESPIRATION RATE: 18 BRPM | WEIGHT: 142.44 LBS | OXYGEN SATURATION: 97 % | SYSTOLIC BLOOD PRESSURE: 118 MMHG | DIASTOLIC BLOOD PRESSURE: 61 MMHG | BODY MASS INDEX: 25.24 KG/M2 | HEIGHT: 63 IN

## 2024-09-27 DIAGNOSIS — Z85.72 HISTORY OF LYMPHOMA: Primary | ICD-10-CM

## 2024-09-27 PROCEDURE — 99999 PR PBB SHADOW E&M-EST. PATIENT-LVL IV: CPT | Mod: PBBFAC,,, | Performed by: NURSE PRACTITIONER

## 2024-09-27 NOTE — PROGRESS NOTES
Subjective:      Name: Keena Banks  : 1955  MRN: [id number]    CC:  Annual evaluation   HPI:   Keena Banks is a 69 y.o. female with HTN, Seizures, Skin cancer, TIA, OA, Depression presents to the clinic today for annual evaluation for hx of Stage IIIA diffuse large B-cell non-Hodgkin lymphoma that presented with adenopathy in the left supraclavicular region.  She was treated with 8 cycles of CHOP/Rituxan and went into complete remission.       Seizure when changing medication from Lamictal to Keppra in .  She has resumed Lamictal without incident. Last Seizure:  possibly related to infection.  TIA like in May/2022.  CT & MRI done; on ASA EC 81 mg daily.  Found to have a meningioma in the brain that has been stable (10 yrs old).     Today:  24  Ms. Banks presents to the clinic today for her 144-month (12 yr) post-therapy evaluation. Since last visit, she reports hand surgery (24), right (middle finger).  Recovered well.  She remains active and well.    She denies any recurrent illness, fevers, chills, drenching night sweats, painful lymphadenopathy, unexplained weight loss, rashes, pruritus, abdominal discomfort/bloating, nausea, vomiting, constipation, diarrhea, irregular heartbeat, chest pain, bleeding, etc.   No other new complaints or pertinent findings on 14-point review of systems.    Past Medical History:   Diagnosis Date    Depression     anxiety and depression situational    Hypertension     Menopausal symptom     vaginal dryness and hot flashes    NHL (non-Hodgkin's lymphoma)     s/p 8 cycles of CHOP/Rituxan    Osteoarthritis     both hands    Seasonal allergies     seasonal and animal    Seizures 2013    Started 2014 after treatment for NHL, MRI brain normal, Grand mal 10/2014 and 2014 started several months after chemo    Skin cancer 2020    TIA (transient ischemic attack) 2022    [facility]       Past Surgical History:   Procedure Laterality  Date    BONE MARROW ASPIRATION      CARPAL TUNNEL RELEASE Right     CARPAL TUNNEL RELEASE      CATARACT EXTRACTION Bilateral 10/01/2022    oct left eye. nov right eye    CATARACT EXTRACTION, BILATERAL      COLONOSCOPY  09/15/2006    CATHLEEN.   One 2-3 mm polyp in the hepatic flexure.  HYPERPLASTIC POLYP.  Small internal hemorrhoids.    COLONOSCOPY N/A 08/10/2016    Procedure: COLONOSCOPY;  Surgeon: Patel Foster Jr., MD;  Location: [facility] END[facility];  Service: Endoscopy;  Laterality: N/A;    FUSION, JOINT, FINGER Right 8/27/2024    Procedure: Right middle finger distal interphalangeal joint arthrodesis;  Surgeon: James Brown MD;  Location: [facility] OR;  Service: Orthopedics;  Laterality: Right;    HYSTERECTOMY      total, including ovaries secondary to endometriosis, fibroids    LYMPH NODE BIOPSY  01/24/2012    Left cervical lymph node    OOPHORECTOMY      PORTACATH PLACEMENT      portacath removal  2015    SALIVARY GLAND SURGERY Right     removed for large stone    TONSILLECTOMY      VAGINAL DELIVERY      times 2       Family History   Problem Relation Name Age of Onset    Cancer Father          cardiac    Alzheimer's disease Mother      Cancer Paternal Uncle twin-Father     No Known Problems Brother      No Known Problems Daughter      Alzheimer's disease Maternal Grandmother      Cancer Maternal Grandfather          prostate    Cancer Paternal Grandfather          lung    No Known Problems Daughter      Breast cancer Neg Hx         Social History     Socioeconomic History    Marital status:    Occupational History    Occupation: works for [employer]   Tobacco Use    Smoking status: Never    Smokeless tobacco: Never   Substance and Sexual Activity    Alcohol use: No     Comment: Occasional, rare    Drug use: No    Sexual activity: Yes     Partners: Male     Birth control/protection: None, See Surgical Hx     Comment: hysterectomy     Social Determinants of Health     Financial Resource Strain:  "Low Risk  (5/30/2024)    Overall Financial Resource Strain (CARDIA)     Difficulty of Paying Living Expenses: Not hard at all   Food Insecurity: No Food Insecurity (5/30/2024)    Hunger Vital Sign     Worried About Running Out of Food in the Last Year: Never true     Ran Out of Food in the Last Year: Never true   Transportation Needs: No Transportation Needs (5/30/2024)    PRAPARE - Transportation     Lack of Transportation (Medical): No     Lack of Transportation (Non-Medical): No   Physical Activity: Sufficiently Active (5/30/2024)    Exercise Vital Sign     Days of Exercise per Week: 3 days     Minutes of Exercise per Session: 60 min   Recent Concern: Physical Activity - Insufficiently Active (5/29/2024)    Exercise Vital Sign     Days of Exercise per Week: 3 days     Minutes of Exercise per Session: 20 min   Stress: No Stress Concern Present (5/30/2024)    [employer] - Occupational Stress Questionnaire     Feeling of Stress : Not at all   Housing Stability: Low Risk  (5/30/2024)    Housing Stability Vital Sign     Unable to Pay for Housing in the Last Year: No     Homeless in the Last Year: No       Review of patient's allergies indicates:   Allergen Reactions    Adhesive Rash    Sulfa (sulfonamide antibiotics) Rash       Review of Systems   All other systems reviewed and are negative.           Objective:   Weight:  Gain of 4 pounds in 1 year  Vitals:    09/27/24 1352   BP: 118/61   BP Location: Left arm   Patient Position: Sitting   BP Method: Medium (Automatic)   Pulse: 91   Resp: 18   Temp: 97.4 °F (36.3 °C)   TempSrc: Temporal   SpO2: 97%   Weight: 64.6 kg (142 lb 6.7 oz)   Height: 5' 3" (1.6 m)        Physical Exam  Vitals reviewed.   Constitutional:       General: She is not in acute distress.  HENT:      Head: Normocephalic and atraumatic.      Mouth/Throat:      Pharynx: Oropharynx is clear.   Eyes:      Conjunctiva/sclera: Conjunctivae normal.   Cardiovascular:      Rate " and Rhythm: Normal rate and regular rhythm.      Pulses: Normal pulses.   Pulmonary:      Effort: Pulmonary effort is normal. No respiratory distress.      Breath sounds: No wheezing.   Abdominal:      General: Bowel sounds are normal. There is no distension.      Palpations: Abdomen is soft.      Tenderness: There is no abdominal tenderness.   Musculoskeletal:      Cervical back: Neck supple.      Right lower leg: No edema.      Left lower leg: No edema.   Lymphadenopathy:      Cervical: No cervical adenopathy.      Upper Body:      Right upper body: No supraclavicular or axillary adenopathy.      Left upper body: No supraclavicular or axillary adenopathy.      Lower Body: No right inguinal adenopathy. No left inguinal adenopathy.   Skin:     General: Skin is warm and dry.      Findings: No rash.   Neurological:      Mental Status: She is alert and oriented to person, place, and time.   Psychiatric:         Behavior: Behavior normal.         Thought Content: Thought content normal.             Current Outpatient Medications on File Prior to Visit   Medication Sig    aspirin (ECOTRIN) 81 MG EC tablet Take 81 mg by mouth once daily.    calcium-vitamin D 250 mg-2.5 mcg (100 unit) per tablet Take by mouth 2 (two) times daily.    fluticasone propionate (FLONASE) 50 mcg/actuation nasal spray 2 sprays (100 mcg total) by Each Nostril route once daily.    lamotrigine XR (LAMICTAL XR) 200 mg TR24 XR tablet Take 2 tablets (400 mg total) by mouth once daily.    losartan (COZAAR) 100 MG tablet Take 1 tablet (100 mg total) by mouth once daily.    montelukast (SINGULAIR) 10 mg tablet TAKE 1 TABLET BY MOUTH EVERY DAY IN THE EVENING    multivitamin with minerals tablet Take 1 tablet by mouth once daily.    rosuvastatin (CRESTOR) 20 MG tablet TAKE 1 TABLET BY MOUTH EVERY DAY    venlafaxine (EFFEXOR-XR) 150 MG Cp24 Take 1 capsule (150 mg total) by mouth once daily.    zonisamide (ZONEGRAN) 100 MG Cap TAKE 3 CAPSULES BY MOUTH EVERY  EVENING    benzonatate (TESSALON) 200 MG capsule Take 1 capsule (200 mg total) by mouth 3 (three) times daily as needed for Cough. (Patient not taking: Reported on 9/27/2024)    guaiFENesin (MUCINEX) 600 mg 12 hr tablet Take 2 tablets (1,200 mg total) by mouth 2 (two) times daily as needed for Congestion. (Patient not taking: Reported on 9/27/2024)    [DISCONTINUED] ondansetron (ZOFRAN-ODT) 8 MG TbDL Take 1 tablet (8 mg total) by mouth every 8 (eight) hours as needed (Nausea). (Patient not taking: Reported on 9/27/2024)    [DISCONTINUED] oxyCODONE (ROXICODONE) 5 MG immediate release tablet Take 1 tablet (5 mg total) by mouth every 4 (four) hours as needed for Pain. (Patient not taking: Reported on 9/27/2024)     No current facility-administered medications on file prior to visit.       CBC:  Lab Results   Component Value Date    WBC 4.14 09/20/2024    HGB 12.4 09/20/2024    HCT 36.0 (L) 09/20/2024    MCV 90 09/20/2024     09/20/2024     CMP:  Sodium   Date Value Ref Range Status   09/20/2024 142 136 - 145 mmol/L Final     Potassium   Date Value Ref Range Status   09/20/2024 3.7 3.5 - 5.1 mmol/L Final     Chloride   Date Value Ref Range Status   09/20/2024 111 (H) 95 - 110 mmol/L Final     CO2   Date Value Ref Range Status   09/20/2024 21 (L) 23 - 29 mmol/L Final     Glucose   Date Value Ref Range Status   09/20/2024 97 70 - 110 mg/dL Final     BUN   Date Value Ref Range Status   09/20/2024 19 8 - 23 mg/dL Final     Creatinine   Date Value Ref Range Status   09/20/2024 1.0 0.5 - 1.4 mg/dL Final     Calcium   Date Value Ref Range Status   09/20/2024 9.8 8.7 - 10.5 mg/dL Final     Total Protein   Date Value Ref Range Status   09/20/2024 6.9 6.0 - 8.4 g/dL Final     Albumin   Date Value Ref Range Status   09/20/2024 4.3 3.5 - 5.2 g/dL Final     Total Bilirubin   Date Value Ref Range Status   09/20/2024 0.3 0.1 - 1.0 mg/dL Final     Comment:     For infants and newborns, interpretation of results should be  based  on gestational age, weight and in agreement with clinical  observations.    Premature Infant recommended reference ranges:  Up to 24 hours.............<8.0 mg/dL  Up to 48 hours............<12.0 mg/dL  3-5 days..................<15.0 mg/dL  6-29 days.................<15.0 mg/dL       Alkaline Phosphatase   Date Value Ref Range Status   09/20/2024 108 55 - 135 U/L Final     AST   Date Value Ref Range Status   09/20/2024 21 10 - 40 U/L Final     ALT   Date Value Ref Range Status   09/20/2024 17 10 - 44 U/L Final     Anion Gap   Date Value Ref Range Status   09/20/2024 10 8 - 16 mmol/L Final     eGFR if    Date Value Ref Range Status   06/06/2022 >60.0 >60 mL/min/1.73 m^2 Final     eGFR if non    Date Value Ref Range Status   06/06/2022 >60.0 >60 mL/min/1.73 m^2 Final     Comment:     Calculation used to obtain the estimated glomerular filtration  rate (eGFR) is the CKD-EPI equation.        Beta2:  1.8  LDH:  185    X-Ray Finger 2 or More Views Right  Narrative: EXAMINATION:  XR FINGER 2 OR MORE VIEWS RIGHT    CLINICAL HISTORY:  Other specified postprocedural states    TECHNIQUE:  Two more views right 3rd finger    COMPARISON:  07/08/2024    FINDINGS:  Or patient has a screw transfixing the distal interphalangeal joint of the 3rd finger alignment is satisfactory.  Degenerative changes seen at distal interphalangeal joints of the 2nd 3rd and 4th fingers.  Impression: See above    Electronically signed by: Griffin Duran MD  Date:    09/13/2024  Time:    08:48       All pertinent labs reviewed.    Assessment:       1. History of lymphoma    2.  Idiopathic seizure disorder - stable; follows with Neurology.  3.  Skin cancer - follows Dr. Mena         Plan:     History of lymphoma    F/U in 1 year with CBC, CMP, LDH, BETA2 prior.  Follow up with Specialists as needed.      ALVIN Clark, FNP-C  [facility]  20 minutes were spent in  coordination of patient's care, record review and counseling.

## 2024-10-03 DIAGNOSIS — M15.1 DEGENERATIVE ARTHRITIS OF DISTAL INTERPHALANGEAL JOINT OF MIDDLE FINGER OF RIGHT HAND: Primary | ICD-10-CM

## 2024-10-07 ENCOUNTER — HOSPITAL ENCOUNTER (OUTPATIENT)
Dept: RADIOLOGY | Facility: HOSPITAL | Age: 69
Discharge: HOME | End: 2024-10-07
Attending: ORTHOPAEDIC SURGERY
Payer: MEDICARE

## 2024-10-07 ENCOUNTER — OFFICE VISIT (OUTPATIENT)
Dept: ORTHOPEDICS | Facility: CLINIC | Age: 69
End: 2024-10-07
Payer: MEDICARE

## 2024-10-07 DIAGNOSIS — M15.1 DEGENERATIVE ARTHRITIS OF DISTAL INTERPHALANGEAL JOINT OF MIDDLE FINGER OF RIGHT HAND: ICD-10-CM

## 2024-10-07 DIAGNOSIS — M15.1 DEGENERATIVE ARTHRITIS OF DISTAL INTERPHALANGEAL JOINT OF MIDDLE FINGER OF RIGHT HAND: Primary | ICD-10-CM

## 2024-10-07 PROCEDURE — 1159F MED LIST DOCD IN RCRD: CPT | Mod: CPTII,S$GLB,, | Performed by: ORTHOPAEDIC SURGERY

## 2024-10-07 PROCEDURE — 73140 X-RAY EXAM OF FINGER(S): CPT | Mod: 26,RT,, | Performed by: RADIOLOGY

## 2024-10-07 PROCEDURE — 99024 POSTOP FOLLOW-UP VISIT: CPT | Mod: S$GLB,,, | Performed by: ORTHOPAEDIC SURGERY

## 2024-10-07 PROCEDURE — 99999 PR PBB SHADOW E&M-EST. PATIENT-LVL III: CPT | Mod: PBBFAC,,, | Performed by: ORTHOPAEDIC SURGERY

## 2024-10-07 PROCEDURE — 1126F AMNT PAIN NOTED NONE PRSNT: CPT | Mod: CPTII,S$GLB,, | Performed by: ORTHOPAEDIC SURGERY

## 2024-10-07 PROCEDURE — 3044F HG A1C LEVEL LT 7.0%: CPT | Mod: CPTII,S$GLB,, | Performed by: ORTHOPAEDIC SURGERY

## 2024-10-07 PROCEDURE — 3288F FALL RISK ASSESSMENT DOCD: CPT | Mod: CPTII,S$GLB,, | Performed by: ORTHOPAEDIC SURGERY

## 2024-10-07 PROCEDURE — 73140 X-RAY EXAM OF FINGER(S): CPT | Mod: TC,PO,RT

## 2024-10-07 PROCEDURE — 1101F PT FALLS ASSESS-DOCD LE1/YR: CPT | Mod: CPTII,S$GLB,, | Performed by: ORTHOPAEDIC SURGERY

## 2024-10-07 PROCEDURE — 4010F ACE/ARB THERAPY RXD/TAKEN: CPT | Mod: CPTII,S$GLB,, | Performed by: ORTHOPAEDIC SURGERY

## 2024-10-07 NOTE — PROGRESS NOTES
Ms Banks returns to clinic today.  Has a history of right middle finger distal interphalangeal joint arthrodesis.  He was 5-6 weeks postop.  He was overall doing well     Physical exam: Examination of the right finger reveals that the wound is now well healed.  There is still moderate edema.  There was no erythema or drainage.  She does have sensation intact at.      Radiology x-rays of the right middle finger were taken in clinic today.  He was noted have a well healed arthrodesis of the distal interphalangeal joint.  The screw fixation remains stable     Assessment: Right middle finger distal interphalangeal joint arthrodesis     Plan:     1. She will discontinue the brace except for heavier activity     2.  Will set her up with occupational therapy to begin edema control and range of motion of the MCP and PIP joints     3.  She will follow up in 4 weeks for repeat evaluation with an x-ray of the right finger

## 2024-10-09 ENCOUNTER — CLINICAL SUPPORT (OUTPATIENT)
Dept: REHABILITATION | Facility: HOSPITAL | Age: 69
End: 2024-10-09
Payer: MEDICARE

## 2024-10-09 DIAGNOSIS — M25.60 RANGE OF MOTION DEFICIT: Primary | ICD-10-CM

## 2024-10-09 DIAGNOSIS — M15.1 DEGENERATIVE ARTHRITIS OF DISTAL INTERPHALANGEAL JOINT OF MIDDLE FINGER OF RIGHT HAND: ICD-10-CM

## 2024-10-09 PROCEDURE — 97165 OT EVAL LOW COMPLEX 30 MIN: CPT | Mod: PN

## 2024-10-09 NOTE — PLAN OF CARE
[facility]  Occupational Therapy Initial Evaluation      Name: Keena Banks  Clinic Number: [id number]    Therapy Diagnosis:   Encounter Diagnoses   Name Primary?    Degenerative arthritis of distal interphalangeal joint of middle finger of right hand     Range of motion deficit Yes     Physician: James Brown MD    Physician Orders: Specific Treatment Requested: Please begin therapy to the right hand and middle finger.  Include edema control and range of motion of the MCP and PIP joints.  Please protect the distal interphalangeal joint arthrodesis      Frequency: 2-3 times per week      Duration:  4 weeks      Diagnosis: Right middle finger distal interphalangeal joint arthrodesis  Medical Diagnosis:   M15.1 (ICD-10-CM) - Degenerative arthritis of distal interphalangeal joint of middle finger of right hand     Surgical Procedure and Date: 8/27/2024, Right middle finger distal interphalangeal joint arthrodesis     Evaluation Date: 10/9/2024  Insurance Authorization Period Expiration: 10/7/2025  Plan of Care Certification Period: 10/9/2025 to 11/9/2024  Date of Return to MD: 11/4/2024  Visit # / Visits authorized: 1 / 1  FOTO: Intake Score/54    Precautions:  blood thinners and seizure disorder    Time In:4:05 pm  Time Out: 5:00 pm  Total Appointment Time (timed & untimed codes): 55 minutes    Subjective      Date of Onset: approximately 2 years ago    History of Current Condition/Mechanism of Injury: Keena reports: She has a history of arthritis in both hands, right worst than left and her right long finger became painful and had a lateral deformity over the past 2 years.    Falls: no    Involved Side: right  Dominant Side: Right    Mechanism of Injury: N/A  Surgical Procedure:  8/27/2024, Right middle finger distal interphalangeal joint arthrodesis     Imaging: X-ray 10/7/2024  Third DIP arthrodesis with the fixating screw identified.  The position alignment is satisfactory and  "unchanged as compared to the previous study        Prior Therapy: yes    Pain:  Functional Pain Scale Rating 0-10:   3/10 on average  1/10 at best  8/10 at worst  Location: right middle finger  Description: Aching, Dull, and Sharp  Aggravating Factors: Flexing  Easing Factors: hot bath and massage    Occupation:  retired  Working presently: home-maker  Duties: household duties    Functional Limitations/Social History:    Previous functional status includes: Independent with all ADLs.     Current Functional Status   Home/Living environment: lives with their spouse    - 1 story home, 1 step to enter    - DME: N/A      Limitation of Functional Status as follows:   ADLs/IADLs:     - Feeding: Independent    - Bathing: Independent    - Dressing/Grooming: Independent    - Home Management: Modified Independent    - Driving: Independent     Leisure: Gardening - Moderate assistance - Unable to use right hand    Patient's Goals for Therapy: "regain full use of right hand"    Past Medical History/Physical Systems Review:   Keena Banks  has a past medical history of Depression, Hypertension, Menopausal symptom, NHL (non-Hodgkin's lymphoma), Osteoarthritis, Seasonal allergies, Seizures, Skin cancer, and TIA (transient ischemic attack).    Keena Banks  has a past surgical history that includes Carpal tunnel release (Right); Lymph node biopsy (01/24/2012); Vaginal delivery; Tonsillectomy; Salivary gland surgery (Right); Hysterectomy; Colonoscopy (09/15/2006); Portacath placement; Bone marrow aspiration; Oophorectomy; portacath removal (2015); Carpal tunnel release; Colonoscopy (N/A, 08/10/2016); Cataract extraction, bilateral; Cataract extraction (Bilateral, 10/01/2022); and fusion, joint, finger (Right, 8/27/2024).    Keena has a current medication list which includes the following prescription(s): aspirin, benzonatate, calcium-vitamin d, fluticasone propionate, guaifenesin, lamotrigine xr, losartan, montelukast, multivitamin with " minerals, rosuvastatin, venlafaxine, and zonisamide.    Review of patient's allergies indicates:   Allergen Reactions    Adhesive Rash    Sulfa (sulfonamide antibiotics) Rash        Objective      Observation/Appearance:  Skin intact and scars are flat. Edema noted in right middle finger.    Edema. Measured in centimeters.   10/9/2024 10/9/2024    Left Right   Long:       P1 5.7 7.2    PIP 5.8 7.8   P2 5.3 6.5    DIP 5.5 6.2   P3 4.5 5.6     Hand AROM. Measured in degrees.   10/9/2024 10/9/2024    Left Right        Long: MP  0/86 0/78              PIP     0/106 0/77              DIP 0/75 Fused          Sensation:  Intact     Strength (Dyanmometer) and Pinch Strength (Pinch Gauge)  Measured in pounds and psi. Average of three trials.   10/9/2024 10/9/2024    Left Right   Rung II def def   Saxena Pinch def def   3pt Pinch def def   2pt Pinch def def         Intake Outcome Measure for FOTO Hand Survey    Therapist reviewed FOTO scores for Keena Banks on 10/9/2024.   FOTO report - see Media section or FOTO account episode details.    Intake Score: 54%       Treatment      Total Treatment time (time-based codes) separate from Evaluation: 8 minutes    Keena received the treatments listed below:      therapeutic exercises to develop endurance, ROM, and flexibility for 6 minutes including:  Jt blocking to PIP of right long finger, intrinsic plus position, flat fist and composite fist (no composite flexion for right long finger), finger abd/add and finger extension x 10 reps    manual therapy techniques: Soft tissue Mobilization were applied to the: right long finger for 2 minutes, including:  -RM and scar massage      supervised modalities after being cleared for contradictions: Paraffin with    hot pack for 10 minutes to right hand.      Patient Education and Home Exercises      Education provided:   -role of OT, goals for OT, scheduling/cancellations, insurance limitations with patient.  -Additional Education provided:  retrograde massage and issued Coban and digit sleeve XXL for edema management  -Wrapped pt's right long finger in Coban today for edema management    Written Home Exercises Provided: yes.  Exercises were reviewed and Keena was able to demonstrate them prior to the end of the session.    Keena demonstrated good  understanding of the education provided.     Pt was advised to perform these exercises free of pain, and to stop performing them if pain occurs.    See EMR under Patient Instructions for exercises provided 10/9/2024.    Assessment      Keena Banks is a 69 y.o. female referred to outpatient occupational therapy and presents with a medical diagnosis of   Degenerative arthritis of distal interphalangeal joint of middle finger of right hand s/p right middle finger distal interphalangeal joint arthrodesis on 8/27/2024.   Pt exhibits increased edema in right long finger, decreased strength, decreased range of motion and lack of functional use of her right hand.  Will advance with light activities next session.  Following medical record review it is determined that pt will benefit from occupational therapy services in order to maximize pain free and/or functional use of right hand. The following goals were discussed with the patient and patient is in agreement with them as to be addressed in the treatment plan. The patient's rehab potential is Excellent.     Anticipated barriers to occupational therapy: none    Plan of care discussed with patient: Yes  Patient's spiritual, cultural and educational needs considered and patient is agreeable to the plan of care and goals as stated below:     Medical Necessity is demonstrated by the following  Occupational Profile/History  Co-morbidities and personal factors that may impact the plan of care [x] LOW: Brief chart review  [] MODERATE: Expanded chart review   [] HIGH: Extensive chart review    Moderate / High Support Documentation: N/A     Examination  Performance deficits  relating to physical, cognitive or psychosocial skills that result in activity limitations and/or participation restrictions  [x] LOW: addressing 1-3 Performance deficits  [] MODERATE: 3-5 Performance deficits  [] HIGH: 5+ Performance deficits (please support below)    Moderate / High Support Documentation: N/A    Physical:  Joint Mobility  Joint Stability  Muscle Power/Strength  Muscle Endurance  Skin Integrity/Scar Formation  Edema   Strength  Pinch Strength  Fine Motor Coordination  Pain    Cognitive:  No Deficits    Psychosocial:    No Deficits     Treatment Options [x] LOW: Limited options  [] MODERATE: Several options  [] HIGH: Multiple options      Decision Making/ Complexity Score: low       The following goals were discussed with the patient and patient is in agreement with them as to be addressed in the treatment plan.     Goals:   Short Term (2 weeks on 10/894145):  1)   Patient to be IND with HEP and modalities for pain management.  2)   Increase AROM at least 10 degrees for right long finger MP and PIP  to increase functional hand use for grasping objects.  3)   Decrease edema .2-.3 cm to increase joint mobility /flexibility for improved overall functional hand use.   4)   Decrease complaints of pain to  2 out of 10 at worst to increase functional hand use for ADL/work/leisure activities.    Long Term (by discharge):  1)   Pt will report 0 out of 10 pain with right hand use.  2)   Patient to score at 67% or more on FOTO to demonstrate improved perception of functional right hand use.  3)   Pt will return to prior level of function for ADLs and household management.    Plan     Plan of Care Certification: 10/9/2024 to 11/9/2024.     Outpatient Occupational Therapy 2 times weekly for 4 weeks to include the following interventions: Paraffin, Fluidotherapy, Manual therapy/joint mobilizations, Modalities for pain management, Therapeutic exercises/activities., Strengthening, Edema Control, Scar  Management, and Joint Protection.    JADA Carney, CHT

## 2024-10-09 NOTE — PATIENT INSTRUCTIONS
"[facility] - OCCUPATIONAL THERAPY  HOME EXERCISE PROGRAM     Paraffin and moist heat x 10 minutes, 2 x daily    Complete the following massages for 2 minutes each, 2 x/day.                       Complete the following exercises with 10 repetitions each, 4 x/day.         AROM: Isolated PIP Flexion  Bend only middle joint of your finger, keeping other fingers straight with other hand.    AROM: Isolated MCP Flexion / Extension ("Wave")   Bend only your large, bottom knuckles. Hold 3 seconds. Keep the tips of your fingers straight. Straighten fingers.      AROM: MCP and PIP Flexion / Extension ("Straight Fist")  Bend your bottom and middle knuckles, keeping the tips of your fingers straight. Try to touch the pads of your fingers on your palm. Hold 3 seconds. Straighten your fingers.     AROM: Composite Flexion / Extension ("Full Fist")  Bend every joint in your hand into a fist except for right middle DIP joint. Hold 3 seconds. Straighten your fingers.     AROM: Composte Extension ("Finger Lifts")  Lift your finger off of the table one at a time. Hold 3 seconds. Relax your finger.    AROM: Abduction / Adduction  With hand flat on table, spread all fingers apart, then bring them together as close as possible.    Copyright © I. All rights reserved.     Therapist: JADA Souza CHT   "

## 2024-10-16 ENCOUNTER — CLINICAL SUPPORT (OUTPATIENT)
Dept: REHABILITATION | Facility: HOSPITAL | Age: 69
End: 2024-10-16
Payer: MEDICARE

## 2024-10-16 DIAGNOSIS — M25.60 RANGE OF MOTION DEFICIT: Primary | ICD-10-CM

## 2024-10-16 PROCEDURE — 97110 THERAPEUTIC EXERCISES: CPT | Mod: PN

## 2024-10-16 PROCEDURE — 97140 MANUAL THERAPY 1/> REGIONS: CPT | Mod: PN

## 2024-10-16 PROCEDURE — 97530 THERAPEUTIC ACTIVITIES: CPT | Mod: PN

## 2024-10-16 NOTE — PROGRESS NOTES
Occupational Therapy Daily Treatment Note     Name: Keena Banks  Clinic Number: [id number]    Therapy Diagnosis:   Encounter Diagnosis   Name Primary?    Range of motion deficit Yes     Physician: James Brown MD    Visit Date: 10/16/2024    Physician Orders: Specific Treatment Requested: Please begin therapy to the right hand and middle finger.  Include edema control and range of motion of the MCP and PIP joints.  Please protect the distal interphalangeal joint arthrodesis      Frequency: 2-3 times per week      Duration:  4 weeks      Diagnosis: Right middle finger distal interphalangeal joint arthrodesis  Medical Diagnosis:   M15.1 (ICD-10-CM) - Degenerative arthritis of distal interphalangeal joint of middle finger of right hand      Surgical Procedure and Date: 8/27/2024, Right middle finger distal interphalangeal joint arthrodesis     Evaluation Date: 10/9/2024  Insurance Authorization Period Expiration: 10/7/2025  Plan of Care Certification Period: 10/9/2025 to 11/9/2024  Onset Date: 2 years ago  Date of Return to MD: 11/4/2024  Visit # / Visits authorized: (1) +1 / 20   FOTO: Intake Score/54    Time In:10:30 am  Time Out: 11:30 am   Total Billable Time: 60 minutes    Precautions:  Standard      Subjective     Pt reports: She has been compliant with her exercises   she was compliant with home exercise program given last session.   Response to previous treatment:1st after   Functional change: 1st after     Pain: 2/10  Location: right hands      Objective       Observation/Appearance:  Skin intact and scars are flat. Edema noted in right middle finger.     Edema. Measured in centimeters.    10/9/2024 10/9/2024     Left Right   Long:         P1 5.7 7.2    PIP 5.8 7.8   P2 5.3 6.5    DIP 5.5 6.2   P3 4.5 5.6      Hand AROM. Measured in degrees.    10/9/2024 10/9/2024     Left Right           Long: MP  0/86 0/78              PIP     0/106 0/77              DIP 0/75 Fused              Sensation:  Intact       Strength (Dyanmometer) and Pinch Strength (Pinch Gauge)  Measured in pounds and psi. Average of three trials.    10/9/2024 10/9/2024     Left Right   Rung II def def   Saxena Pinch def def   3pt Pinch def def   2pt Pinch def def            Intake Outcome Measure for FOTO Hand Survey     Therapist reviewed FOTO scores for Keena Banks on 10/9/2024.   FOTO report - see Media section or FOTO account episode details.     Intake Score: 54%           Treatment       Keena received the following direct contact modalities after being cleared for contraindications for 10 minutes:   -paraffin wax and moist hot pack to increase extensibility and decrease stiffness    Keena received the following manual therapy techniques for 10 minutes:   -retrograde massage to digits and palm to decrease edema    Keena received therapeutic exercises for 15 minutes including:  -joint blocking to PIP of right long finger, intrinsic plus position, flat fist and composite fist (no composite flexion for right long finger), finger abd/add and finger extension x 10 reps    Keena participated in dynamic functional therapeutic activities to improve functional performance for 25  minutes, including:  - isospheres x 2 minutes  - nesting marbles picking up with just long finger and thumb x 2 trials  - light gripping with hand gripper 1 yellow band x 15  - lorelei peg board x 25 using long finger and thumb      Home Exercises and Education Provided     Education provided:   - continue to use coban/ digit sleeve for edema control  - don't bend the DIP of long finger   - Progress towards goals     Written Home Exercises Provided: Patient instructed to cont prior HEP.  Exercises were reviewed and Keena was able to demonstrate them prior to the end of the session.  Keena demonstrated good  understanding of the HEP provided.   .   See EMR under Patient Instructions for exercises provided prior visit.        Assessment     Pt would continue to benefit from skilled OT.  She is 7 weeks and 1 day post op right middle finger distal interphalangeal joint arthrodesis. She states that her range of motion continues to improve. She continues to have moderate swelling in her long finger. Edema reducing techniques were demonstrated to patient. Pt with good understanding. Pt motivated. Will progress as tolerated.    Keena is progressing well towards her goals and there are no updates to goals at this time. Pt prognosis is Excellent.     Pt will continue to benefit from skilled outpatient occupational therapy to address the deficits listed in the problem list on initial evaluation provide pt/family education and to maximize pt's level of independence in the home and community environment.     Anticipated barriers to occupational therapy: none at this time     Pt's spiritual, cultural and educational needs considered and pt agreeable to plan of care and goals.      Goals:   Short Term (2 weeks on 10/146033):  1)   Patient to be IND with HEP and modalities for pain management. Ongoing  2)   Increase AROM at least 10 degrees for right long finger MP and PIP  to increase functional hand use for grasping objects. Ongoing  3)   Decrease edema .2-.3 cm to increase joint mobility /flexibility for improved overall functional hand use. Ongoing  4)   Decrease complaints of pain to  2 out of 10 at worst to increase functional hand use for ADL/work/leisure activities.Ongoing     Long Term (by discharge):  1)   Pt will report 0 out of 10 pain with right hand use. Ongoing  2)   Patient to score at 67% or more on FOTO to demonstrate improved perception of functional right hand use.Ongoing  3)   Pt will return to prior level of function for ADLs and household management.Ongoing     Plan      Plan of Care Certification: 10/9/2024 to 11/9/2024.      Outpatient Occupational Therapy 2 times weekly for 4 weeks to include the following interventions: Paraffin, Fluidotherapy, Manual therapy/joint mobilizations,  Modalities for pain management, Therapeutic exercises/activities., Strengthening, Edema Control, Scar Management, and Joint Protection.     Updates/Grading for next session: Continue with current plan of care       Zoë Gregorio OT

## 2024-10-21 ENCOUNTER — CLINICAL SUPPORT (OUTPATIENT)
Dept: REHABILITATION | Facility: HOSPITAL | Age: 69
End: 2024-10-21
Payer: MEDICARE

## 2024-10-21 DIAGNOSIS — M25.60 RANGE OF MOTION DEFICIT: Primary | ICD-10-CM

## 2024-10-21 PROCEDURE — 97110 THERAPEUTIC EXERCISES: CPT | Mod: PN

## 2024-10-21 PROCEDURE — 97530 THERAPEUTIC ACTIVITIES: CPT | Mod: PN

## 2024-10-21 PROCEDURE — 97018 PARAFFIN BATH THERAPY: CPT | Mod: PN

## 2024-10-21 NOTE — PROGRESS NOTES
Occupational Therapy Daily Treatment Note     Name: Keena Banks  Clinic Number: [id number]    Therapy Diagnosis:   Encounter Diagnosis   Name Primary?    Range of motion deficit Yes     Physician: James Brown MD    Visit Date: 10/21/2024    Physician Orders: Specific Treatment Requested: Please begin therapy to the right hand and middle finger.  Include edema control and range of motion of the MCP and PIP joints.  Please protect the distal interphalangeal joint arthrodesis      Frequency: 2-3 times per week      Duration:  4 weeks      Diagnosis: Right middle finger distal interphalangeal joint arthrodesis  Medical Diagnosis:   M15.1 (ICD-10-CM) - Degenerative arthritis of distal interphalangeal joint of middle finger of right hand      Surgical Procedure and Date: 8/27/2024, Right middle finger distal interphalangeal joint arthrodesis     Evaluation Date: 10/9/2024  Insurance Authorization Period Expiration: 10/7/2025  Plan of Care Certification Period: 10/9/2025 to 11/9/2024  Onset Date: 2 years ago  Date of Return to MD: 11/4/2024  Visit # / Visits authorized: 3 / 13   FOTO: Intake Score/54    Time In:2:00 pm  Time Out: 3:00 pm   Total Billable Time: 60 minutes    Precautions:  Standard    S/p 8 weeks on 10/21/24    Subjective     Pt reports: has been using her hand more so it is a little tight and sore.   She has been compliant with her exercises   she was compliant with home exercise program given last session.   Response to previous treatment:1st after   Functional change: using hand to water garden     Pain: 2/10   Location: right hands      Objective       Observation/Appearance:  Skin intact and scars are flat. Edema noted in right middle finger.     Edema. Measured in centimeters.    10/9/2024 10/9/2024     Left Right   Long:         P1 5.7 7.2    PIP 5.8 7.8   P2 5.3 6.5    DIP 5.5 6.2   P3 4.5 5.6      Hand AROM. Measured in degrees.    10/9/2024 10/9/2024     Left Right           Long: MP  0/86  0/78              PIP     0/106 0/77              DIP 0/75 Fused              Sensation:  Intact      Strength (Dyanmometer) and Pinch Strength (Pinch Gauge)  Measured in pounds and psi. Average of three trials.    10/9/2024 10/9/2024     Left Right   Rung II def def   Saxena Pinch def def   3pt Pinch def def   2pt Pinch def def            Intake Outcome Measure for FOTO Hand Survey     Therapist reviewed FOTO scores for Keena Banks on 10/9/2024.   FOTO report - see Media section or FOTO account episode details.     Intake Score: 54%           Treatment       Keena received the following direct contact modalities after being cleared for contraindications for 10 minutes:   -paraffin wax and moist hot pack to increase extensibility and decrease stiffness    Keena received the following manual therapy techniques for 10 minutes:   -retrograde massage to digits and palm to decrease edema  -scar mob     Keena received therapeutic exercises for 15 minutes including:  -joint blocking to PIP of right long finger, intrinsic plus position, flat fist and composite fist (no composite flexion for right long finger), finger abd/add and finger extension x 10 reps    Keena participated in dynamic functional therapeutic activities to improve functional performance for 25  minutes, including:  - isospheres x 3 minutes  - nesting marbles picking up with just long finger and thumb x 2 trials  - light gripping with hand gripper 2 yellow band x 15  - lorelei peg board x 25 using long finger and thumb (NT)  -pencil walks and rotations 10x ea  -intrinsic + isometrics with pink sponges 30x  -yellow sponge squeezes 3 min        Home Exercises and Education Provided     Education provided:   - continue to use coban/ digit sleeve for edema control  - don't bend the DIP of long finger   - Progress towards goals     Written Home Exercises Provided: Patient instructed to cont prior HEP.  Exercises were reviewed and Keena was able to demonstrate them prior  to the end of the session.  Keena demonstrated good  understanding of the HEP provided.   .   See EMR under Patient Instructions for exercises provided prior visit.        Assessment     Pt would continue to benefit from skilled OT. She is 7 weeks and 1 day post op right middle finger distal interphalangeal joint arthrodesis. She states that her range of motion continues to improve. Swelling persists at DIPJ. Program progressed without complaint.   Keena is progressing well towards her goals and there are no updates to goals at this time. Pt prognosis is Excellent.     Pt will continue to benefit from skilled outpatient occupational therapy to address the deficits listed in the problem list on initial evaluation provide pt/family education and to maximize pt's level of independence in the home and community environment.     Anticipated barriers to occupational therapy: none at this time     Pt's spiritual, cultural and educational needs considered and pt agreeable to plan of care and goals.      Goals:   Short Term (2 weeks on 10/841081):  1)   Patient to be IND with HEP and modalities for pain management. Ongoing  2)   Increase AROM at least 10 degrees for right long finger MP and PIP  to increase functional hand use for grasping objects. Ongoing  3)   Decrease edema .2-.3 cm to increase joint mobility /flexibility for improved overall functional hand use. Ongoing  4)   Decrease complaints of pain to  2 out of 10 at worst to increase functional hand use for ADL/work/leisure activities.Ongoing     Long Term (by discharge):  1)   Pt will report 0 out of 10 pain with right hand use. Ongoing  2)   Patient to score at 67% or more on FOTO to demonstrate improved perception of functional right hand use.Ongoing  3)   Pt will return to prior level of function for ADLs and household management.Ongoing     Plan      Plan of Care Certification: 10/9/2024 to 11/9/2024.      Outpatient Occupational Therapy 2 times weekly for 4 weeks  to include the following interventions: Paraffin, Fluidotherapy, Manual therapy/joint mobilizations, Modalities for pain management, Therapeutic exercises/activities., Strengthening, Edema Control, Scar Management, and Joint Protection.     Updates/Grading for next session: Continue with current plan of care       JADA Osorio, JUSTOT

## 2024-10-23 ENCOUNTER — CLINICAL SUPPORT (OUTPATIENT)
Dept: REHABILITATION | Facility: HOSPITAL | Age: 69
End: 2024-10-23
Payer: MEDICARE

## 2024-10-23 DIAGNOSIS — M25.60 RANGE OF MOTION DEFICIT: Primary | ICD-10-CM

## 2024-10-23 PROCEDURE — 97110 THERAPEUTIC EXERCISES: CPT | Mod: PN

## 2024-10-23 PROCEDURE — 97018 PARAFFIN BATH THERAPY: CPT | Mod: PN

## 2024-10-23 PROCEDURE — 97530 THERAPEUTIC ACTIVITIES: CPT | Mod: PN

## 2024-10-23 NOTE — PROGRESS NOTES
Occupational Therapy Daily Treatment Note     Name: Keena Banks  Clinic Number: [id number]    Therapy Diagnosis:   Encounter Diagnosis   Name Primary?    Range of motion deficit Yes     Physician: James Brown MD    Visit Date: 10/23/2024    Physician Orders: Specific Treatment Requested: Please begin therapy to the right hand and middle finger.  Include edema control and range of motion of the MCP and PIP joints.  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   She has been compliant with her exercises f  she was compliant with home exercise program given last session.   Response to previous treatment:1st after   Functional change: Pain: 2/10   Location: right hands      Objective     R MF AROM measured this date     Observation/Appearance:  Skin intact and scars are flat. Edema noted in right middle finger.     Edema. Measured in centimeters.    10/9/2024 10/9/2024     Left Right   Long:         P1 5.7 7.2    PIP 5.8 7.8   P2 5.3 6.5    DIP 5.5 6.2   P3 4.5 5.6      Hand AROM. Measured in degrees.    10/9/2024 10/9/2024 10/23/24     Left Right             Long: MP  0/86 0/78 0/80              PIP     0/106  0/77 0/80              DIP 0/75 Fused Fused               Sensation:  Intact      Strength (Dyanmometer) and Pinch Strength (Pinch Gauge)  Measured in pounds and psi. Average of three trials.    10/9/2024 10/9/2024     Left Right   Rung II def def   Saxena Pinch def def   3pt Pinch def def   2pt Pinch def def            Intake Outcome Measure for FOTO Hand Survey     Therapist reviewed FOTO scores for Keena Banks on 10/9/2024.   FOTO report - see Media section or FOTO account episode details.     Intake Score: 54%           Treatment       Keena received the following direct contact modalities after being cleared for contraindications for 10 minutes:   -paraffin wax and moist hot pack to increase extensibility and decrease stiffness    Keena received the following manual therapy techniques for 10 minutes:   -retrograde massage to digits and palm to decrease edema  -scar mob     Keena received therapeutic exercises for 15 minutes including:  -joint blocking to PIP of right long finger, intrinsic plus position, flat fist and composite fist (no composite flexion for right long finger), finger abd/add and finger extension x 10 reps     Keena participated in dynamic functional therapeutic activities to improve functional performance for 25  minutes, including:  - isospheres x 3 minutes  - nesting marbles picking up with just long finger and thumb x 2 trials  - light gripping with hand gripper 2 yellow band x 15x  - lorelei peg board x 25 using long finger and thumb   -pencil walks and rotations 10x ea (NT)  -intrinsic + isometrics with pink sponges 30x  -yellow sponge squeezes 3 min        Home Exercises and Education Provided     Education provided:   - continue to use coban/ digit sleeve for edema control  - don't bend the DIP of long finger   - Progress towards goals     Written Home Exercises Provided: Patient instructed to cont prior HEP.  Exercises were reviewed and Keena was able to demonstrate them prior to the end of the  session.  Keena demonstrated good  understanding of the HEP provided.   .   See EMR under Patient Instructions for exercises provided prior visit.        Assessment     Pt would continue to benefit from skilled OT. She is 7 weeks and 1 day post op right middle finger distal interphalangeal joint arthrodesis. SUAREZ increased 5 degrees at R MF.    Keena is progressing well towards her goals and there are no updates to goals at this time. Pt prognosis is Excellent.     Pt will continue to benefit from skilled outpatient occupational therapy to address the deficits listed in the problem list on initial evaluation provide pt/family education and to maximize pt's level of independence in the home and community environment.     Anticipated barriers to occupational therapy: none at this time     Pt's spiritual, cultural and educational needs considered and pt agreeable to plan of care and goals.      Goals:   Short Term (2 weeks on [id number]):  1)   Patient to be IND with HEP and modalities for pain management. Ongoing  2)   Increase AROM at least 10 degrees for right long finger MP and PIP  to increase functional hand use for grasping objects. Ongoing  3)   Decrease edema .2-.3 cm to increase joint mobility /flexibility for improved overall functional hand use. Ongoing  4)   Decrease complaints of pain to  2 out of 10 at worst to increase functional hand use for ADL/work/leisure activities.Ongoing     Long Term (by discharge):  1)   Pt will report 0 out of 10 pain with right hand use. Ongoing  2)   Patient to score at 67% or more on FOTO to demonstrate improved perception of functional right hand use.Ongoing  3)   Pt will return to prior level of function for ADLs and household management.Ongoing     Plan      Plan of Care Certification: 10/9/2024 to 11/9/2024.      Outpatient Occupational Therapy 2 times weekly for 4 weeks to include the following interventions: Paraffin, Fluidotherapy, Manual therapy/joint mobilizations,  Modalities for pain management, Therapeutic exercises/activities., Strengthening, Edema Control, Scar Management, and Joint Protection.     Updates/Grading for next session: Continue with current plan of care       JADA Osorio CHT

## 2024-10-29 ENCOUNTER — CLINICAL SUPPORT (OUTPATIENT)
Dept: REHABILITATION | Facility: HOSPITAL | Age: 69
End: 2024-10-29
Payer: MEDICARE

## 2024-10-29 DIAGNOSIS — M25.60 RANGE OF MOTION DEFICIT: Primary | ICD-10-CM

## 2024-10-29 PROCEDURE — 97530 THERAPEUTIC ACTIVITIES: CPT | Mod: PN

## 2024-10-29 PROCEDURE — 97110 THERAPEUTIC EXERCISES: CPT | Mod: PN

## 2024-10-31 ENCOUNTER — CLINICAL SUPPORT (OUTPATIENT)
Dept: REHABILITATION | Facility: HOSPITAL | Age: 69
End: 2024-10-31
Payer: MEDICARE

## 2024-10-31 DIAGNOSIS — M19.049 HAND ARTHRITIS: Primary | ICD-10-CM

## 2024-10-31 DIAGNOSIS — M25.60 RANGE OF MOTION DEFICIT: Primary | ICD-10-CM

## 2024-10-31 PROCEDURE — 97530 THERAPEUTIC ACTIVITIES: CPT | Mod: PN

## 2024-11-04 ENCOUNTER — HOSPITAL ENCOUNTER (OUTPATIENT)
Dept: RADIOLOGY | Facility: HOSPITAL | Age: 69
Discharge: HOME | End: 2024-11-04
Attending: ORTHOPAEDIC SURGERY
Payer: MEDICARE

## 2024-11-04 ENCOUNTER — OFFICE VISIT (OUTPATIENT)
Dept: ORTHOPEDICS | Facility: CLINIC | Age: 69
End: 2024-11-04
Payer: MEDICARE

## 2024-11-04 VITALS — BODY MASS INDEX: 25.24 KG/M2 | WEIGHT: 142.44 LBS | HEIGHT: 63 IN

## 2024-11-04 DIAGNOSIS — M15.1 DEGENERATIVE ARTHRITIS OF DISTAL INTERPHALANGEAL JOINT OF MIDDLE FINGER OF RIGHT HAND: Primary | ICD-10-CM

## 2024-11-04 DIAGNOSIS — M19.049 HAND ARTHRITIS: ICD-10-CM

## 2024-11-04 PROCEDURE — 1159F MED LIST DOCD IN RCRD: CPT | Mod: CPTII,S$GLB,, | Performed by: ORTHOPAEDIC SURGERY

## 2024-11-04 PROCEDURE — 99024 POSTOP FOLLOW-UP VISIT: CPT | Mod: S$GLB,,, | Performed by: ORTHOPAEDIC SURGERY

## 2024-11-04 PROCEDURE — 1101F PT FALLS ASSESS-DOCD LE1/YR: CPT | Mod: CPTII,S$GLB,, | Performed by: ORTHOPAEDIC SURGERY

## 2024-11-04 PROCEDURE — 73140 X-RAY EXAM OF FINGER(S): CPT | Mod: TC,PO,RT

## 2024-11-04 PROCEDURE — 3044F HG A1C LEVEL LT 7.0%: CPT | Mod: CPTII,S$GLB,, | Performed by: ORTHOPAEDIC SURGERY

## 2024-11-04 PROCEDURE — 99999 PR PBB SHADOW E&M-EST. PATIENT-LVL III: CPT | Mod: PBBFAC,,, | Performed by: ORTHOPAEDIC SURGERY

## 2024-11-04 PROCEDURE — 73140 X-RAY EXAM OF FINGER(S): CPT | Mod: 26,RT,, | Performed by: RADIOLOGY

## 2024-11-04 PROCEDURE — 4010F ACE/ARB THERAPY RXD/TAKEN: CPT | Mod: CPTII,S$GLB,, | Performed by: ORTHOPAEDIC SURGERY

## 2024-11-04 PROCEDURE — 3288F FALL RISK ASSESSMENT DOCD: CPT | Mod: CPTII,S$GLB,, | Performed by: ORTHOPAEDIC SURGERY

## 2024-11-04 PROCEDURE — 1125F AMNT PAIN NOTED PAIN PRSNT: CPT | Mod: CPTII,S$GLB,, | Performed by: ORTHOPAEDIC SURGERY

## 2024-11-04 NOTE — PROGRESS NOTES
Ms aBnks returns to clinic she has a history of right middle finger distal interphalangeal joint arthrodesis.  She was doing relatively well.  She was still working with therapy.  She feels as if she continues to improve.      Physical exam: Examination of the right middle finger reveals that the incision is well healed.  There is only mild edema.  There is no erythema.  She does have a small nail deformity as and there was growing out.  There was no significant tenderness.  She does have sensation intact at the tip     Radiology: X-rays of the right middle finger were taken in clinic today.  There was noted to be evidence of the arthrodesis of the distal interphalangeal joint.  The arthrodesis is well healed and the screw remains well fixed     Assessment:  Status post right middle finger distal interphalangeal joint arthrodesis     Plan:     1.  She can increase her activity as tolerated     2.  There was no further need for splinting    3.  She can continue to work with therapy until she feels like she was reached a plateau     4.  She will follow up with me as needed

## 2024-11-07 ENCOUNTER — CLINICAL SUPPORT (OUTPATIENT)
Dept: REHABILITATION | Facility: HOSPITAL | Age: 69
End: 2024-11-07
Payer: MEDICARE

## 2024-11-07 DIAGNOSIS — M25.60 RANGE OF MOTION DEFICIT: Primary | ICD-10-CM

## 2024-11-07 PROCEDURE — 97530 THERAPEUTIC ACTIVITIES: CPT | Mod: PN

## 2024-11-07 NOTE — PATIENT INSTRUCTIONS
[facility]  OCCUPATIONAL THERAPY  HOME EXERCISE PROGRAM     Complete the following strengthening program 1x/day.                       _                       JADA Souza, FIDE  Certified Hand Therapist  Occupational Therapist

## 2024-11-07 NOTE — PROGRESS NOTES
Occupational Therapy Updated Plan of Care Note     Name: Keena Banks  Clinic Number: [id number]    Therapy Diagnosis:   Encounter Diagnosis   Name Primary?    Range of motion deficit Yes     Physician: James Brown MD    Visit Date: 11/7/2024    Physician Orders: Cont OT. 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   she was compliant with home exercise program given last session.   Response to previous treatment: decreased edema with using the Coban on her finger  Functional change: using for all light ADLs  Pain: 1/10   Location: right hand    Objective       Observation/Appearance:  Skin intact and scars are flat. Edema noted in right middle finger.     Edema. Measured in centimeters.    10/9/2024 10/9/2024 10/29/2024 10/31/2024     Left Right Right Right   Long:           P1 5.7 7.2 6.9 6.7    PIP 5.8 7.8 7.6 7.0   P2 5.3 6.5 6.4 6.0    DIP 5.5 6.2 5.8 5.8   P3 4.5 5.6 5.2 5.2      Hand AROM. Measured in degrees.    10/9/2024 10/9/2024 10/23/24 10/31/24     Left Right Right Right             Long: MP  0/86 0/78 0/80 0/80              PIP     0/106 0/77 0/80 0/80              DIP 0/75 Fused Fused Fused                Sensation:  Intact      Strength (Dyanmometer) and Pinch Strength (Pinch Gauge)  Measured in pounds and psi. Average of  three trials.    11/7/2024 11/7/2024     Left Right   Rung II 37 30   Key Pinch 8 12   3pt Pinch 7 8              Treatment       Keena received the following direct contact modalities after being cleared for contraindications for 10 minutes:   -paraffin wax and moist hot pack to increase extensibility and decrease stiffness    Keena received the following manual therapy techniques for 5 minutes:   -retrograde massage to digits and palm to decrease edema  -scar mob     Keena received therapeutic exercises for 10 minutes including:  -joint blocking to PIP of right long finger, intrinsic plus position, flat fist and composite fist (no composite flexion for right long finger), finger abd/add and finger extension x 10 reps     Keena participated in dynamic functional therapeutic activities to improve functional performance for 20  minutes, including:  - nesting marbles picking up with just long finger and thumb x 2 trials  - light gripping with hand gripper 2 yellow band x 2 minutes  -red band with hand gripper x2'  -yellow putty: 2' molding, 2' grasping, 3 logs each 3 pt and key  -red cp with pink sponges x 3 trials  -yellow pw for grasping x 2'  -yellow df for grasping x 2'        Home Exercises and Education Provided     Education provided:   - continue to use coban/ digit sleeve for edema control  - don't bend the DIP of long finger   - Progress towards goals: Excellent    Written Home Exercises Provided: Patient instructed to cont prior HEP.  Exercises were reviewed and Keena was able to demonstrate them prior to the end of the session.  Keena demonstrated good  understanding of the HEP provided.   .   See EMR under Patient Instructions for exercises provided prior visit.        Assessment   Pt states that she was compliant with using her Coban and digit sleeve for edema control for her right long finger and edema measurements have improved. She is progressing very well with her motion and is using her right hand for light  ADLs with no difficulty. Measured /pinch strength today. Advanced light strengthening activities with no c/o pain. Pt is progressing very well.    Pt would continue to benefit from skilled OT.   Keena is progressing well towards her goals and there are no updates to goals at this time. Pt prognosis is Excellent.     Pt will continue to benefit from skilled outpatient occupational therapy to address the deficits listed in the problem list on initial evaluation provide pt/family education and to maximize pt's level of independence in the home and community environment.     Anticipated barriers to occupational therapy: none at this time     Pt's spiritual, cultural and educational needs considered and pt agreeable to plan of care and goals.      Goals:   Short Term (2 weeks on 10/[id number]):  1)   Patient to be IND with HEP and modalities for pain management. Met 10/29/2024  2)   Increase AROM at least 10 degrees for right long finger MP and PIP  to increase functional hand use for grasping objects. Ongoing  3)   Decrease edema .2-.3 cm to increase joint mobility /flexibility for improved overall functional hand use. Met 10/29/2024  4)   Decrease complaints of pain to  2 out of 10 at worst to increase functional hand use for ADL/work/leisure activities. - Met 10/29/2024     Long Term (by discharge):  1)   Pt will report 0 out of 10 pain with right hand use. Ongoing  2)   Patient to score at 67% or more on FOTO to demonstrate improved perception of functional right hand use.- Met 10/29/2024  3)   Pt will return to prior level of function for ADLs and household management.Ongoing     Plan:       Plan of Care Certification: 11/7/2024 to 12/7/2024.      Outpatient Occupational Therapy 2 times weekly for 4 weeks to include the following interventions: Paraffin, Fluidotherapy, Manual therapy/joint mobilizations, Modalities for pain management, Therapeutic exercises/activities., Strengthening, Edema Control, Scar Management,  and Joint Protection.     Updates/Grading for next session: Advance as tolerated.      JADA Souza, CHT

## 2024-11-08 NOTE — PLAN OF CARE
Occupational Therapy Updated Plan of Care Note     Name: Keena Banks  Clinic Number: [id number]    Therapy Diagnosis:   Encounter Diagnosis   Name Primary?    Range of motion deficit Yes     Physician: James Brown MD    Visit Date: 11/7/2024    Physician Orders: Cont OT. 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   she was compliant with home exercise program given last session.   Response to previous treatment: decreased edema with using the Coban on her finger  Functional change: using for all light ADLs  Pain: 1/10   Location: right hand    Objective       Observation/Appearance:  Skin intact and scars are flat. Edema noted in right middle finger.     Edema. Measured in centimeters.    10/9/2024 10/9/2024 10/29/2024 10/31/2024     Left Right Right Right   Long:           P1 5.7 7.2 6.9 6.7    PIP 5.8 7.8 7.6 7.0   P2 5.3 6.5 6.4 6.0    DIP 5.5 6.2 5.8 5.8   P3 4.5 5.6 5.2 5.2      Hand AROM. Measured in degrees.    10/9/2024 10/9/2024 10/23/24 10/31/24     Left Right Right Right             Long: MP  0/86 0/78 0/80 0/80              PIP     0/106 0/77 0/80 0/80              DIP 0/75 Fused Fused Fused                Sensation:  Intact      Strength (Dyanmometer) and Pinch Strength (Pinch Gauge)  Measured in pounds and psi. Average of  three trials.    11/7/2024 11/7/2024     Left Right   Rung II 37 30   Key Pinch 8 12   3pt Pinch 7 8              Treatment       Keena received the following direct contact modalities after being cleared for contraindications for 10 minutes:   -paraffin wax and moist hot pack to increase extensibility and decrease stiffness    Keena received the following manual therapy techniques for 5 minutes:   -retrograde massage to digits and palm to decrease edema  -scar mob     Keena received therapeutic exercises for 10 minutes including:  -joint blocking to PIP of right long finger, intrinsic plus position, flat fist and composite fist (no composite flexion for right long finger), finger abd/add and finger extension x 10 reps     Keena participated in dynamic functional therapeutic activities to improve functional performance for 20  minutes, including:  - nesting marbles picking up with just long finger and thumb x 2 trials  - light gripping with hand gripper 2 yellow band x 2 minutes  -red band with hand gripper x2'  -yellow putty: 2' molding, 2' grasping, 3 logs each 3 pt and key  -red cp with pink sponges x 3 trials  -yellow pw for grasping x 2'  -yellow df for grasping x 2'        Home Exercises and Education Provided     Education provided:   - continue to use coban/ digit sleeve for edema control  - don't bend the DIP of long finger   - Progress towards goals: Excellent    Written Home Exercises Provided: Patient instructed to cont prior HEP.  Exercises were reviewed and Keena was able to demonstrate them prior to the end of the session.  Keena demonstrated good  understanding of the HEP provided.   .   See EMR under Patient Instructions for exercises provided prior visit.        Assessment   Pt states that she was compliant with using her Coban and digit sleeve for edema control for her right long finger and edema measurements have improved. She is progressing very well with her motion and is using her right hand for light  ADLs with no difficulty. Measured /pinch strength today. Advanced light strengthening activities with no c/o pain. Pt is progressing very well.    Pt would continue to benefit from skilled OT.   Keena is progressing well towards her goals and there are no updates to goals at this time. Pt prognosis is Excellent.     Pt will continue to benefit from skilled outpatient occupational therapy to address the deficits listed in the problem list on initial evaluation provide pt/family education and to maximize pt's level of independence in the home and community environment.     Anticipated barriers to occupational therapy: none at this time     Pt's spiritual, cultural and educational needs considered and pt agreeable to plan of care and goals.      Goals:   Short Term (2 weeks on 10/[id number]):  1)   Patient to be IND with HEP and modalities for pain management. Met 10/29/2024  2)   Increase AROM at least 10 degrees for right long finger MP and PIP  to increase functional hand use for grasping objects. Ongoing  3)   Decrease edema .2-.3 cm to increase joint mobility /flexibility for improved overall functional hand use. Met 10/29/2024  4)   Decrease complaints of pain to  2 out of 10 at worst to increase functional hand use for ADL/work/leisure activities. - Met 10/29/2024     Long Term (by discharge):  1)   Pt will report 0 out of 10 pain with right hand use. Ongoing  2)   Patient to score at 67% or more on FOTO to demonstrate improved perception of functional right hand use.- Met 10/29/2024  3)   Pt will return to prior level of function for ADLs and household management.Ongoing     Plan:       Plan of Care Certification: 11/7/2024 to 12/7/2024.      Outpatient Occupational Therapy 2 times weekly for 4 weeks to include the following interventions: Paraffin, Fluidotherapy, Manual therapy/joint mobilizations, Modalities for pain management, Therapeutic exercises/activities., Strengthening, Edema Control, Scar Management,  and Joint Protection.     Updates/Grading for next session: Advance as tolerated.      JAAD Souza, CHT

## 2024-11-11 ENCOUNTER — CLINICAL SUPPORT (OUTPATIENT)
Dept: REHABILITATION | Facility: HOSPITAL | Age: 69
End: 2024-11-11
Payer: MEDICARE

## 2024-11-11 DIAGNOSIS — M25.60 RANGE OF MOTION DEFICIT: Primary | ICD-10-CM

## 2024-11-11 PROCEDURE — 97530 THERAPEUTIC ACTIVITIES: CPT | Mod: PN

## 2024-11-11 PROCEDURE — 97110 THERAPEUTIC EXERCISES: CPT | Mod: PN

## 2024-11-11 NOTE — PROGRESS NOTES
Occupational Therapy Updated Plan of Care Note     Name: Keena Banks  Clinic Number: [id number]    Therapy Diagnosis:   Encounter Diagnosis   Name Primary?    Range of motion deficit Yes     Physician: James Brown MD    Visit Date: 11/11/2024    Physician Orders: Cont OT. May begin strengthening  Medical Diagnosis:   M15.1 (ICD-10-CM) - Degenerative arthritis of distal interphalangeal joint of middle finger of right hand      Surgical Procedure and Date: 8/27/2024, Right middle finger distal interphalangeal joint arthrodesis     Evaluation Date: 10/9/2024  Insurance Authorization Period Expiration: 10/7/2025  Plan of Care Certification Period: 11/7/2024 to 12/7/2024  Onset Date: 2 years ago  Date of Return to MD: none scheduled  Visit # / Visits authorized: 8/ 13   FOTO: Intake Score/54 Score 2/67    Time In: 1:00 pm  Time Out: 1:45 pm  Total Billable Time: 45 minutes    Precautions:  Standard    S/p 11 weeks and 4 days on 11/11/24    Subjective     Pt reports: no new complaints.   she was compliant with home exercise program given last session.   Response to previous treatment: decreased pain in   Functional change: using for all light ADLs  Pain: 1/10   Location: right hand    Objective       Observation/Appearance:  Skin intact and scars are flat. Edema noted in right middle finger.     Edema. Measured in centimeters.    10/9/2024 10/9/2024 10/29/2024 10/31/2024     Left Right Right Right   Long:           P1 5.7 7.2 6.9 6.7    PIP 5.8 7.8 7.6 7.0   P2 5.3 6.5 6.4 6.0    DIP 5.5 6.2 5.8 5.8   P3 4.5 5.6 5.2 5.2      Hand AROM. Measured in degrees.    10/9/2024 10/9/2024 10/23/24 10/31/24     Left Right Right Right             Long: MP  0/86 0/78 0/80 0/80              PIP     0/106 0/77 0/80 0/80              DIP 0/75 Fused Fused Fused                Sensation:  Intact      Strength (Dyanmometer) and Pinch Strength (Pinch Gauge)  Measured in pounds and psi. Average of three trials.    11/7/2024  11/7/2024     Left Right   Rung II 37 30   Key Pinch 8 12   3pt Pinch 7 8              Treatment       Keena received the following direct contact modalities after being cleared for contraindications for 10 minutes:   -paraffin wax and moist hot pack to increase extensibility and decrease stiffness    Keena received the following manual therapy techniques for 5 minutes:   -retrograde massage to digits and palm to decrease edema  -scar mob     Keena received therapeutic exercises for 10 minutes including:  -joint blocking to PIP of right long finger, intrinsic plus position, flat fist and composite fist (no composite flexion for right long finger), finger abd/add and finger extension x 10 reps     Keena participated in dynamic functional therapeutic activities to improve functional performance for 20  minutes, including:  - nesting marbles picking up with just long finger and thumb x 2 trials  -red band with hand gripper x2'  -yellow putty: 2' molding, 2' grasping, 3 logs each 3 pt and key  -red cp with pink sponges x 3 trials  -yellow pw for grasping x 2'  -yellow df for grasping x 2'        Home Exercises and Education Provided     Education provided:   - continue to use coban/ digit sleeve for edema control  -recommended jar opener   - don't bend the DIP of long finger   - Progress towards goals: Excellent    Written Home Exercises Provided: Patient instructed to cont prior HEP.  Exercises were reviewed and Keena was able to demonstrate them prior to the end of the session.  Keena demonstrated good  understanding of the HEP provided.   .   See EMR under Patient Instructions for exercises provided prior visit.        Assessment   Pt states that she was compliant with using her Coban and digit sleeve for edema control for her right long finger and edema measurements have improved. Pt reports 0/10 pain in her finger today. Continued light strengthening activities with no c/o pain. Pt is progressing very well.    Pt would  continue to benefit from skilled OT.   Keena is progressing well towards her goals and there are no updates to goals at this time. Pt prognosis is Excellent.     Pt will continue to benefit from skilled outpatient occupational therapy to address the deficits listed in the problem list on initial evaluation provide pt/family education and to maximize pt's level of independence in the home and community environment.     Anticipated barriers to occupational therapy: none at this time     Pt's spiritual, cultural and educational needs considered and pt agreeable to plan of care and goals.      Goals:   Short Term (2 weeks on 10/283796):  1)   Patient to be IND with HEP and modalities for pain management. Met 10/29/2024  2)   Increase AROM at least 10 degrees for right long finger MP and PIP  to increase functional hand use for grasping objects. Ongoing  3)   Decrease edema .2-.3 cm to increase joint mobility /flexibility for improved overall functional hand use. Met 10/29/2024  4)   Decrease complaints of pain to  2 out of 10 at worst to increase functional hand use for ADL/work/leisure activities. - Met 10/29/2024     Long Term (by discharge):  1)   Pt will report 0 out of 10 pain with right hand use. Ongoing  2)   Patient to score at 67% or more on FOTO to demonstrate improved perception of functional right hand use.- Met 10/29/2024  3)   Pt will return to prior level of function for ADLs and household management.Ongoing     Plan:       Plan of Care Certification: 11/7/2024 to 12/7/2024.      Outpatient Occupational Therapy 2 times weekly for 4 weeks to include the following interventions: Paraffin, Fluidotherapy, Manual therapy/joint mobilizations, Modalities for pain management, Therapeutic exercises/activities., Strengthening, Edema Control, Scar Management, and Joint Protection.     Updates/Grading for next session: Advance as tolerated.      JADA Souza, CHT

## 2024-11-14 ENCOUNTER — CLINICAL SUPPORT (OUTPATIENT)
Dept: REHABILITATION | Facility: HOSPITAL | Age: 69
End: 2024-11-14
Payer: MEDICARE

## 2024-11-14 DIAGNOSIS — M25.60 RANGE OF MOTION DEFICIT: Primary | ICD-10-CM

## 2024-11-14 PROCEDURE — 97530 THERAPEUTIC ACTIVITIES: CPT | Mod: PN

## 2024-11-14 PROCEDURE — 97110 THERAPEUTIC EXERCISES: CPT | Mod: PN

## 2024-11-14 NOTE — PROGRESS NOTES
Occupational Therapy Daily Treatment  Note     Name: Keena Banks  Clinic Number: [id number]    Therapy Diagnosis:   Encounter Diagnosis   Name Primary?    Range of motion deficit Yes     Physician: James Brown MD    Visit Date: 11/14/2024    Physician Orders: Cont OT. May begin strengthening  Medical Diagnosis:   M15.1 (ICD-10-CM) - Degenerative arthritis of distal interphalangeal joint of middle finger of right hand      Surgical Procedure and Date: 8/27/2024, Right middle finger distal interphalangeal joint arthrodesis     Evaluation Date: 10/9/2024  Insurance Authorization Period Expiration: 10/7/2025  Plan of Care Certification Period: 11/7/2024 to 12/7/2024  Onset Date: 2 years ago  Date of Return to MD: none scheduled  Visit # / Visits authorized: 9/ 13   FOTO: Intake Score/54 Score 2/67    Time In: 2:00 pm  Time Out: 2:45 pm  Total Billable Time: 45 minutes    Precautions:  Standard    S/p 12 weeks on 11/14/24    Subjective     Pt reports: no new complaints.   she was compliant with home exercise program given last session.   Response to previous treatment: decreased pain in   Functional change: using for all light ADLs  Pain: 0/10   Location: right hand    Objective       Observation/Appearance:  Skin intact and scars are flat. Edema noted in right middle finger.     Edema. Measured in centimeters.    10/9/2024 10/9/2024 10/29/2024 10/31/2024     Left Right Right Right   Long:           P1 5.7 7.2 6.9 6.7    PIP 5.8 7.8 7.6 7.0   P2 5.3 6.5 6.4 6.0    DIP 5.5 6.2 5.8 5.8   P3 4.5 5.6 5.2 5.2      Hand AROM. Measured in degrees.    10/9/2024 10/9/2024 10/23/24 10/31/24 11/14/2024     Left Right Right Right Right              Long: MP  0/86 0/78 0/80 0/80 0/90              PIP     0/106 0/77 0/80 0/80 0/100              DIP 0/75 Fused Fused Fused Fused                 Sensation:  Intact      Strength (Dyanmometer) and Pinch Strength (Pinch Gauge)  Measured in pounds and psi. Average of three  trials.    11/7/2024 11/7/2024     Left Right   Rung II 37 30   Key Pinch 8 12   3pt Pinch 7 8              Treatment       Keena received the following direct contact modalities after being cleared for contraindications for 10 minutes:   -paraffin wax and moist hot pack to increase extensibility and decrease stiffness    Keena received the following manual therapy techniques for 5 minutes:   -retrograde massage to digits and palm to decrease edema  -scar mob     Keena received therapeutic exercises for 10 minutes including:  -joint blocking to PIP of right long finger, intrinsic plus position, flat fist and composite fist (no composite flexion for right long finger), finger abd/add and finger extension x 10 reps     Keena participated in dynamic functional therapeutic activities to improve functional performance for 20  minutes, including:  - nesting marbles picking up with just long finger and thumb x 2 trials  -1 red band and 1 yellow band with hand gripper x2'  -yellow putty: 2' molding, 2' grasping, 3 logs each 3 pt and key  -red cp with pink sponges x 3 trials  -yellow pw for grasping x 2'  -red df for grasping x 2'  -green sponge for grasping x 2'        Home Exercises and Education Provided     Education provided:   - continue to use coban/ digit sleeve for edema control  -green sponge  -recommended jar opener   - don't bend the DIP of long finger   - Progress towards goals: Excellent    Written Home Exercises Provided: Patient instructed to cont prior HEP.  Exercises were reviewed and Keena was able to demonstrate them prior to the end of the session.  Keena demonstrated good  understanding of the HEP provided.   .   See EMR under Patient Instructions for exercises provided prior visit.        Assessment   Pt states that she was compliant with using her Coban and digit sleeve for edema control for her right long finger and edema measurements have improved. Pt reports 0/10 pain in her finger today. Advanced light  strengthening activities with no c/o pain. Pt is progressing very well.    Pt would continue to benefit from skilled OT.   Keena is progressing well towards her goals and there are no updates to goals at this time. Pt prognosis is Excellent.     Pt will continue to benefit from skilled outpatient occupational therapy to address the deficits listed in the problem list on initial evaluation provide pt/family education and to maximize pt's level of independence in the home and community environment.     Anticipated barriers to occupational therapy: none at this time     Pt's spiritual, cultural and educational needs considered and pt agreeable to plan of care and goals.      Goals:   Short Term (2 weeks on 10/588962):  1)   Patient to be IND with HEP and modalities for pain management. Met 10/29/2024  2)   Increase AROM at least 10 degrees for right long finger MP and PIP  to increase functional hand use for grasping objects. Ongoing  3)   Decrease edema .2-.3 cm to increase joint mobility /flexibility for improved overall functional hand use. Met 10/29/2024  4)   Decrease complaints of pain to  2 out of 10 at worst to increase functional hand use for ADL/work/leisure activities. - Met 10/29/2024     Long Term (by discharge):  1)   Pt will report 0 out of 10 pain with right hand use. Ongoing  2)   Patient to score at 67% or more on FOTO to demonstrate improved perception of functional right hand use.- Met 10/29/2024  3)   Pt will return to prior level of function for ADLs and household management.Ongoing     Plan:       Plan of Care Certification: 11/7/2024 to 12/7/2024.      Outpatient Occupational Therapy 2 times weekly for 4 weeks to include the following interventions: Paraffin, Fluidotherapy, Manual therapy/joint mobilizations, Modalities for pain management, Therapeutic exercises/activities., Strengthening, Edema Control, Scar Management, and Joint Protection.     Updates/Grading for next session: Advance as  tolerated. Add red putty to yellow putty next session. Add 1 # weight next session.      JADA Souza, JUSTOT

## 2024-11-18 ENCOUNTER — CLINICAL SUPPORT (OUTPATIENT)
Dept: REHABILITATION | Facility: HOSPITAL | Age: 69
End: 2024-11-18
Payer: MEDICARE

## 2024-11-18 DIAGNOSIS — M25.60 RANGE OF MOTION DEFICIT: Primary | ICD-10-CM

## 2024-11-18 PROCEDURE — 97530 THERAPEUTIC ACTIVITIES: CPT | Mod: PN

## 2024-11-18 PROCEDURE — 97110 THERAPEUTIC EXERCISES: CPT | Mod: PN

## 2024-11-18 NOTE — PROGRESS NOTES
Occupational Therapy Daily Treatment  Note     Name: Keena Banks  Clinic Number: [id number]    Therapy Diagnosis:   Encounter Diagnosis   Name Primary?    Range of motion deficit Yes     Physician: James Brown MD    Visit Date: 11/18/2024    Physician Orders: Cont OT. May begin strengthening  Medical Diagnosis:   M15.1 (ICD-10-CM) - Degenerative arthritis of distal interphalangeal joint of middle finger of right hand      Surgical Procedure and Date: 8/27/2024, Right middle finger distal interphalangeal joint arthrodesis     Evaluation Date: 10/9/2024  Insurance Authorization Period Expiration: 10/7/2025  Plan of Care Certification Period: 11/7/2024 to 12/7/2024  Onset Date: 2 years ago  Date of Return to MD: none scheduled  Visit # / Visits authorized: 10/ 13   FOTO: Intake Score/54 Score 2/67, Score 3/71    Time In: 1:30 pm  Time Out: 2:15 pm  Total Billable Time: 45 minutes    Precautions:  Standard    S/p 12 weeks and 4 days on 11/14/24    Subjective     Pt reports: no new complaints.   she was compliant with home exercise program given last session.   Response to previous treatment: increased soreness right hand for 2 days  Functional change: using for all light ADLs  Pain: 0/10   Location: right hand    Objective       Observation/Appearance:  Skin intact and scars are flat. Edema noted in right middle finger.     Edema. Measured in centimeters.    10/9/2024 10/9/2024 10/29/2024 10/31/2024     Left Right Right Right   Long:           P1 5.7 7.2 6.9 6.7    PIP 5.8 7.8 7.6 7.0   P2 5.3 6.5 6.4 6.0    DIP 5.5 6.2 5.8 5.8   P3 4.5 5.6 5.2 5.2      Hand AROM. Measured in degrees.    10/9/2024 10/9/2024 10/23/24 10/31/24 11/14/2024     Left Right Right Right Right              Long: MP  0/86 0/78 0/80 0/80 0/90              PIP     0/106 0/77 0/80 0/80 0/100              DIP 0/75 Fused Fused Fused Fused                 Sensation:  Intact      Strength (Dyanmometer) and Pinch Strength (Pinch  Gauge)  Measured in pounds and psi. Average of three trials.    11/7/2024 11/7/2024     Left Right   Rung II 37 30   Key Pinch 8 12   3pt Pinch 7 8              Treatment       Keena received the following direct contact modalities after being cleared for contraindications for 10 minutes:   -paraffin wax and moist hot pack to increase extensibility and decrease stiffness    Keena received the following manual therapy techniques for 5 minutes:   -retrograde massage to digits and palm to decrease edema  -scar mob     Keena received therapeutic exercises for 10 minutes including:  -joint blocking to PIP of right long finger, intrinsic plus position, flat fist and composite fist (no composite flexion for right long finger), finger abd/add and finger extension x 10 reps     Keena participated in dynamic functional therapeutic activities to improve functional performance for 20  minutes, including:  - nesting marbles picking up with just long finger and thumb x 2 trials  -1 red band and 1 yellow band with hand gripper x2'  -yellow putty: 2' molding, 2' grasping, 3 logs each 3 pt and key (at home)  -red cp with pink sponges x 3 trials  -yellow pw for grasping x 2'  -red df for grasping x 2'  -green sponge for grasping x 2' (at home)        Home Exercises and Education Provided     Education provided:   - continue to use coban/ digit sleeve for edema control  -green sponge  -recommended jar opener   - don't bend the DIP of long finger   - Progress towards goals: Excellent    Written Home Exercises Provided: Patient instructed to cont prior HEP.  Exercises were reviewed and Keena was able to demonstrate them prior to the end of the session.  Keena demonstrated good  understanding of the HEP provided.   .   See EMR under Patient Instructions for exercises provided prior visit.        Assessment   Pt states that she was compliant with using her Coban and digit sleeve for edema control for her right long finger and edema measurements have  improved. Pt reports 0/10 pain in her finger today. Continued light strengthening activities with no c/o pain. Pt is progressing very well.    Pt would continue to benefit from skilled OT.   Keena is progressing well towards her goals and there are no updates to goals at this time. Pt prognosis is Excellent.     Pt will continue to benefit from skilled outpatient occupational therapy to address the deficits listed in the problem list on initial evaluation provide pt/family education and to maximize pt's level of independence in the home and community environment.     Anticipated barriers to occupational therapy: none at this time     Pt's spiritual, cultural and educational needs considered and pt agreeable to plan of care and goals.      Goals:   Short Term (2 weeks on 10/092415):  1)   Patient to be IND with HEP and modalities for pain management. Met 10/29/2024  2)   Increase AROM at least 10 degrees for right long finger MP and PIP  to increase functional hand use for grasping objects. Ongoing  3)   Decrease edema .2-.3 cm to increase joint mobility /flexibility for improved overall functional hand use. Met 10/29/2024  4)   Decrease complaints of pain to  2 out of 10 at worst to increase functional hand use for ADL/work/leisure activities. - Met 10/29/2024     Long Term (by discharge):  1)   Pt will report 0 out of 10 pain with right hand use. Ongoing  2)   Patient to score at 67% or more on FOTO to demonstrate improved perception of functional right hand use.- Met 10/29/2024  3)   Pt will return to prior level of function for ADLs and household management.Ongoing     Plan:       Plan of Care Certification: 11/7/2024 to 12/7/2024.      Outpatient Occupational Therapy 2 times weekly for 4 weeks to include the following interventions: Paraffin, Fluidotherapy, Manual therapy/joint mobilizations, Modalities for pain management, Therapeutic exercises/activities., Strengthening, Edema Control, Scar Management, and Joint  Protection.     Updates/Grading for next session: Advance as tolerated. Add red putty to yellow putty next session. Add 1 # weight next session.      JADA Souza, JUSTOT

## 2024-11-21 ENCOUNTER — CLINICAL SUPPORT (OUTPATIENT)
Dept: REHABILITATION | Facility: HOSPITAL | Age: 69
End: 2024-11-21
Payer: MEDICARE

## 2024-11-21 DIAGNOSIS — M25.60 RANGE OF MOTION DEFICIT: Primary | ICD-10-CM

## 2024-11-21 PROCEDURE — 97530 THERAPEUTIC ACTIVITIES: CPT | Mod: PN

## 2024-11-21 PROCEDURE — 97110 THERAPEUTIC EXERCISES: CPT | Mod: PN

## 2024-11-21 NOTE — PROGRESS NOTES
Occupational Therapy Daily Treatment  Note     Name: Keena Banks  Clinic Number: [id number]    Therapy Diagnosis:   Encounter Diagnosis   Name Primary?    Range of motion deficit Yes     Physician: James Brown MD    Visit Date: 11/21/2024    Physician Orders: Cont OT. May begin strengthening  Medical Diagnosis:   M15.1 (ICD-10-CM) - Degenerative arthritis of distal interphalangeal joint of middle finger of right hand      Surgical Procedure and Date: 8/27/2024, Right middle finger distal interphalangeal joint arthrodesis     Evaluation Date: 10/9/2024  Insurance Authorization Period Expiration: 10/7/2025  Plan of Care Certification Period: 11/7/2024 to 12/7/2024  Onset Date: 2 years ago  Date of Return to MD: none scheduled  Visit # / Visits authorized: 11/ 13   FOTO: Intake Score/54 Score 2/67, Score 3/71    Time In: 1:00 pm  Time Out: 1:45 pm  Total Billable Time: 45 minutes    Precautions:  Standard    S/p 13 weeks and 4 days on 11/21/24    Subjective     Pt reports: no new complaints.   she was compliant with home exercise program given last session.   Response to previous treatment: increased soreness right hand for 2 days  Functional change: using for all light ADLs  Pain: 0/10   Location: right hand    Objective       Observation/Appearance:  Skin intact and scars are flat. Edema noted in right middle finger.     Edema. Measured in centimeters.    10/9/2024 10/9/2024 10/29/2024 10/31/2024     Left Right Right Right   Long:           P1 5.7 7.2 6.9 6.7    PIP 5.8 7.8 7.6 7.0   P2 5.3 6.5 6.4 6.0    DIP 5.5 6.2 5.8 5.8   P3 4.5 5.6 5.2 5.2      Hand AROM. Measured in degrees.    10/9/2024 10/9/2024 10/23/24 10/31/24 11/14/2024     Left Right Right Right Right              Long: MP  0/86 0/78 0/80 0/80 0/90              PIP     0/106 0/77 0/80 0/80 0/100              DIP 0/75 Fused Fused Fused Fused                 Sensation:  Intact      Strength (Dyanmometer) and Pinch Strength (Pinch  Gauge)  Measured in pounds and psi. Average of three trials.    11/7/2024 11/7/2024     Left Right   Rung II 37 30   Key Pinch 8 12   3pt Pinch 7 8              Treatment       Keena received the following direct contact modalities after being cleared for contraindications for 10 minutes:   -paraffin wax and moist hot pack to increase extensibility and decrease stiffness    Keena received the following manual therapy techniques for 5 minutes:   -retrograde massage to digits and palm to decrease edema  -scar mob     Keena received therapeutic exercises for 10 minutes including:  -joint blocking to PIP of right long finger, intrinsic plus position, flat fist and composite fist (no composite flexion for right long finger), finger abd/add and finger extension x 10 reps     Keena participated in dynamic functional therapeutic activities to improve functional performance for 20  minutes, including:  - nesting marbles picking up with just long finger and thumb x 2 trials  -1 red band and 1 yellow band with hand gripper x2'  -yellow/red putty: 2' molding, 2' grasping, 3 logs each 3 pt and key   -red cp with pink sponges x 3 trials  -red pw for grasping x 2'  -1 lb wrist 3 ways 2 x 15 reps  -red df for grasping x 2'  -green sponge for grasping x 2'         Home Exercises and Education Provided     Education provided:   - continue to use coban/ digit sleeve for edema control  -red/yellow putty  -recommended jar opener   - don't bend the DIP of long finger   - Progress towards goals: Excellent    Written Home Exercises Provided: Patient instructed to cont prior HEP.  Exercises were reviewed and Keena was able to demonstrate them prior to the end of the session.  Keena demonstrated good  understanding of the HEP provided.   .   See EMR under Patient Instructions for exercises provided prior visit.        Assessment   Pt states that she was compliant with using her Coban and digit sleeve for edema control for her right long finger and edema  measurements have improved. Pt reports 0/10 pain in her finger today. Advanced light strengthening activities with no c/o pain. Pt is progressing very well.    Pt would continue to benefit from skilled OT.   Keena is progressing well towards her goals and there are no updates to goals at this time. Pt prognosis is Excellent.     Pt will continue to benefit from skilled outpatient occupational therapy to address the deficits listed in the problem list on initial evaluation provide pt/family education and to maximize pt's level of independence in the home and community environment.     Anticipated barriers to occupational therapy: none at this time     Pt's spiritual, cultural and educational needs considered and pt agreeable to plan of care and goals.      Goals:   Short Term (2 weeks on 10/158299):  1)   Patient to be IND with HEP and modalities for pain management. Met 10/29/2024  2)   Increase AROM at least 10 degrees for right long finger MP and PIP  to increase functional hand use for grasping objects. Ongoing  3)   Decrease edema .2-.3 cm to increase joint mobility /flexibility for improved overall functional hand use. Met 10/29/2024  4)   Decrease complaints of pain to  2 out of 10 at worst to increase functional hand use for ADL/work/leisure activities. - Met 10/29/2024     Long Term (by discharge):  1)   Pt will report 0 out of 10 pain with right hand use. Ongoing  2)   Patient to score at 67% or more on FOTO to demonstrate improved perception of functional right hand use.- Met 10/29/2024  3)   Pt will return to prior level of function for ADLs and household management.Ongoing     Plan:       Plan of Care Certification: 11/7/2024 to 12/7/2024.      Outpatient Occupational Therapy 2 times weekly for 4 weeks to include the following interventions: Paraffin, Fluidotherapy, Manual therapy/joint mobilizations, Modalities for pain management, Therapeutic exercises/activities., Strengthening, Edema Control, Scar  Management, and Joint Protection.     Updates/Grading for next session: Advance as tolerated.       JADA Souza, CHT

## 2024-12-02 ENCOUNTER — CLINICAL SUPPORT (OUTPATIENT)
Dept: REHABILITATION | Facility: HOSPITAL | Age: 69
End: 2024-12-02
Payer: MEDICARE

## 2024-12-02 DIAGNOSIS — M25.60 RANGE OF MOTION DEFICIT: Primary | ICD-10-CM

## 2024-12-02 PROCEDURE — 97530 THERAPEUTIC ACTIVITIES: CPT | Mod: PN

## 2024-12-02 PROCEDURE — 97110 THERAPEUTIC EXERCISES: CPT | Mod: PN

## 2024-12-02 PROCEDURE — 97022 WHIRLPOOL THERAPY: CPT | Mod: PN

## 2024-12-02 NOTE — PROGRESS NOTES
Occupational Therapy Daily Treatment  Note     Name: Keena Banks  Clinic Number: [id number]    Therapy Diagnosis:   Encounter Diagnosis   Name Primary?    Range of motion deficit Yes     Physician: James Brown MD    Visit Date: 12/2/2024    Physician Orders: Cont OT. 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   she was compliant with home exercise program given last session.   Response to previous treatment: increased soreness right hand for 2 days  Functional change: using for all light ADLs  Pain: 0/10   Location: right hand    Objective       Observation/Appearance:  Skin intact and scars are flat. Edema noted in right middle finger.     Edema. Measured in centimeters.    10/9/2024 10/9/2024 10/29/2024 10/31/2024     Left Right Right Right   Long:           P1 5.7 7.2 6.9 6.7    PIP 5.8 7.8 7.6 7.0   P2 5.3 6.5 6.4 6.0    DIP 5.5 6.2 5.8 5.8   P3 4.5 5.6 5.2 5.2      Hand AROM. Measured in degrees.    10/9/2024 10/9/2024 10/23/24 10/31/24 11/14/2024     Left Right Right Right Right              Long: MP  0/86 0/78 0/80 0/80 0/90              PIP     0/106 0/77 0/80 0/80 0/100              DIP 0/75 Fused Fused Fused Fused                 Sensation:  Intact      Strength (Dyanmometer) and Pinch Strength (Pinch  Gauge)  Measured in pounds and psi. Average of three trials.    11/7/2024 11/7/2024     Left Right   Rung II 37 30   Key Pinch 8 12   3pt Pinch 7 8              Treatment       Keena received the following supervised modality after being cleared for contraindications for 10 minutes:   -fluidotherapy to increase extensibility and decrease stiffness    Keena received the following manual therapy techniques for 5 minutes:   -retrograde massage to digits and palm to decrease edema  -scar mob     Keena received therapeutic exercises for 10 minutes including:  -joint blocking to PIP of right long finger, intrinsic plus position, flat fist and composite fist (no composite flexion for right long finger), finger abd/add and finger extension x 10 reps     Keena participated in dynamic functional therapeutic activities to improve functional performance for 20  minutes, including:  - nesting marbles picking up with just long finger and thumb x 2 trials  -1 red band and 1 yellow band with hand gripper x2'  -yellow/red putty: 2' molding, 2' grasping, 3 logs each 3 pt and key   -red cp with pink sponges x 3 trials  -red pw for grasping x 2'  -2 lb wrist 3 ways 2 x 15 reps  -red df for grasping x 2'  -green sponge for grasping x 2'         Home Exercises and Education Provided     Education provided:   - continue to use coban/ digit sleeve for edema control  -red/yellow putty  -recommended jar opener   - don't bend the DIP of long finger   - Progress towards goals: Excellent    Written Home Exercises Provided: Patient instructed to cont prior HEP.  Exercises were reviewed and Keena was able to demonstrate them prior to the end of the session.  Keena demonstrated good  understanding of the HEP provided.   .   See EMR under Patient Instructions for exercises provided prior visit.        Assessment   Pt states that she was compliant with using her Coban and digit sleeve for edema control for her right long finger and edema measurements have  improved. Pt reports 0/10 pain in her finger today. Advanced light strengthening activities with no c/o pain. Pt is progressing very well.    Pt would continue to benefit from skilled OT.   Keena is progressing well towards her goals and there are no updates to goals at this time. Pt prognosis is Excellent.     Pt will continue to benefit from skilled outpatient occupational therapy to address the deficits listed in the problem list on initial evaluation provide pt/family education and to maximize pt's level of independence in the home and community environment.     Anticipated barriers to occupational therapy: none at this time     Pt's spiritual, cultural and educational needs considered and pt agreeable to plan of care and goals.      Goals:   Short Term (2 weeks on [id number]):  1)   Patient to be IND with HEP and modalities for pain management. Met 10/29/2024  2)   Increase AROM at least 10 degrees for right long finger MP and PIP  to increase functional hand use for grasping objects. Ongoing  3)   Decrease edema .2-.3 cm to increase joint mobility /flexibility for improved overall functional hand use. Met 10/29/2024  4)   Decrease complaints of pain to  2 out of 10 at worst to increase functional hand use for ADL/work/leisure activities. - Met 10/29/2024     Long Term (by discharge):  1)   Pt will report 0 out of 10 pain with right hand use. Ongoing  2)   Patient to score at 67% or more on FOTO to demonstrate improved perception of functional right hand use.- Met 10/29/2024  3)   Pt will return to prior level of function for ADLs and household management.Ongoing     Plan: Measure next session and prepare for d/c.      Plan of Care Certification: 11/7/2024 to 12/7/2024.      Outpatient Occupational Therapy 2 times weekly for 4 weeks to include the following interventions: Paraffin, Fluidotherapy, Manual therapy/joint mobilizations, Modalities for pain management, Therapeutic exercises/activities., Strengthening,  Edema Control, Scar Management, and Joint Protection.     Updates/Grading for next session: Advance as tolerated.       JADA Souza, CHT

## 2024-12-05 ENCOUNTER — CLINICAL SUPPORT (OUTPATIENT)
Dept: REHABILITATION | Facility: HOSPITAL | Age: 69
End: 2024-12-05
Payer: MEDICARE

## 2024-12-05 DIAGNOSIS — M25.60 RANGE OF MOTION DEFICIT: Primary | ICD-10-CM

## 2024-12-05 PROCEDURE — 97110 THERAPEUTIC EXERCISES: CPT | Mod: PN

## 2024-12-05 PROCEDURE — 97530 THERAPEUTIC ACTIVITIES: CPT | Mod: PN

## 2024-12-05 PROCEDURE — 97022 WHIRLPOOL THERAPY: CPT | Mod: PN

## 2024-12-05 NOTE — PROGRESS NOTES
Occupational Therapy Discharge Note     Name: Keena Banks  Clinic Number: [id number]    Therapy Diagnosis:   Encounter Diagnosis   Name Primary?    Range of motion deficit Yes     Physician: James Brown MD    Visit Date: 12/5/2024    Physician Orders: Cont OT. 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   she was compliant with home exercise program given last session.   Response to previous treatment: getting stronger  Functional change: using for all light ADLs  Pain: 0/10   Location: right hand    Objective       Observation/Appearance:  Skin intact and scars are flat. Edema noted in right middle finger.     Edema. Measured in centimeters.    10/9/2024 10/9/2024 10/29/2024 10/31/2024 12/5/2024     Left Right Right Right Right   Long:            P1 5.7 7.2 6.9 6.7 6.5    PIP 5.8 7.8 7.6 7.0 7.0   P2 5.3 6.5 6.4 6.0 6.0    DIP 5.5 6.2 5.8 5.8 5.5   P3 4.5 5.6 5.2 5.2 5.2      Hand AROM. Measured in degrees.    10/9/2024 10/9/2024 10/23/24 10/31/24 11/14/2024 12/5/2024     Left Right Right Right Right Right               Long: MP  0/86 0/78 0/80 0/80 0/90 0/90              PIP     0/106 0/77 0/80 0/80 0/100 0/100              DIP 0/75 Fused Fused Fused Fused Fused                  Sensation:  Intact      Strength (Dyanmometer) and Pinch Strength (Pinch  Gauge)  Measured in pounds and psi. Average of three trials.    11/7/2024 11/7/2024 12/5/2024     Left Right Right   Rung II 37 30 48   Saxena Pinch 8 12 12   3pt Pinch 7 8 9              Treatment       Keena received the following supervised modality after being cleared for contraindications for 10 minutes:   -fluidotherapy to increase extensibility and decrease stiffness    Keena received the following manual therapy techniques for 5 minutes:   -retrograde massage to digits and palm to decrease edema  -scar mob     Keena received therapeutic exercises for 10 minutes including:  -joint blocking to PIP of right long finger, intrinsic plus position, flat fist and composite fist (no composite flexion for right long finger), finger abd/add and finger extension x 10 reps     Keena participated in dynamic functional therapeutic activities to improve functional performance for 20  minutes, including:  - nesting marbles picking up with just long finger and thumb x 2 trials  -Rung 2 with hand gripper x2'  -yellow/red putty: 2' molding, 2' grasping, 3 logs each 3 pt and key (at home)  -green cp with pink sponges x 3 trials  -green pw for grasping x 2'  -2 lb wrist 3 ways 2 x 15 reps  -red df for grasping x 2'  -green sponge for grasping x 2' (at home)        Home Exercises and Education Provided     Education provided:   - continue to use coban/ digit sleeve for edema control  -red/yellow putty  -recommended jar opener   -recommend heated mittens with paraffin at home  - don't bend the DIP of long finger     - Progress towards goals: Excellent    Written Home Exercises Provided: Patient instructed to cont prior HEP.  Exercises were reviewed and Keena was able to demonstrate them prior to the end of the session.  Keena demonstrated good  understanding of the HEP provided.   .   See EMR under Patient Instructions for exercises provided prior visit.        Assessment   Pt states that she was compliant with using her Coban and digit sleeve  for edema control for her right long finger and edema measurements have improved. Pt reports 0/10 pain in her finger today. Continued light strengthening activities with no c/o pain. Pt's edema has decreased in her right long finger and AROM of her long MP and PIP are WNL. Moderate improvements were noted with her right  strength.     Keena is progressing well towards her goals and there are no updates to goals at this time. Pt prognosis is Excellent.     Anticipated barriers to occupational therapy: none at this time     Pt's spiritual, cultural and educational needs considered and pt agreeable to plan of care and goals.      Goals:   Short Term (2 weeks on [id number]):  1)   Patient to be IND with HEP and modalities for pain management. Met 10/29/2024  2)   Increase AROM at least 10 degrees for right long finger MP and PIP  to increase functional hand use for grasping objects. Met 11/14/2024  3)   Decrease edema .2-.3 cm to increase joint mobility /flexibility for improved overall functional hand use. Met 10/29/2024  4)   Decrease complaints of pain to  2 out of 10 at worst to increase functional hand use for ADL/work/leisure activities. - Met 10/29/2024     Long Term (by discharge):  1)   Pt will report 0 out of 10 pain with right hand use. Met 12/5/2024  2)   Patient to score at 67% or more on FOTO to demonstrate improved perception of functional right hand use.- Met 10/29/2024  3)   Pt will return to prior level of function for ADLs and household management.Met 12/5/2024     Plan: Discharge from OT. All goals have been met.      Plan of Care Certification: 11/7/2024 to 12/7/2024.      Outpatient Occupational Therapy 2 times weekly for 4 weeks to include the following interventions: Paraffin, Fluidotherapy, Manual therapy/joint mobilizations, Modalities for pain management, Therapeutic exercises/activities., Strengthening, Edema Control, Scar Management, and Joint Protection.     Updates/Grading for next  session: Advance as tolerated.       JADA Souza, JUSTOT

## 2024-12-16 ENCOUNTER — OFFICE VISIT (OUTPATIENT)
Dept: FAMILY MEDICINE | Facility: CLINIC | Age: 69
End: 2024-12-16
Payer: MEDICARE

## 2024-12-16 ENCOUNTER — TELEPHONE (OUTPATIENT)
Dept: FAMILY MEDICINE | Facility: CLINIC | Age: 69
End: 2024-12-16

## 2024-12-16 VITALS
RESPIRATION RATE: 18 BRPM | BODY MASS INDEX: 25.65 KG/M2 | HEART RATE: 74 BPM | OXYGEN SATURATION: 98 % | SYSTOLIC BLOOD PRESSURE: 144 MMHG | TEMPERATURE: 98 F | DIASTOLIC BLOOD PRESSURE: 78 MMHG | WEIGHT: 144.75 LBS | HEIGHT: 63 IN

## 2024-12-16 DIAGNOSIS — Z12.11 SCREEN FOR COLON CANCER: ICD-10-CM

## 2024-12-16 DIAGNOSIS — G40.909 SEIZURE DISORDER: ICD-10-CM

## 2024-12-16 DIAGNOSIS — J30.9 CHRONIC ALLERGIC RHINITIS: ICD-10-CM

## 2024-12-16 DIAGNOSIS — Z86.73 HISTORY OF TIA (TRANSIENT ISCHEMIC ATTACK): ICD-10-CM

## 2024-12-16 DIAGNOSIS — D64.9 ANEMIA, UNSPECIFIED TYPE: ICD-10-CM

## 2024-12-16 DIAGNOSIS — F33.0 MAJOR DEPRESSIVE DISORDER, RECURRENT, MILD: ICD-10-CM

## 2024-12-16 DIAGNOSIS — D32.9 MENINGIOMA: ICD-10-CM

## 2024-12-16 DIAGNOSIS — I25.10 CORONARY ARTERY DISEASE INVOLVING NATIVE CORONARY ARTERY OF NATIVE HEART WITHOUT ANGINA PECTORIS: Primary | ICD-10-CM

## 2024-12-16 DIAGNOSIS — Z85.72 HISTORY OF LYMPHOMA: ICD-10-CM

## 2024-12-16 DIAGNOSIS — Z23 NEED FOR INFLUENZA VACCINATION: ICD-10-CM

## 2024-12-16 DIAGNOSIS — E78.5 HYPERLIPIDEMIA, UNSPECIFIED HYPERLIPIDEMIA TYPE: ICD-10-CM

## 2024-12-16 DIAGNOSIS — K76.0 NAFLD (NONALCOHOLIC FATTY LIVER DISEASE): ICD-10-CM

## 2024-12-16 DIAGNOSIS — I10 PRIMARY HYPERTENSION: ICD-10-CM

## 2024-12-16 DIAGNOSIS — M19.049 HAND ARTHRITIS: ICD-10-CM

## 2024-12-16 DIAGNOSIS — E55.9 VITAMIN D DEFICIENCY: ICD-10-CM

## 2024-12-16 DIAGNOSIS — M85.80 OSTEOPENIA, UNSPECIFIED LOCATION: ICD-10-CM

## 2024-12-16 DIAGNOSIS — F41.1 GAD (GENERALIZED ANXIETY DISORDER): ICD-10-CM

## 2024-12-16 DIAGNOSIS — Z23 NEED FOR COVID-19 VACCINE: ICD-10-CM

## 2024-12-16 PROCEDURE — 90653 IIV ADJUVANT VACCINE IM: CPT | Mod: S$GLB,,, | Performed by: INTERNAL MEDICINE

## 2024-12-16 PROCEDURE — 1126F AMNT PAIN NOTED NONE PRSNT: CPT | Mod: CPTII,S$GLB,, | Performed by: INTERNAL MEDICINE

## 2024-12-16 PROCEDURE — 1101F PT FALLS ASSESS-DOCD LE1/YR: CPT | Mod: CPTII,S$GLB,, | Performed by: INTERNAL MEDICINE

## 2024-12-16 PROCEDURE — 3008F BODY MASS INDEX DOCD: CPT | Mod: CPTII,S$GLB,, | Performed by: INTERNAL MEDICINE

## 2024-12-16 PROCEDURE — G0008 ADMIN INFLUENZA VIRUS VAC: HCPCS | Mod: S$GLB,,, | Performed by: INTERNAL MEDICINE

## 2024-12-16 PROCEDURE — 99214 OFFICE O/P EST MOD 30 MIN: CPT | Mod: S$GLB,,, | Performed by: INTERNAL MEDICINE

## 2024-12-16 PROCEDURE — 4010F ACE/ARB THERAPY RXD/TAKEN: CPT | Mod: CPTII,S$GLB,, | Performed by: INTERNAL MEDICINE

## 2024-12-16 PROCEDURE — 1160F RVW MEDS BY RX/DR IN RCRD: CPT | Mod: CPTII,S$GLB,, | Performed by: INTERNAL MEDICINE

## 2024-12-16 PROCEDURE — 90480 ADMN SARSCOV2 VAC 1/ONLY CMP: CPT | Mod: S$GLB,,, | Performed by: INTERNAL MEDICINE

## 2024-12-16 PROCEDURE — 3288F FALL RISK ASSESSMENT DOCD: CPT | Mod: CPTII,S$GLB,, | Performed by: INTERNAL MEDICINE

## 2024-12-16 PROCEDURE — 3078F DIAST BP <80 MM HG: CPT | Mod: CPTII,S$GLB,, | Performed by: INTERNAL MEDICINE

## 2024-12-16 PROCEDURE — G2211 COMPLEX E/M VISIT ADD ON: HCPCS | Mod: S$GLB,,, | Performed by: INTERNAL MEDICINE

## 2024-12-16 PROCEDURE — 91322 SARSCOV2 VAC 50 MCG/0.5ML IM: CPT | Mod: S$GLB,,, | Performed by: INTERNAL MEDICINE

## 2024-12-16 PROCEDURE — 3077F SYST BP >= 140 MM HG: CPT | Mod: CPTII,S$GLB,, | Performed by: INTERNAL MEDICINE

## 2024-12-16 PROCEDURE — 1159F MED LIST DOCD IN RCRD: CPT | Mod: CPTII,S$GLB,, | Performed by: INTERNAL MEDICINE

## 2024-12-16 PROCEDURE — 3044F HG A1C LEVEL LT 7.0%: CPT | Mod: CPTII,S$GLB,, | Performed by: INTERNAL MEDICINE

## 2024-12-16 NOTE — PROGRESS NOTES
Subjective:       Patient ID: Keena Banks is a 69 y.o. female.    Medication List with Changes/Refills   Current Medications    ASPIRIN (ECOTRIN) 81 MG EC TABLET    Take 81 mg by mouth once daily.    CALCIUM-VITAMIN D 250 MG-2.5 MCG (100 UNIT) PER TABLET    Take by mouth 2 (two) times daily.    FLUTICASONE PROPIONATE (FLONASE) 50 MCG/ACTUATION NASAL SPRAY    2 sprays (100 mcg total) by Each Nostril route once daily.    GUAIFENESIN (MUCINEX) 600 MG 12 HR TABLET    Take 2 tablets (1,200 mg total) by mouth 2 (two) times daily as needed for Congestion.    LAMOTRIGINE XR (LAMICTAL XR) 200 MG TR24 XR TABLET    Take 2 tablets (400 mg total) by mouth once daily.    LOSARTAN (COZAAR) 100 MG TABLET    Take 1 tablet (100 mg total) by mouth once daily.    MONTELUKAST (SINGULAIR) 10 MG TABLET    TAKE 1 TABLET BY MOUTH EVERY DAY IN THE EVENING    MULTIVITAMIN WITH MINERALS TABLET    Take 1 tablet by mouth once daily.    ROSUVASTATIN (CRESTOR) 20 MG TABLET    TAKE 1 TABLET BY MOUTH EVERY DAY    VENLAFAXINE (EFFEXOR-XR) 150 MG CP24    Take 1 capsule (150 mg total) by mouth once daily.    ZONISAMIDE (ZONEGRAN) 100 MG CAP    TAKE 3 CAPSULES BY MOUTH EVERY EVENING   Discontinued Medications    BENZONATATE (TESSALON) 200 MG CAPSULE    Take 1 capsule (200 mg total) by mouth 3 (three) times daily as needed for Cough.       Chief Complaint: No chief complaint on file.  She is here today to f/u on her chronic medical issues.        She has hypertension and is taking losartan 100 mg qday. She stopped HCTZ due to chronic hypokalemia. She denies chest pain or shortness of breath. She had a workup with cardiology with an echo (EF 45% per patient) and cardiac cath that per patient showed non obstructive disease with 30% blockage in one vessel.   She denies chest pain or shortness of breath. She has not been checking her BP at home.      She has hyperlipidemia controlled on crestor 20 mg qday. Her lipids on 6/2024 were 185/78/79/90. She is  taking aspirin daily.     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 She is taking lamictal  mg qday and zonegran 100 mg in am and 200 mg qhs.  Zonegran was added after a breakthrough seizure in 1/2023. EEG on 1/2023 was normal. MRI on 5/2023 showed a stable small left parietal meningioma unchanged and mildly expanded empty sella.  She was seen by neurology on 5/2024 and no changes were made. She is tolerating this regimen with only fatigue in the afternoon. She reports 2 weeks ago she woke up in the am having been incontinent of urine with headache and difficulty speaking. This lasted about a day. No obvious seizure activity.  She has not had a recurrence.     She has a meningioma seen on MRI on 5/2022. She denies any headaches, nausea or vision changes. She was seen by neurosurgery on 6/2022 and advised to repeat imaging in one year and then f/u. Repeat MRI on 5/2023 was unchanged.      She had an ultrasound showing fatty infiltration of her liver. She has normal LFTs on 9/2024.        She had depression with anxiety and is taking venlafaxine 150 mg daily. She feels her depression is stable. She denies any anxiety symptoms.No suicidal ideations. She is sleeping well at night.      She has a history of NHL(stage III A diffuse large B cell lymphoma) s/p 8 cycles of chemotherapy with CHOP/rituxan. She was  seen by oncology annual and her last appt was on 9/2024.  She continues to be in remission and will f/u in one year.     She has continues pain and arthritis in her hands. She was seen by rheumatology on 7/2019 and dx with erosive ostearthritis.   She reports increasing pain especially in the middle finger of her right hand. Her DIP joints remain swollen and painful. Xray of her hands on 11/2020 showed advanced degenerative change in the interphalangeal joints of both hands, mostly involving the DIP  joints but also the PIP joints at several sites.  This has progressed involving the left 2nd DIP joint and right 3rd DIP joint.  She underwent right third finger arthrodesis on 8/2024 and her pain has improved.        She lives with her  and granddaughter (graduating this year). She exercises regularly.  She tries to eat healthy. She denies any balance issues or falls.        Colonoscopy---8/2016 repeat in 8 years  Mammogram---7/2024 negative  DEXA-----11/2023 osteopenia with fracture risk  of 6.7%, hip fracture risk of 0.3% (Tv -1.3, Tf -0.2)  Pap-----once since ANDREW neg  Tdap---5/2019  Influenza vaccine----12/2023   Pneumovax 23----11/2015  Prevnar 13----12/2016  Shingrex--- 6/2018 , 9/2020   Covid vaccine---3 doses     RSV vaccine--6/2024    Review of Systems   Constitutional:  Negative for appetite change, fatigue, fever and unexpected weight change.   HENT:  Negative for congestion, ear pain, hearing loss, sore throat and trouble swallowing.    Eyes:  Negative for pain and visual disturbance.   Respiratory:  Negative for cough, chest tightness, shortness of breath and wheezing.    Cardiovascular:  Negative for chest pain, palpitations and leg swelling.   Gastrointestinal:  Negative for abdominal pain, blood in stool, constipation, diarrhea, nausea and vomiting.   Endocrine: Negative for polyuria.   Genitourinary:  Negative for dysuria and hematuria.   Musculoskeletal:  Negative for arthralgias, back pain and myalgias.   Skin:  Negative for rash.   Neurological:  Negative for dizziness, weakness, numbness and headaches.   Hematological:  Does not bruise/bleed easily.   Psychiatric/Behavioral:  Negative for dysphoric mood, sleep disturbance and suicidal ideas. The patient is not nervous/anxious.        Objective:      Vitals:    12/16/24 0914   BP: (!) 144/78   BP Location: Right arm   Patient Position: Sitting   Pulse: 74   Resp: 18   Temp: 98.1 °F (36.7 °C)   SpO2: 98%   Weight: 65.6 kg (144 lb 11.7 oz)  "  Height: 5' 3" (1.6 m)     Body mass index is 25.64 kg/m².  Physical Exam    General appearance: No acute distress, cooperative  Eyes: PERRL, EOMI, conjunctiva clear  Ears: normal external ear and pinna, tm clear without drainage, canals clear  Nose: Normal mucosa without drainage  Throat: no exudates or erythema, tonsils not enlarged  Mouth: no sores or lesions, moist mucous membranes  Neck: FROM, soft, supple, no thyromegaly, no bruits  Lymph: no anterior or posterior cervical adenopathy  Heart::  Regular rate and rhythm, no murmur  Lung: Clear to ascultation bilaterally, no wheezing, no rales, no rhonchi, no distress  Abdomen: Soft, nontender, no distention, no hepatosplenomegaly, bowel sounds normal, no guarding, no rebound, no peritoneal signs  Skin: no rashes, no lesions  Extremities: no edema, no cyanosis  Neuro: CN 2-12 intact, 5/5 muscle strength upper and lower extremity bilaterally, 2+ DTRs UE and LE bilaterally, normal gait  Peripheral pulses: 2+ pedal pulses bilaterally, good perfusion and color  Musculoskeletal: FROM, good strenth, no tenderness  Joint: normal appearance, no swelling, no warmth, bilateral hand deformity.     Assessment:       1. Coronary artery disease involving native coronary artery of native heart without angina pectoris    2. Primary hypertension    3. Hyperlipidemia, unspecified hyperlipidemia type    4. History of TIA (transient ischemic attack)    5. Chronic allergic rhinitis    6. NAFLD (nonalcoholic fatty liver disease)    7. Anemia, unspecified type    8. History of lymphoma    9. Meningioma    10. Seizure disorder    11. Major depressive disorder, recurrent, mild    12. SORAYA (generalized anxiety disorder)    13. Osteopenia, unspecified location    14. Vitamin D deficiency    15. Hand arthritis    16. Screen for colon cancer    17. Need for COVID-19 vaccine    18. Need for influenza vaccination        Plan:       Coronary artery disease involving native coronary artery of " native heart without angina pectoris  STable and no active symptoms    Primary hypertension  Elevated today and she will check BP daily and send readings in 2 weeks. If still elevated then consider adding amlodipine to her regimen.   -     CBC Auto Differential; Future; Expected date: 12/16/2024  -     Comprehensive Metabolic Panel; Future; Expected date: 12/16/2024  -     Lipid Panel; Future; Expected date: 12/16/2024  -     TSH; Future; Expected date: 12/16/2024    Hyperlipidemia, unspecified hyperlipidemia type  Continue crestor and aspirin    History of TIA (transient ischemic attack)  Question if she had a TIA vs seizure with her symptoms 2 weeks ago. Will get MRI of the brain and advised to f/u with neurology. Continue statin and aspirin.    Chronic allergic rhinitis  Well controlled and continue current regimen.     NAFLD (nonalcoholic fatty liver disease)  Normal LFTs and continue to monitor    Anemia, unspecified type  Improved    History of lymphoma  No recurrence    Meningioma  STable and will repeat MRI to evaluate TIA and check for stability.     Seizure disorder  Overall she is doing well but question if she had a break through seizure. Advised to f/u with neurology. Continue lamictal and zonegran at current doses.   -     MRI Brain W WO Contrast; Future; Expected date: 12/16/2024    Major depressive disorder, recurrent, mild  Good control on effexor    SORAYA (generalized anxiety disorder)  Well controlled and continue current regimen.     Osteopenia, unspecified location  Mild and continue to monitor. Recheck DEXA on 11/2025    Vitamin D deficiency  Continue supplementation.   -     Vitamin D; Future; Expected date: 12/16/2024    Hand arthritis  S/p surgical repair and she is doing well.     Screen for colon cancer  -     Case Request Endoscopy: COLONOSCOPY    Need for COVID-19 vaccine  -     COVID-19 (Moderna) 50 mcg/0.5 mL IM vaccine (>/= 13 yo) 0.5 mL    Need for influenza vaccination  -      influenza (adjuvanted) (Fluad) 45 mcg/0.5 mL IM vaccine (> or = 64 yo) 0.5 mL    Follow up in about 6 months (around 6/16/2025) for chronic medical issues.

## 2024-12-18 ENCOUNTER — PATIENT MESSAGE (OUTPATIENT)
Dept: RADIOLOGY | Facility: HOSPITAL | Age: 69
End: 2024-12-18
Payer: MEDICARE

## 2024-12-19 ENCOUNTER — HOSPITAL ENCOUNTER (OUTPATIENT)
Dept: RADIOLOGY | Facility: HOSPITAL | Age: 69
Discharge: HOME | End: 2024-12-19
Attending: INTERNAL MEDICINE
Payer: MEDICARE

## 2024-12-19 DIAGNOSIS — G40.909 SEIZURE DISORDER: ICD-10-CM

## 2024-12-19 PROCEDURE — A9585 GADOBUTROL INJECTION: HCPCS | Mod: PO | Performed by: INTERNAL MEDICINE

## 2024-12-19 PROCEDURE — 70553 MRI BRAIN STEM W/O & W/DYE: CPT | Mod: 26,,, | Performed by: RADIOLOGY

## 2024-12-19 PROCEDURE — 25500020 PHARM REV CODE 255: Mod: PO | Performed by: INTERNAL MEDICINE

## 2024-12-19 PROCEDURE — 70553 MRI BRAIN STEM W/O & W/DYE: CPT | Mod: TC,PO

## 2024-12-19 RX ORDER — GADOBUTROL 604.72 MG/ML
6 INJECTION INTRAVENOUS
Status: COMPLETED | OUTPATIENT
Start: 2024-12-19 | End: 2024-12-19

## 2024-12-19 RX ADMIN — GADOBUTROL 6 ML: 604.72 INJECTION INTRAVENOUS at 09:12

## 2024-12-20 ENCOUNTER — PATIENT MESSAGE (OUTPATIENT)
Dept: FAMILY MEDICINE | Facility: CLINIC | Age: 69
End: 2024-12-20
Payer: MEDICARE

## 2024-12-30 ENCOUNTER — TELEPHONE (OUTPATIENT)
Dept: GASTROENTEROLOGY | Facility: CLINIC | Age: 69
End: 2024-12-30
Payer: MEDICARE

## 2024-12-31 ENCOUNTER — TELEPHONE (OUTPATIENT)
Dept: FAMILY MEDICINE | Facility: CLINIC | Age: 69
End: 2024-12-31
Payer: MEDICARE

## 2024-12-31 NOTE — TELEPHONE ENCOUNTER
Spoke to pt, bp today is 150/81 p 69 - had bp med within an hour.  Pt states she was typing prior to this call and she hates to type so she advised that she will check it again later.  Advised her to send a message with updated reading.  Has not checked prior to this check for a few days.          KELLY - Pt was scheduled yesterday for a colonscopy in march

## 2025-01-27 ENCOUNTER — TELEPHONE (OUTPATIENT)
Dept: FAMILY MEDICINE | Facility: CLINIC | Age: 70
End: 2025-01-27

## 2025-01-27 DIAGNOSIS — R39.9 UTI SYMPTOMS: Primary | ICD-10-CM

## 2025-01-27 NOTE — TELEPHONE ENCOUNTER
----- Message from Wendy sent at 1/27/2025  9:49 AM CST -----  Contact: Patient  Type:  Same Day Appointment Request    Caller is requesting a same day appointment.  Caller declined first available appointment listed below.      Name of Caller:  Patient  When is the first available appointment?  N/A    Symptoms:  bladder infection / can't go longer than an hour urinating    Best Call Back Number:  [phone]    Additional Information:   States she would like to speak with someone about being seen today - states she has questions about also going to [facility] - please call - thank you

## 2025-01-27 NOTE — TELEPHONE ENCOUNTER
Spoke with pt - experiencing urinary urgency and frequency since this morning. Asking to have orders for urine testing. Order in system for pt to have UA and culture done at [facility] walk in lab. Would like any medication needed to called in to [facility] on [address]

## 2025-01-28 ENCOUNTER — PATIENT MESSAGE (OUTPATIENT)
Dept: FAMILY MEDICINE | Facility: CLINIC | Age: 70
End: 2025-01-28
Payer: MEDICARE

## 2025-02-05 ENCOUNTER — PATIENT OUTREACH (OUTPATIENT)
Dept: ADMINISTRATIVE | Facility: HOSPITAL | Age: 70
End: 2025-02-05
Payer: MEDICARE

## 2025-02-05 VITALS — SYSTOLIC BLOOD PRESSURE: 130 MMHG | DIASTOLIC BLOOD PRESSURE: 70 MMHG

## 2025-02-17 DIAGNOSIS — G40.909 SEIZURE DISORDER: ICD-10-CM

## 2025-02-17 RX ORDER — ZONISAMIDE 100 MG/1
CAPSULE ORAL
Qty: 270 CAPSULE | Refills: 2 | Status: CANCELLED | OUTPATIENT
Start: 2025-02-17

## 2025-02-17 RX ORDER — LOSARTAN POTASSIUM 50 MG/1
50 TABLET ORAL
Qty: 90 TABLET | Refills: 3 | OUTPATIENT
Start: 2025-02-17

## 2025-02-18 DIAGNOSIS — G40.909 SEIZURE DISORDER: ICD-10-CM

## 2025-02-18 RX ORDER — ZONISAMIDE 100 MG/1
CAPSULE ORAL
Qty: 270 CAPSULE | Refills: 3 | Status: SHIPPED | OUTPATIENT
Start: 2025-02-18 | End: 2026-02-13

## 2025-02-18 NOTE — TELEPHONE ENCOUNTER
----- Message from Tere sent at 2/17/2025 12:31 PM CST -----  Please refill the medication(s) listed below.Please call the patient when the prescription(s) is ready for  at this phone numberMedication #1 zonisamide (ZONEGRAN) 100 MG CapMedication #2Medication #3Preferred Pharmacy:[id number] [address] [address] [facility] [id number] [facility] [address] [place] [place] [address]Wxdzn: [phone] Fax: [phone] the patient rather a call back or a response via [facility]? CALLBest Call Back Number:Telephone Information:Mobile          [phone] Information: PT is needing med fill before Friday to prevent running out. Thank you

## 2025-02-18 NOTE — TELEPHONE ENCOUNTER
No care due was identified.  [facility] Embedded Care Due Messages. Reference number: [id number].   2/17/2025 7:43:08 PM CST

## 2025-02-18 NOTE — TELEPHONE ENCOUNTER
----- Message from Nakita sent at 2/18/2025  2:44 PM CST -----  Pt needing her refill for medication zonisamide (ZONEGRAN) 100 MG Cap Pt states she doesn't  have any medication for today , pt called on yesterday for refill request [facility]/pharmacy #[id number] - [place] - [address] [facility] [address] [id number] [facility] [address] [place] [address]Zdaol: [phone] Fax: [phone] patient's contact info below:Contact Name: Keena ShankPhone Number: [phone]

## 2025-02-18 NOTE — TELEPHONE ENCOUNTER
Refill Decision Note   Keena Jocelyn  is requesting a refill authorization.  Brief Assessment and Rationale for Refill:  Quick Discontinue     Medication Therapy Plan:  Patient currently on 100mg daily      Comments:     Note composed:9:44 PM 02/17/2025

## 2025-03-10 DIAGNOSIS — G40.909 SEIZURE DISORDER: ICD-10-CM

## 2025-03-12 RX ORDER — LAMOTRIGINE 200 MG/1
400 TABLET, EXTENDED RELEASE ORAL
Qty: 60 TABLET | Refills: 11 | Status: SHIPPED | OUTPATIENT
Start: 2025-03-12

## 2025-03-17 ENCOUNTER — OFFICE VISIT (OUTPATIENT)
Dept: NEUROLOGY | Facility: CLINIC | Age: 70
End: 2025-03-17
Payer: MEDICARE

## 2025-03-17 VITALS
HEART RATE: 74 BPM | SYSTOLIC BLOOD PRESSURE: 145 MMHG | BODY MASS INDEX: 24.58 KG/M2 | DIASTOLIC BLOOD PRESSURE: 85 MMHG | WEIGHT: 138.75 LBS

## 2025-03-17 DIAGNOSIS — G40.909 SEIZURE DISORDER: ICD-10-CM

## 2025-03-17 PROCEDURE — 1160F RVW MEDS BY RX/DR IN RCRD: CPT | Mod: CPTII,S$GLB,, | Performed by: NURSE PRACTITIONER

## 2025-03-17 PROCEDURE — 3288F FALL RISK ASSESSMENT DOCD: CPT | Mod: CPTII,S$GLB,, | Performed by: NURSE PRACTITIONER

## 2025-03-17 PROCEDURE — 1101F PT FALLS ASSESS-DOCD LE1/YR: CPT | Mod: CPTII,S$GLB,, | Performed by: NURSE PRACTITIONER

## 2025-03-17 PROCEDURE — 3079F DIAST BP 80-89 MM HG: CPT | Mod: CPTII,S$GLB,, | Performed by: NURSE PRACTITIONER

## 2025-03-17 PROCEDURE — 1159F MED LIST DOCD IN RCRD: CPT | Mod: CPTII,S$GLB,, | Performed by: NURSE PRACTITIONER

## 2025-03-17 PROCEDURE — 99999 PR PBB SHADOW E&M-EST. PATIENT-LVL IV: CPT | Mod: PBBFAC,,, | Performed by: NURSE PRACTITIONER

## 2025-03-17 PROCEDURE — 99214 OFFICE O/P EST MOD 30 MIN: CPT | Mod: S$GLB,,, | Performed by: NURSE PRACTITIONER

## 2025-03-17 PROCEDURE — 1126F AMNT PAIN NOTED NONE PRSNT: CPT | Mod: CPTII,S$GLB,, | Performed by: NURSE PRACTITIONER

## 2025-03-17 PROCEDURE — 3077F SYST BP >= 140 MM HG: CPT | Mod: CPTII,S$GLB,, | Performed by: NURSE PRACTITIONER

## 2025-03-17 PROCEDURE — 3008F BODY MASS INDEX DOCD: CPT | Mod: CPTII,S$GLB,, | Performed by: NURSE PRACTITIONER

## 2025-03-17 PROCEDURE — 4010F ACE/ARB THERAPY RXD/TAKEN: CPT | Mod: CPTII,S$GLB,, | Performed by: NURSE PRACTITIONER

## 2025-03-17 RX ORDER — ZONISAMIDE 100 MG/1
CAPSULE ORAL
Qty: 360 CAPSULE | Refills: 3 | Status: SHIPPED | OUTPATIENT
Start: 2025-03-17 | End: 2026-03-12

## 2025-03-17 NOTE — ASSESSMENT & PLAN NOTE
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    episodes of staring and unresponsiveness with associated rhythmic chewing motions of her mouth that are consistent with seizures. Last episode 3/12/2025     Pt reports compliance with AED   - cont Lamictal to 400 mg XR QD   - increase Zonegran to 400 mg (100 mg in AM and 300 mg in PM)   - Previously taking Keppra which gave her side effects. Briviact too costly.    - she is also on CBD oil with THC as per pain MD  Serologies noted    MRI brain scan neg acute   - stable meningioma  OK to take B12 supplement for energy support  No driving x 6 mos  Encouraged psychotherapy sessions for stress management
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
## 2025-03-17 NOTE — PATIENT INSTRUCTIONS
During a seizure:  - Ensure the person is in a safe place, remove hard or sharp objects from the area  - Turn the person on their side  - Never force anything into their mouth  - Keep on eye on the time, if the jerking/shaking/tensing of the muscles lasts > 5 minutes, call 911.    After a seizure:  - Allow them to lie quietly, gently call them to see if they wake up  - If there is injury or if they are not waking up within 5 minutes, call 911    Safety:  - Take showers, not baths  - Take care when around bodies of water (swimming pools, lakes, etc) and wear protective gear. Do not swim alone  - Use equipment with automatic shutoff safety features  - Do not climb ladders or use heavy machinery until seizure-free for 6 months  - Use the back burners when cooking  - If you have children, change diapers on the floor and avoid holding them while taking stairs  - Do not drive until seizure-free for 6 months    Triggers:  - Be sure to take you medication as scheduled without missed doses  - Be sure to get 8 hours of sleep each night  - Certain medications to avoid:   - Benadryl (diphenhydramine)   - Tramadol (Ultram)   - Certain antibiotics in the fluoroquinolone class (levaquin or cipro)   - Wellbutrin    - Chantix   - Alcohol   - Other illegal drugs or stimulant medications  - Herbal supplements to avoid that can lower the seizure threshold:   - Asafoetida, Borage, Ephedra, Ergot, Eucalyptus, Evening primrose, Ginkgo biloba, Ginseng, Pennyroyal, Leland, Shankpushpi, Star anise, Star fruit, Wormwood, and Yohimbine

## 2025-03-18 ENCOUNTER — TELEPHONE (OUTPATIENT)
Dept: SURGERY | Facility: HOSPITAL | Age: 70
End: 2025-03-18
Payer: MEDICARE

## 2025-03-18 NOTE — TELEPHONE ENCOUNTER
Spoke with pt. Pt states her dosing for her epilepsy medication is being increased and she would like to see how she does prior to rescheduling her c-scope. Pt will call back once she feels comfortable with how she is doing on her epilepsy medications to have procedure done.

## 2025-03-18 NOTE — TELEPHONE ENCOUNTER
Spoke with patient who states she is having trouble managing her epilepsy at this time and is seeing neurology. She would like to postpone procedure so as not to provoke any more seizures. Please contact patient to reschedule procedure. Thank you!

## 2025-04-16 ENCOUNTER — TELEPHONE (OUTPATIENT)
Dept: NEUROLOGY | Facility: CLINIC | Age: 70
End: 2025-04-16
Payer: MEDICARE

## 2025-04-16 NOTE — TELEPHONE ENCOUNTER
Spoke with pt about losing prescription drug coverage for Lamictal XR. She did not know of the change so I called her pharmacy to verify and they confirmed the drug is no longer covered by the pts insurance. Called the pt back to tell her the same, and she says she is okay with switching to generic Lamotrigine. Message fwd to AG

## 2025-04-21 ENCOUNTER — TELEPHONE (OUTPATIENT)
Dept: NEUROLOGY | Facility: CLINIC | Age: 70
End: 2025-04-21
Payer: MEDICARE

## 2025-04-21 DIAGNOSIS — G40.909 SEIZURE DISORDER: ICD-10-CM

## 2025-04-21 DIAGNOSIS — R05.1 ACUTE COUGH: ICD-10-CM

## 2025-04-21 DIAGNOSIS — J30.89 NON-SEASONAL ALLERGIC RHINITIS, UNSPECIFIED TRIGGER: ICD-10-CM

## 2025-04-21 RX ORDER — LAMOTRIGINE 200 MG/1
400 TABLET, EXTENDED RELEASE ORAL DAILY
Qty: 60 TABLET | Refills: 11 | Status: SHIPPED | OUTPATIENT
Start: 2025-04-21 | End: 2025-04-23

## 2025-04-21 NOTE — TELEPHONE ENCOUNTER
----- Message from Thuy sent at 4/21/2025  3:09 PM CDT -----  Contact: PT  Type:  RX Refill RequestWho Called: PT Refill or New Rx:REFILL RX Name and Strength:lamotrigine XR (LAMICTAL XR) 200 mg TR24 XR tabletHow is the patient currently taking it? (ex. 1XDay):TAKE 2 TABLETS BY MOUTH ONCE DAILY - OralIs this a 30 day or 90 day RX: 30Preferred Pharmacy with phone number: [facility]/pharmacy #[id number] - [place] - [id number] [address]Pcthv: [phone] Fax: [phone] or Mail Order:LOCALOrdering Provider:MILLAWould the patient rather a call back or a response via [facility]? CALL Best Call Back Number:[phone] Information: PLEASE CALL TO DISCUSS PA FOR EXTENDED RELEASE TABLETS PT HAVE QUESTION ABOUT TAKING REGULAR lamotrigineTHANK YOU

## 2025-04-21 NOTE — TELEPHONE ENCOUNTER
Care Due:                  Date            Visit Type   Department     Provider  --------------------------------------------------------------------------------                                EP -                              PRIMARY      [facility]  Last Visit: 12-      CARE ([facility])   MEDICINE       Marisela Lockwood                              EP -                              PRIMARY      [facility]  Next Visit: 06-      CARE ([facility])   [facility]       Marisela Lockwood                                                            Last  Test          Frequency    Reason                     Performed    Due Date  --------------------------------------------------------------------------------    Lipid Panel.  12 months..  rosuvastatin.............  06- 06-    [facility] Embedded Care Due Messages. Reference number: [id number].   4/21/2025 4:07:09 PM CDT

## 2025-04-21 NOTE — TELEPHONE ENCOUNTER
----- Message from Amie sent at 4/21/2025  8:31 AM CDT -----  Regarding: Needs Medical/RX Advice  Contact: patient at [phone]  Type: Needs Medical AdviceWho Called:  patient at [phone] Information: Patient is wanting to discuss the options for getting medication refilled. She last spoke with Laith. Please call and advise. Thank youlamotrigine XR (LAMICTAL XR) 200 mg TR24 XR tablet 60 tablet 11 3/12/2025 - Sig - Route: TAKE 2 TABLETS BY MOUTH ONCE DAILY - Oral Sent to pharmacy as: lamotrigine XR (LAMICTAL XR) 200 mg TR24 XR tablet E-Prescribing Status: Receipt confirmed by pharmacy (3/12/2025  1:40 PM CDT) No prior authorization was found for this prescription. Found prior authorization for another prescription for the same medication: Approved

## 2025-04-21 NOTE — TELEPHONE ENCOUNTER
Spoke with pt. She was following up on PA and medication that was supposed to been sent. Informed her I will complete PA and we have already routed message to Erin for lamictal tablets to be sent until XR is authorized by insurance. She verbalized understanding.

## 2025-04-21 NOTE — TELEPHONE ENCOUNTER
Refill Routing Note   Medication(s) are not appropriate for processing by [facility] for the following reason(s):        No active prescription written by provider    [facility] action(s):  Defer     Requires labs : Yes             Appointments  past 12m or future 3m with PCP    Date Provider   Last Visit   12/16/2024 Marisela Lockwood, DO   Next Visit   6/20/2025 Marisela Lockwood, DO   ED visits in past 90 days: 0        Note composed:4:07 PM 04/21/2025

## 2025-04-22 RX ORDER — MONTELUKAST SODIUM 10 MG/1
10 TABLET ORAL NIGHTLY
Qty: 90 TABLET | Refills: 2 | Status: SHIPPED | OUTPATIENT
Start: 2025-04-22

## 2025-04-23 ENCOUNTER — TELEPHONE (OUTPATIENT)
Dept: NEUROLOGY | Facility: CLINIC | Age: 70
End: 2025-04-23
Payer: MEDICARE

## 2025-04-26 ENCOUNTER — PATIENT MESSAGE (OUTPATIENT)
Dept: NEUROLOGY | Facility: CLINIC | Age: 70
End: 2025-04-26
Payer: MEDICARE

## 2025-05-02 ENCOUNTER — OFFICE VISIT (OUTPATIENT)
Dept: NEUROLOGY | Facility: CLINIC | Age: 70
End: 2025-05-02
Payer: MEDICARE

## 2025-05-02 DIAGNOSIS — G40.909 SEIZURE DISORDER: ICD-10-CM

## 2025-05-02 RX ORDER — LAMOTRIGINE 100 MG/1
200 TABLET ORAL 2 TIMES DAILY
Qty: 360 TABLET | Refills: 3 | Status: SHIPPED | OUTPATIENT
Start: 2025-05-02

## 2025-05-02 NOTE — ASSESSMENT & PLAN NOTE
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    episodes of staring and unresponsiveness with associated rhythmic chewing motions of her mouth that are consistent with seizures.   Recent seizure episode likely in the setting of forgetting to take her night AED meds.    Ins recently denied Lamictal XR  - XR changed to 200 mg IR BID  - Cont Zonegran to 400 mg (100 mg in AM and 300 mg in PM)   - Previously taking Keppra which gave her side effects. Briviact too costly.    - she is also on CBD oil with THC as per pain MD  Serologies noted     MRI brain scan neg acute   - stable meningioma  OK to take B12 supplement for energy support  Encouraged pt to start using alarms for AED reminders.   No driving x 6 mos  Encouraged psychotherapy sessions for stress management
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
## 2025-05-02 NOTE — Clinical Note
I want this pt to have a a Lamictal trough level drawn in 2 weeks please. Also I wanted her established with Dr. Paulson. Pt thought she was scheduled with Khurram in 2 weeks but I dont see this appt. Please inquire. If no appt made, no need for ASAP appt, next avail is OK

## 2025-05-21 ENCOUNTER — RESULTS FOLLOW-UP (OUTPATIENT)
Dept: NEUROLOGY | Facility: CLINIC | Age: 70
End: 2025-05-21

## 2025-05-29 DIAGNOSIS — E78.5 HYPERLIPIDEMIA, UNSPECIFIED HYPERLIPIDEMIA TYPE: ICD-10-CM

## 2025-05-29 RX ORDER — ROSUVASTATIN CALCIUM 20 MG/1
20 TABLET, COATED ORAL DAILY
Qty: 90 TABLET | Refills: 1 | Status: SHIPPED | OUTPATIENT
Start: 2025-05-29

## 2025-05-29 NOTE — TELEPHONE ENCOUNTER
No care due was identified.  [facility] Embedded Care Due Messages. Reference number: [id number].   5/29/2025 11:05:15 AM CDT

## 2025-05-29 NOTE — TELEPHONE ENCOUNTER
----- Message from Brannon sent at 5/29/2025 10:23 AM CDT -----  Type: RX Refill Request Who Called: Dunia you contacted your pharmacy: Refill RX Name and Strength: rosuvastatin (CRESTOR) 20 MG tabletPreferred Pharmacy with phone number: [facility]/pharmacy #[id number] [place] - [id number] [facility] [address] [address]: [phone] Fax: [phone] or Mail Order:local Would the patient rather a call back or a response via [facility]? Call backBest Call Back Number: Telephone Information:Mobile          [phone] Information: Rep would like a call back at [phone] is completely out of [place] you.  ----- Message -----  From: Brannon Dumont  Sent: 5/29/2025  10:26 AM CDT  To: Fabián AGOSTO Staff    Type: RX Refill Request Who Called: Dunia you contacted your pharmacy: Refill RX Name and Strength: rosuvastatin (CRESTOR) 20 MG tabletPreferred Pharmacy with phone number: [facility]/pharmacy #[id number] [place] - [id number] [facility] [address] [address]: [phone] Fax: [phone] or Mail Order:local Would the patient rather a call back or a response via [facility]? Call backBest Call Back Number: Telephone Information:Mobile          [phone] Information: Thank you.

## 2025-06-02 ENCOUNTER — TELEPHONE (OUTPATIENT)
Dept: FAMILY MEDICINE | Facility: CLINIC | Age: 70
End: 2025-06-02
Payer: MEDICARE

## 2025-06-02 ENCOUNTER — TELEPHONE (OUTPATIENT)
Dept: NEUROLOGY | Facility: CLINIC | Age: 70
End: 2025-06-02
Payer: MEDICARE

## 2025-06-02 DIAGNOSIS — I10 PRIMARY HYPERTENSION: ICD-10-CM

## 2025-06-02 DIAGNOSIS — F33.0 MAJOR DEPRESSIVE DISORDER, RECURRENT, MILD: ICD-10-CM

## 2025-06-02 DIAGNOSIS — R05.1 ACUTE COUGH: ICD-10-CM

## 2025-06-02 DIAGNOSIS — J30.89 NON-SEASONAL ALLERGIC RHINITIS, UNSPECIFIED TRIGGER: ICD-10-CM

## 2025-06-02 DIAGNOSIS — G40.909 SEIZURE DISORDER: ICD-10-CM

## 2025-06-02 DIAGNOSIS — E78.5 HYPERLIPIDEMIA, UNSPECIFIED HYPERLIPIDEMIA TYPE: ICD-10-CM

## 2025-06-02 RX ORDER — LOSARTAN POTASSIUM 100 MG/1
100 TABLET ORAL DAILY
Qty: 90 TABLET | Refills: 3 | Status: SHIPPED | OUTPATIENT
Start: 2025-06-02 | End: 2026-06-02

## 2025-06-02 RX ORDER — ROSUVASTATIN CALCIUM 20 MG/1
20 TABLET, COATED ORAL DAILY
Qty: 90 TABLET | Refills: 1 | Status: SHIPPED | OUTPATIENT
Start: 2025-06-02

## 2025-06-02 RX ORDER — MONTELUKAST SODIUM 10 MG/1
10 TABLET ORAL NIGHTLY
Qty: 90 TABLET | Refills: 2 | Status: SHIPPED | OUTPATIENT
Start: 2025-06-02

## 2025-06-02 RX ORDER — VENLAFAXINE HYDROCHLORIDE 150 MG/1
150 CAPSULE, EXTENDED RELEASE ORAL DAILY
Qty: 90 CAPSULE | Refills: 3 | Status: SHIPPED | OUTPATIENT
Start: 2025-06-02

## 2025-06-09 DIAGNOSIS — F33.0 MAJOR DEPRESSIVE DISORDER, RECURRENT, MILD: ICD-10-CM

## 2025-06-09 NOTE — TELEPHONE ENCOUNTER
Refill Routing Note   Medication(s) are not appropriate for processing by [facility] for the following reason(s):        Required vitals abnormal  Other    [facility] action(s):  Defer      Medication Therapy Plan: PT REQUESTING DIFFERENT PHARMACY      Appointments  past 12m or future 3m with PCP    Date Provider   Last Visit   12/16/2024 Marisela Lockwood, DO   Next Visit   6/20/2025 Marisela Lockwood,    ED visits in past 90 days: 0        Note composed:4:10 PM 06/09/2025

## 2025-06-09 NOTE — TELEPHONE ENCOUNTER
No care due was identified.  [facility] Embedded Care Due Messages. Reference number: [id number].   6/09/2025 10:14:01 AM CDT

## 2025-06-09 NOTE — TELEPHONE ENCOUNTER
Copied from CRM #[id number]. Topic: Medications - Medication Refill  >> Jun 9, 2025  9:38 AM Zachary Federico wrote:  Type:  RX Refill Request    Who Called: pt    Refill or New Rx:refill    RX Name and Strength:venlafaxine (EFFEXOR-XR) 150 MG Cp24    How is the patient currently taking it? (ex. 1XDay):as directed    Is this a 30 day or 90 day RX:    Preferred Pharmacy with phone number:    [facility]/pharmacy #[id number] - [place] - [address]  [place] [address]  Phone: [phone] Fax: [phone]      Local or Mail Order:LOCAL    Ordering Provider:CHRISS    Would the patient rather a call back or a response via [facility]? CALL BACK    Best Call Back Number:[phone]    Additional Information: Pt has 0 capsules left.

## 2025-06-10 RX ORDER — VENLAFAXINE HYDROCHLORIDE 150 MG/1
150 CAPSULE, EXTENDED RELEASE ORAL DAILY
Qty: 90 CAPSULE | Refills: 3 | Status: SHIPPED | OUTPATIENT
Start: 2025-06-10

## 2025-06-12 ENCOUNTER — TELEPHONE (OUTPATIENT)
Dept: FAMILY MEDICINE | Facility: CLINIC | Age: 70
End: 2025-06-12
Payer: MEDICARE

## 2025-06-12 NOTE — TELEPHONE ENCOUNTER
Copied from CRM #[id number]. Topic: Medications - Medication Refill  >> Jun 12, 2025  3:00 PM Nori wrote:  Type:  RX Refill Request    Who Called: Pt   Refill or New Rx:Refill   RX Name and Strength: Disp Refills Start End   venlafaxine (EFFEXOR-XR) 150 MG Cp24 90 capsule 3 6/10/2025 -   Sig - Route: Take 1 capsule (150 mg total) by mouth once daily. - Oral   Sent to pharmacy as: venlafaxine (EFFEXOR-XR) 150 MG Cp24   E-Prescribing Status: Receipt confirmed by pharmacy (6/10/2025  6:32 AM CDT)       How is the patient currently taking it? (ex. 1XDay):See Above  Is this a 30 day or 90 day RX:See Above  Preferred Pharmacy with phone number:    [facility]/pharmacy #[id number] - [place] - [address]  [address]  [place] [address]  Phone: [phone] Fax: [phone]      Local or Mail Order:Local   Ordering Provider:   Would the patient rather a call back or a response via [facility]? Call Please   Best Call Back Number:.[phone]      Additional Information: Pt states she been communicating to office that she is all out of rx and needs a refill today.      Pt states she only wants to use [facility]

## 2025-06-12 NOTE — TELEPHONE ENCOUNTER
Pt needing rx tomorrow. Asked that rx be changed to local [facility] instead of [facility]. [facility]. Called [facility] to notify them rx will be filled locally - has not been sent out and will hold rx - spoke with Kellie

## 2025-06-17 ENCOUNTER — PATIENT MESSAGE (OUTPATIENT)
Dept: FAMILY MEDICINE | Facility: CLINIC | Age: 70
End: 2025-06-17
Payer: MEDICARE

## 2025-06-20 ENCOUNTER — OFFICE VISIT (OUTPATIENT)
Dept: FAMILY MEDICINE | Facility: CLINIC | Age: 70
End: 2025-06-20
Payer: MEDICARE

## 2025-06-20 VITALS
BODY MASS INDEX: 23.14 KG/M2 | RESPIRATION RATE: 16 BRPM | HEIGHT: 63 IN | SYSTOLIC BLOOD PRESSURE: 138 MMHG | HEART RATE: 72 BPM | WEIGHT: 130.63 LBS | DIASTOLIC BLOOD PRESSURE: 72 MMHG | OXYGEN SATURATION: 99 % | TEMPERATURE: 98 F

## 2025-06-20 DIAGNOSIS — Z86.73 HISTORY OF TIA (TRANSIENT ISCHEMIC ATTACK): ICD-10-CM

## 2025-06-20 DIAGNOSIS — Z12.11 SCREEN FOR COLON CANCER: ICD-10-CM

## 2025-06-20 DIAGNOSIS — D32.9 MENINGIOMA: ICD-10-CM

## 2025-06-20 DIAGNOSIS — Z12.31 ENCOUNTER FOR SCREENING MAMMOGRAM FOR MALIGNANT NEOPLASM OF BREAST: ICD-10-CM

## 2025-06-20 DIAGNOSIS — M85.80 OSTEOPENIA, UNSPECIFIED LOCATION: ICD-10-CM

## 2025-06-20 DIAGNOSIS — G40.909 SEIZURE DISORDER: ICD-10-CM

## 2025-06-20 DIAGNOSIS — Z78.0 ASYMPTOMATIC MENOPAUSAL STATE: ICD-10-CM

## 2025-06-20 DIAGNOSIS — J30.9 CHRONIC ALLERGIC RHINITIS: ICD-10-CM

## 2025-06-20 DIAGNOSIS — I10 PRIMARY HYPERTENSION: ICD-10-CM

## 2025-06-20 DIAGNOSIS — Z85.72 HISTORY OF LYMPHOMA: ICD-10-CM

## 2025-06-20 DIAGNOSIS — E55.9 VITAMIN D DEFICIENCY: ICD-10-CM

## 2025-06-20 DIAGNOSIS — K76.0 NAFLD (NONALCOHOLIC FATTY LIVER DISEASE): ICD-10-CM

## 2025-06-20 DIAGNOSIS — E78.5 HYPERLIPIDEMIA, UNSPECIFIED HYPERLIPIDEMIA TYPE: ICD-10-CM

## 2025-06-20 DIAGNOSIS — I25.10 CORONARY ARTERY DISEASE INVOLVING NATIVE CORONARY ARTERY OF NATIVE HEART WITHOUT ANGINA PECTORIS: Primary | ICD-10-CM

## 2025-06-20 DIAGNOSIS — F41.1 GAD (GENERALIZED ANXIETY DISORDER): ICD-10-CM

## 2025-06-20 DIAGNOSIS — F33.0 MAJOR DEPRESSIVE DISORDER, RECURRENT, MILD: ICD-10-CM

## 2025-06-20 DIAGNOSIS — M19.042 ARTHRITIS OF BOTH HANDS: ICD-10-CM

## 2025-06-20 DIAGNOSIS — M19.041 ARTHRITIS OF BOTH HANDS: ICD-10-CM

## 2025-06-20 DIAGNOSIS — D64.9 ANEMIA, UNSPECIFIED TYPE: ICD-10-CM

## 2025-06-20 RX ORDER — TRIAMCINOLONE ACETONIDE 1 MG/G
CREAM TOPICAL 2 TIMES DAILY PRN
COMMUNITY
Start: 2025-05-15

## 2025-06-20 NOTE — PROGRESS NOTES
Subjective:       Patient ID: Keena Banks is a 70 y.o. female.    Medication List with Changes/Refills   Current Medications    ASPIRIN (ECOTRIN) 81 MG EC TABLET    Take 81 mg by mouth once daily.    CALCIUM-VITAMIN D 250 MG-2.5 MCG (100 UNIT) PER TABLET    Take by mouth 2 (two) times daily.    FLUTICASONE PROPIONATE (FLONASE) 50 MCG/ACTUATION NASAL SPRAY    2 sprays (100 mcg total) by Each Nostril route once daily.    LAMOTRIGINE (LAMICTAL) 100 MG TABLET    Take 2 tablets (200 mg total) by mouth 2 (two) times daily.    LOSARTAN (COZAAR) 100 MG TABLET    Take 1 tablet (100 mg total) by mouth once daily.    MONTELUKAST (SINGULAIR) 10 MG TABLET    Take 1 tablet (10 mg total) by mouth every evening.    MULTIVITAMIN WITH MINERALS TABLET    Take 1 tablet by mouth once daily.    ROSUVASTATIN (CRESTOR) 20 MG TABLET    Take 1 tablet (20 mg total) by mouth once daily.    TRIAMCINOLONE ACETONIDE 0.1% (KENALOG) 0.1 % CREAM    Apply topically 2 (two) times daily as needed.    VENLAFAXINE (EFFEXOR-XR) 150 MG CP24    Take 1 capsule (150 mg total) by mouth once daily.    ZONISAMIDE (ZONEGRAN) 100 MG CAP    Take 1 capsule (100 mg total) by mouth once daily AND 3 capsules (300 mg total) nightly.   Discontinued Medications    GUAIFENESIN (MUCINEX) 600 MG 12 HR TABLET    Take 2 tablets (1,200 mg total) by mouth 2 (two) times daily as needed for Congestion.       Chief Complaint: Follow-up (6 month follow up )  She is here today to f/u on her chronic medical issues.        She has hypertension and is taking losartan 100 mg qday. She stopped HCTZ due to chronic hypokalemia. She denies chest pain or shortness of breath. She had a workup with cardiology with an echo (EF 45% per patient) and cardiac cath that per patient showed non obstructive disease with 30% blockage in one vessel.   She denies chest pain or shortness of breath.     She has hyperlipidemia controlled on crestor 20 mg qday. Her lipids on 6/2025 were 159/43/75/75. She is  taking aspirin daily.     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 She is taking lamictal  mg bid  (changed from lamictal xr 400 mg daily due to insurance in 4/2025) and zonegran 100 mg in am and 300 mg qhs (started in 1/2023). .EEG on 1/2023 was normal. MRI on 5/2023 showed a stable small left parietal meningioma unchanged and mildly expanded empty sella.  She was seen by neurology on 5/2025. She reports multiple petite mal seizures over the last 3 months.  She is scheduled to see neurology in September.     She has a meningioma seen on MRI on 5/2022. She denies any headaches, nausea or vision changes. She was seen by neurosurgery on 6/2022 and advised to repeat imaging in one year and then f/u. Repeat MRI on 3/2025 was unchanged. CTA of the head and neck on 3/2025 was negative.      She had an ultrasound showing fatty infiltration of her liver. She has normal LFTs on 6/2025.         She had depression with anxiety and is taking venlafaxine 150 mg daily. She feels her depression is stable. She denies any anxiety symptoms.No suicidal ideations. She is sleeping well at night.      She has a history of NHL(stage III A diffuse large B cell lymphoma) s/p 8 cycles of chemotherapy with CHOP/rituxan. She was  seen by oncology annual and her last appt was on 9/2024.  She continues to be in remission and will f/u in one year.    She had a lesion removed on her anterior chest and is scheduled to f/u with dermatology for results next week.      She has continues pain and arthritis in her hands. She was seen by rheumatology on 7/2019 and dx with erosive ostearthritis.   She reports increasing pain especially in the middle finger of her right hand. Her DIP joints remain swollen and painful. Xray of her hands on 11/2020 showed advanced degenerative change in the interphalangeal joints of both hands, mostly involving  the DIP joints but also the PIP joints at several sites.  This has progressed involving the left 2nd DIP joint and right 3rd DIP joint.  She underwent right third finger arthrodesis on 8/2024 and her pain has improved.        She lives with her  and granddaughter (graduating this year). She exercises regularly.  She tries to eat healthy. She denies any balance issues or falls.        Colonoscopy---8/2016 repeat in 8 years  Mammogram---7/2024 negative  DEXA-----11/2023 osteopenia with fracture risk  of 6.7%, hip fracture risk of 0.3% (Tv -1.3, Tf -0.2)  Pap-----once since ANDREW neg  Tdap---5/2019  Influenza vaccine----12/2024  Pneumovax 23----11/2015  Prevnar 13----12/2016  Shingrex--- 6/2018 , 9/2020   Covid vaccine---4 doses     RSV vaccine--6/2024    Review of Systems   Constitutional:  Positive for fatigue. Negative for appetite change, fever and unexpected weight change.   HENT:  Negative for congestion, ear pain, hearing loss, sore throat and trouble swallowing.    Eyes:  Negative for pain and visual disturbance.   Respiratory:  Negative for cough, chest tightness, shortness of breath and wheezing.    Cardiovascular:  Negative for chest pain, palpitations and leg swelling.   Gastrointestinal:  Positive for diarrhea. Negative for abdominal pain, blood in stool, constipation, nausea and vomiting.   Endocrine: Negative for polyuria.   Genitourinary:  Negative for difficulty urinating, dysuria, frequency, hematuria and urgency.   Musculoskeletal:  Positive for arthralgias. Negative for back pain and myalgias.   Skin:  Negative for rash.   Neurological:  Positive for headaches. Negative for dizziness, weakness and numbness.   Hematological:  Does not bruise/bleed easily.   Psychiatric/Behavioral:  Negative for dysphoric mood, sleep disturbance and suicidal ideas. The patient is nervous/anxious.        Objective:      Vitals:    06/20/25 0853   BP: 138/72   BP Location: Left arm   Patient Position: Sitting  "  Pulse: 72   Resp: 16   Temp: 97.5 °F (36.4 °C)   TempSrc: Temporal   SpO2: 99%   Weight: 59.2 kg (130 lb 10 oz)   Height: 5' 3" (1.6 m)     Body mass index is 23.14 kg/m².  Physical Exam    General appearance: No acute distress, cooperative  Eyes: PERRL, EOMI, conjunctiva clear  Ears: normal external ear and pinna, tm clear without drainage, canals clear  Nose: Normal mucosa without drainage  Throat: no exudates or erythema, tonsils not enlarged  Mouth: no sores or lesions, moist mucous membranes  Neck: FROM, soft, supple, no thyromegaly, no bruits  Lymph: no anterior or posterior cervical adenopathy  Heart::  Regular rate and rhythm, no murmur  Lung: Clear to ascultation bilaterally, no wheezing, no rales, no rhonchi, no distress  Abdomen: Soft, nontender, no distention, no hepatosplenomegaly, bowel sounds normal, no guarding, no rebound, no peritoneal signs  Skin: no rashes, no lesions  Extremities: no edema, no cyanosis  Neuro: CN 2-12 intact, 5/5 muscle strength upper and lower extremity bilaterally, 2+ DTRs UE and LE bilaterally, normal gait  Peripheral pulses: 2+ pedal pulses bilaterally  Musculoskeletal: FROM, good strenth, no tenderness  Joint: normal appearance, no swelling, no warmth, bilateral hand deformity.    Assessment:       1. Coronary artery disease involving native coronary artery of native heart without angina pectoris    2. Primary hypertension    3. Hyperlipidemia, unspecified hyperlipidemia type    4. History of TIA (transient ischemic attack)    5. Chronic allergic rhinitis    6. NAFLD (nonalcoholic fatty liver disease)    7. Anemia, unspecified type    8. Seizure disorder    9. History of lymphoma    10. Meningioma    11. Major depressive disorder, recurrent, mild    12. SORAYA (generalized anxiety disorder)    13. Osteopenia, unspecified location    14. Vitamin D deficiency    15. Arthritis of both hands    16. Screen for colon cancer    17. Encounter for screening mammogram for malignant " neoplasm of breast    18. Asymptomatic menopausal state        Plan:       Coronary artery disease involving native coronary artery of native heart without angina pectoris  Stable and no active symptoms. Will repeat her echo.   -     Echo; Future    Primary hypertension  Well controlled and continue current regimen.   -     CBC Auto Differential; Future; Expected date: 06/20/2025  -     Comprehensive Metabolic Panel; Future; Expected date: 06/20/2025    Hyperlipidemia, unspecified hyperlipidemia type  Good control on crestor and aspirin    History of TIA (transient ischemic attack)  Continue statin and aspirin    Chronic allergic rhinitis  Well controlled and continue current regimen.     NAFLD (nonalcoholic fatty liver disease)  Normal LFTs and continue to monitor    Anemia, unspecified type  Mild and stable    Seizure disorder  Uncontrolled despite this regimen. Advised to reach out to neurology for adjustment of medications.     History of lymphoma  No recurrence    Meningioma  Stable on MRI    Major depressive disorder, recurrent, mild  Good control on effexor    SORAYA (generalized anxiety disorder)  Good control on effexor    Osteopenia, unspecified location  Mild and continue to monitor. Due to repeat DEXA on 11/2024    Vitamin D deficiency  Continue supplementation    Arthritis of both hands  Stable     Screen for colon cancer  -     Fecal Immunochemical Test (iFOBT); Future; Expected date: 06/20/2025    Encounter for screening mammogram for malignant neoplasm of breast  -     Mammo Digital Screening Bilat w/ Eugene (XPD); Future; Expected date: 07/30/2025    Asymptomatic menopausal state  -     DXA Bone Density Axial Skeleton 1 or more sites; Future; Expected date: 06/20/2025    Follow up in about 6 months (around 12/20/2025) for chronic medical issues.

## 2025-06-23 ENCOUNTER — HOSPITAL ENCOUNTER (OUTPATIENT)
Dept: CARDIOLOGY | Facility: HOSPITAL | Age: 70
Discharge: HOME | End: 2025-06-23
Attending: INTERNAL MEDICINE
Payer: MEDICARE

## 2025-06-23 VITALS — HEIGHT: 63 IN | WEIGHT: 130 LBS | BODY MASS INDEX: 23.04 KG/M2 | HEART RATE: 73 BPM

## 2025-06-23 DIAGNOSIS — I25.10 CORONARY ARTERY DISEASE INVOLVING NATIVE CORONARY ARTERY OF NATIVE HEART WITHOUT ANGINA PECTORIS: ICD-10-CM

## 2025-06-23 PROCEDURE — 93306 TTE W/DOPPLER COMPLETE: CPT | Mod: PO

## 2025-06-23 PROCEDURE — 93306 TTE W/DOPPLER COMPLETE: CPT | Mod: 26,,, | Performed by: INTERNAL MEDICINE

## 2025-06-24 LAB
AORTIC SIZE INDEX (SOV): 1.8 CM/M2
AORTIC SIZE INDEX: 2 CM/M2
ASCENDING AORTA: 3.2 CM
AV INDEX (PROSTH): 0.74
AV MEAN GRADIENT: 3 MMHG
AV PEAK GRADIENT: 6 MMHG
AV VALVE AREA BY VELOCITY RATIO: 2.9 CM²
AV VALVE AREA: 2.6 CM²
AV VELOCITY RATIO: 0.83
BSA FOR ECHO PROCEDURE: 1.62 M2
CV ECHO LV RWT: 0.39 CM
DOP CALC AO PEAK VEL: 1.2 M/S
DOP CALC AO VTI: 28.9 CM
DOP CALC LVOT AREA: 3.5 CM2
DOP CALC LVOT DIAMETER: 2.1 CM
DOP CALC LVOT PEAK VEL: 1 M/S
DOP CALC LVOT STROKE VOLUME: 74.4 CM3
DOP CALCLVOT PEAK VEL VTI: 21.5 CM
E WAVE DECELERATION TIME: 245 MSEC
E/A RATIO: 0.76
E/E' RATIO: 13 M/S
ECHO LV POSTERIOR WALL: 1 CM (ref 0.6–1.1)
FRACTIONAL SHORTENING: 35.3 % (ref 28–44)
INTERVENTRICULAR SEPTUM: 0.9 CM (ref 0.6–1.1)
IVRT: 134 MSEC
LEFT ATRIUM AREA SYSTOLIC (APICAL 2 CHAMBER): 11.74 CM2
LEFT ATRIUM AREA SYSTOLIC (APICAL 4 CHAMBER): 12.96 CM2
LEFT ATRIUM SIZE: 3.2 CM
LEFT ATRIUM VOLUME INDEX MOD: 18 ML/M2
LEFT ATRIUM VOLUME MOD: 29 ML
LEFT INTERNAL DIMENSION IN SYSTOLE: 3.3 CM (ref 2.1–4)
LEFT VENTRICLE DIASTOLIC VOLUME INDEX: 77.64 ML/M2
LEFT VENTRICLE DIASTOLIC VOLUME: 125 ML
LEFT VENTRICLE END SYSTOLIC VOLUME APICAL 2 CHAMBER: 25.96 ML
LEFT VENTRICLE END SYSTOLIC VOLUME APICAL 4 CHAMBER: 32.17 ML
LEFT VENTRICLE MASS INDEX: 109.1 G/M2
LEFT VENTRICLE SYSTOLIC VOLUME INDEX: 26.7 ML/M2
LEFT VENTRICLE SYSTOLIC VOLUME: 43 ML
LEFT VENTRICULAR INTERNAL DIMENSION IN DIASTOLE: 5.1 CM (ref 3.5–6)
LEFT VENTRICULAR MASS: 175.6 G
LV LATERAL E/E' RATIO: 13.2 M/S
LV SEPTAL E/E' RATIO: 13.2 M/S
LVED V (TEICH): 124.5 ML
LVES V (TEICH): 42.94 ML
LVOT MG: 2.21 MMHG
LVOT MV: 0.69 CM/S
MV PEAK A VEL: 0.87 M/S
MV PEAK E VEL: 0.66 M/S
MV STENOSIS PRESSURE HALF TIME: 71.14 MS
MV VALVE AREA P 1/2 METHOD: 3.09 CM2
OHS CV RV/LV RATIO: 0.69 CM
PISA MRMAX VEL: 5.96 M/S
PISA TR MAX VEL: 2.3 M/S
PULM VEIN S/D RATIO: 1.29
PV PEAK D VEL: 0.34 M/S
PV PEAK S VEL: 0.44 M/S
RA PRESSURE ESTIMATED: 8 MMHG
RA VOL SYS: 20.75 ML
RIGHT ATRIAL AREA: 9.8 CM2
RIGHT ATRIUM END SYSTOLIC VOLUME APICAL 4 CHAMBER INDEX BSA: 12.39 ML/M2
RIGHT ATRIUM VOLUME AREA LENGTH APICAL 4 CHAMBER: 19.95 ML
RIGHT VENTRICLE DIASTOLIC BASEL DIMENSION: 3.5 CM
RIGHT VENTRICLE DIASTOLIC LENGTH: 3.7 CM
RIGHT VENTRICLE DIASTOLIC MID DIMENSION: 2.6 CM
RIGHT VENTRICULAR END-DIASTOLIC DIMENSION: 3.47 CM
RIGHT VENTRICULAR LENGTH IN DIASTOLE (APICAL 4-CHAMBER VIEW): 3.66 CM
RV MID DIAMA: 2.57 CM
RV TB RVSP: 10 MMHG
RV TISSUE DOPPLER FREE WALL SYSTOLIC VELOCITY 1 (APICAL 4 CHAMBER VIEW): 11.49 CM/S
SINUS: 2.89 CM
STJ: 2.7 CM
TDI LATERAL: 0.05 M/S
TDI SEPTAL: 0.05 M/S
TDI: 0.05 M/S
TR MAX PG: 22 MMHG
TRICUSPID ANNULAR PLANE SYSTOLIC EXCURSION: 2.3 CM
TV REST PULMONARY ARTERY PRESSURE: 29 MMHG
Z-SCORE OF LEFT VENTRICULAR DIMENSION IN END DIASTOLE: 1.1
Z-SCORE OF LEFT VENTRICULAR DIMENSION IN END SYSTOLE: 1.21

## 2025-06-25 ENCOUNTER — PATIENT MESSAGE (OUTPATIENT)
Dept: FAMILY MEDICINE | Facility: CLINIC | Age: 70
End: 2025-06-25
Payer: MEDICARE

## 2025-06-26 ENCOUNTER — TELEPHONE (OUTPATIENT)
Dept: NEUROLOGY | Facility: CLINIC | Age: 70
End: 2025-06-26
Payer: MEDICARE

## 2025-06-30 ENCOUNTER — TELEPHONE (OUTPATIENT)
Dept: NEUROLOGY | Facility: CLINIC | Age: 70
End: 2025-06-30
Payer: MEDICARE

## 2025-06-30 NOTE — TELEPHONE ENCOUNTER
Pt called and said when she went to pickup a Rx and the pharmacy gave her the wrong quantity based on prev. Order. I told pt I will call pharmacy and correct order... Pharmacy contacted order corrected, prev order cancelled.

## 2025-07-01 DIAGNOSIS — G40.909 SEIZURE DISORDER: ICD-10-CM

## 2025-07-01 RX ORDER — LAMOTRIGINE 100 MG/1
200 TABLET ORAL 2 TIMES DAILY
Qty: 360 TABLET | Refills: 3 | Status: SHIPPED | OUTPATIENT
Start: 2025-07-01

## 2025-07-02 ENCOUNTER — PATIENT MESSAGE (OUTPATIENT)
Dept: FAMILY MEDICINE | Facility: CLINIC | Age: 70
End: 2025-07-02
Payer: MEDICARE

## 2025-07-31 DIAGNOSIS — R05.1 ACUTE COUGH: ICD-10-CM

## 2025-07-31 DIAGNOSIS — I10 PRIMARY HYPERTENSION: ICD-10-CM

## 2025-07-31 DIAGNOSIS — J30.89 NON-SEASONAL ALLERGIC RHINITIS, UNSPECIFIED TRIGGER: ICD-10-CM

## 2025-07-31 RX ORDER — MONTELUKAST SODIUM 10 MG/1
10 TABLET ORAL NIGHTLY
Qty: 90 TABLET | Refills: 3 | Status: SHIPPED | OUTPATIENT
Start: 2025-07-31

## 2025-07-31 RX ORDER — LOSARTAN POTASSIUM 100 MG/1
100 TABLET ORAL DAILY
Qty: 90 TABLET | Refills: 3 | Status: SHIPPED | OUTPATIENT
Start: 2025-07-31 | End: 2026-07-31

## 2025-07-31 NOTE — TELEPHONE ENCOUNTER
Refill Routing Note   Medication(s) are not appropriate for processing by [facility] for the following reason(s):        Drug-disease interaction    [facility] action(s):  Defer      Medication Therapy Plan: Drug-Disease: losartan and Fatty liver; NAFLD (nonalcoholic fatty liver disease)    Pharmacist review requested: Yes     Appointments  past 12m or future 3m with PCP    Date Provider   Last Visit   6/20/2025 Marisela Lockwood,    Next Visit   1/8/2026 Marisela Lockwood, DO   ED visits in past 90 days: 0        Note composed:3:05 PM 07/31/2025

## 2025-07-31 NOTE — TELEPHONE ENCOUNTER
Refill Decision Note   Keena Banks  is requesting a refill authorization.  Brief Assessment and Rationale for Refill:  Approve     Medication Therapy Plan:         Pharmacist review requested: Yes   Extended chart review required: Yes   Comments:     Note composed:3:48 PM 07/31/2025

## 2025-07-31 NOTE — TELEPHONE ENCOUNTER
No care due was identified.  [facility] Embedded Care Due Messages. Reference number: [id number].   7/31/2025 2:21:57 PM CDT

## 2025-07-31 NOTE — TELEPHONE ENCOUNTER
Copied from CRM #[id number]. Topic: Medications - Medication Refill  >> Jul 31, 2025 12:15 PM Yasmeen wrote:  Type:  RX Refill Request    Who Called: pt  Refill or New Rx:erfill  RX Name and Strength:2 medicines  losartan (COZAAR) 100 MG tablet  montelukast (SINGULAIR) 10 mg tablet  How is the patient currently taking it? (ex. 1XDay):as directed   Is this a 30 day or 90 day RX:90  Preferred Pharmacy with phone number:  [facility]/pharmacy #[id number] - [place] - [address]  [place] [address]  Phone: [phone] Fax: [phone]  Local or Mail Order:local  Ordering Provider:frandy  Would the patient rather a call back or a response via [facility]? Call back  Best Call Back Number:[phone]   Additional Information: please refill and advise

## 2025-09-03 DIAGNOSIS — E78.5 HYPERLIPIDEMIA, UNSPECIFIED HYPERLIPIDEMIA TYPE: ICD-10-CM

## 2025-09-04 DIAGNOSIS — E78.5 HYPERLIPIDEMIA, UNSPECIFIED HYPERLIPIDEMIA TYPE: ICD-10-CM

## 2025-09-04 RX ORDER — ROSUVASTATIN CALCIUM 20 MG/1
20 TABLET, COATED ORAL DAILY
Qty: 90 TABLET | Refills: 3 | Status: SHIPPED | OUTPATIENT
Start: 2025-09-04

## 2025-09-04 RX ORDER — ROSUVASTATIN CALCIUM 20 MG/1
20 TABLET, COATED ORAL DAILY
Qty: 90 TABLET | Refills: 3 | OUTPATIENT
Start: 2025-09-04

## (undated) DEVICE — BANDAGE MATRIX HK LOOP 2IN 5YD

## (undated) DEVICE — DRESSING XEROFORM NONADH 1X8IN

## (undated) DEVICE — DRAPE THREE-QTR REINF 53X77IN

## (undated) DEVICE — DRAPE HAND STERILE

## (undated) DEVICE — HANDLE SURG LIGHT NONRIGID

## (undated) DEVICE — FORCEP STRAIGHT DISP

## (undated) DEVICE — CORD BIPOLAR 12 FOOT

## (undated) DEVICE — BANDAGE ELAS SOFTWRAP ST 3X5YD

## (undated) DEVICE — IMPLANTABLE DEVICE
Type: IMPLANTABLE DEVICE | Site: FINGER | Status: NON-FUNCTIONAL
Removed: 2024-08-27

## (undated) DEVICE — SPLINT PLASTER FAST SET 5X30IN

## (undated) DEVICE — DRAPE U SPLIT SHEET 54X76IN

## (undated) DEVICE — PAD CAST SPECIALIST STRL 3

## (undated) DEVICE — TOURNIQUET SB QC DP 18X4IN

## (undated) DEVICE — Device

## (undated) DEVICE — BLADE SURG #15 CARBON STEEL

## (undated) DEVICE — SUT 4-0 ETHILON 18 PS-2

## (undated) DEVICE — GLOVE PROTEXIS LTX MICRO  7.5

## (undated) DEVICE — SEE L#120831

## (undated) DEVICE — APPLICATOR CHLORAPREP ORN 26ML

## (undated) DEVICE — DRAPE C ARM 42 X 120 10/BX

## (undated) DEVICE — NDL HYPO 27G X 1 1/2

## (undated) DEVICE — BLADE SAGITTAL FINE 5.5 X 18.5

## (undated) DEVICE — BOWL STERILE LARGE 32OZ

## (undated) DEVICE — PAD CAST 2 IN X 4YDS STERILE

## (undated) DEVICE — STRAP OR TABLE 5IN X 72IN

## (undated) DEVICE — BANDAGE MATRIX HK LOOP 4IN 5YD

## (undated) DEVICE — DRAPE STERI-DRAPE 1000 17X11IN

## (undated) DEVICE — KIT SAHARA DRAPE DRAW/LIFT

## (undated) DEVICE — SPONGE COTTON TRAY 4X4IN

## (undated) DEVICE — ALCOHOL 70% ISOP RUBBING 4OZ

## (undated) DEVICE — GLOVE PROTEXIS LTX MICRO 8

## (undated) DEVICE — GLOVE SENSICARE PI ALOE 8